# Patient Record
Sex: MALE | Race: WHITE | NOT HISPANIC OR LATINO | Employment: OTHER | URBAN - METROPOLITAN AREA
[De-identification: names, ages, dates, MRNs, and addresses within clinical notes are randomized per-mention and may not be internally consistent; named-entity substitution may affect disease eponyms.]

---

## 2017-01-23 ENCOUNTER — OFFICE VISIT (OUTPATIENT)
Dept: FAMILY MEDICINE | Facility: CLINIC | Age: 72
End: 2017-01-23
Payer: MEDICARE

## 2017-01-23 VITALS
DIASTOLIC BLOOD PRESSURE: 73 MMHG | HEIGHT: 67 IN | SYSTOLIC BLOOD PRESSURE: 137 MMHG | WEIGHT: 211 LBS | BODY MASS INDEX: 33.12 KG/M2 | HEART RATE: 78 BPM | TEMPERATURE: 97.7 F

## 2017-01-23 DIAGNOSIS — M25.512 LEFT SHOULDER PAIN, UNSPECIFIED CHRONICITY: Primary | ICD-10-CM

## 2017-01-23 PROCEDURE — 99213 OFFICE O/P EST LOW 20 MIN: CPT | Performed by: FAMILY MEDICINE

## 2017-01-23 RX ORDER — ALPRAZOLAM 0.5 MG
TABLET ORAL
Qty: 2 TABLET | Refills: 0 | Status: SHIPPED | OUTPATIENT
Start: 2017-01-23 | End: 2017-10-03

## 2017-01-23 NOTE — MR AVS SNAPSHOT
After Visit Summary   1/23/2017    Rian Ness    MRN: [id number]           Patient Information     Date Of Birth          1945        Visit Information        Provider Department      1/23/2017 9:20 AM Junie Golden MD [facility]        Today's Diagnoses     Left shoulder pain, unspecified chronicity    -  1       Care Instructions        Please  call [phone] to schedule your MRI.          Follow-ups after your visit        Future tests that were ordered for you today     Open Future Orders        Priority Expected Expires Ordered    MR Shoulder Left w/o Contrast Routine  1/23/2018 1/23/2017            Who to contact     If you have questions or need follow up information about today's clinic visit or your schedule please contact [facility] directly at [phone].  Normal or non-critical lab and imaging results will be communicated to you by MyChart, letter or phone within 4 business days after the clinic has received the results. If you do not hear from us within 7 days, please contact the clinic through MyChart or phone. If you have a critical or abnormal lab result, we will notify you by phone as soon as possible.  Submit refill requests through [employer] or call your pharmacy and they will forward the refill request to us. Please allow 3 business days for your refill to be completed.          Additional Information About Your Visit        MyChart Information     [employer] gives you secure access to your electronic health record. If you see a primary care provider, you can also send messages to your care team and make appointments. If you have questions, please call your primary care clinic.  If you do not have a primary care provider, please call [phone] and they will assist you.        Care EveryWhere ID     This is your Care EveryWhere ID. This could be used by other organizations to access your [place] medical records  [id number]       "  Your Vitals Were     Pulse Temperature Height BMI (Body Mass Index)          78 97.7  F (36.5  C) (Oral) 5' 7\" (1.702 m) 33.04 kg/m2         Blood Pressure from Last 3 Encounters:   01/23/17 137/73   11/16/16 110/70   11/14/16 135/87    Weight from Last 3 Encounters:   01/23/17 211 lb (95.709 kg)   11/14/16 211 lb (95.709 kg)   08/17/16 207 lb (93.895 kg)                 Today's Medication Changes          These changes are accurate as of: 1/23/17  9:43 AM.  If you have any questions, ask your nurse or doctor.               Start taking these medicines.        Dose/Directions    ALPRAZolam 0.5 MG tablet   Commonly known as:  XANAX   Used for:  Left shoulder pain, unspecified chronicity   Started by:  Junie Golden MD        Take 1 tab 30 min prior procedure.  May repeat dose if still with symptoms prior to test   Quantity:  2 tablet   Refills:  0            Where to get your medicines      Some of these will need a paper prescription and others can be bought over the counter.  Ask your nurse if you have questions.     Bring a paper prescription for each of these medications    - ALPRAZolam 0.5 MG tablet             Primary Care Provider Office Phone # Fax #    Junie Golden [phone]       [facility] [address] [facility] [address]        Thank you!     Thank you for choosing [facility]  for your care. Our goal is always to provide you with excellent care. Hearing back from our patients is one way we can continue to improve our services. Please take a few minutes to complete the written survey that you may receive in the mail after your visit with us. Thank you!             Your Updated Medication List - Protect others around you: Learn how to safely use, store and throw away your medicines at www.disposemymeds.org.          This list is accurate as of: 1/23/17  9:43 AM.  Always use your most recent med list.                   Brand Name Dispense " Instructions for use    ALEVE 220 MG capsule   Generic drug:  naproxen sodium      Take 220 mg by mouth 2 times daily (with meals).       ALPRAZolam 0.5 MG tablet    XANAX    2 tablet    Take 1 tab 30 min prior procedure.  May repeat dose if still with symptoms prior to test       DAILY MULTIVITAMIN PO      1 tab daily.       loratadine 10 MG tablet    CLARITIN    90 tablet    Take 1 tablet (10 mg) by mouth daily       MAGNESIUM PO      1 tablet daily       metoprolol 50 MG 24 hr tablet    TOPROL-XL    90 tablet    Take 1 tablet (50 mg) by mouth daily       omeprazole 20 MG CR capsule    priLOSEC    180 capsule    Take 1 capsule (20 mg) by mouth 2 times daily

## 2017-01-23 NOTE — NURSING NOTE
"Chief Complaint   Patient presents with     Shoulder Pain       Initial /73 mmHg  Pulse 78  Temp(Src) 97.7  F (36.5  C) (Oral)  Ht 5' 7\" (1.702 m)  Wt 211 lb (95.709 kg)  BMI 33.04 kg/m2 Estimated body mass index is 33.04 kg/(m^2) as calculated from the following:    Height as of this encounter: 5' 7\" (1.702 m).    Weight as of this encounter: 211 lb (95.709 kg).  BP completed using cuff size: chioma Crump CMA      "

## 2017-01-23 NOTE — PROGRESS NOTES
SUBJECTIVE:                                                    Rian Ness is a 71 year old male who presents to clinic today for the following health issues:      Follow up left shoulder pain , pt has been doing PT they recommended he come back may need more imaging   Has not improved as expected.  Suspect Rotator Cuff injury.      Patient Active Problem List   Diagnosis     Testicular hypofunction     Osteoarthritis, knee     Malignant neoplasm of prostate (H)     CARDIOVASCULAR SCREENING; LDL GOAL LESS THAN 130     Advanced directives, counseling/discussion     Status post total knee replacement     Rotator cuff tendinitis     AC (acromioclavicular) joint arthritis     Biceps tendonitis     Pseudophakia,ou     Hypertension goal BP (blood pressure) < 140/90     Gastroesophageal reflux disease without esophagitis     Acute pain of left shoulder     Posterior vitreous detachment, bilateral     Past Surgical History   Procedure Laterality Date     Arthroscopy knee rt/lt  2000     right     Arthroscopy knee rt/lt  1998     left     Phacoemulsification with standard intraocular lens implant  1/2010; 2/2010     left eye; right eye     C total knee arthroplasty  10/11     right knee     Suprapubic prostatectomy  2/11     Colonoscopy       Vascular surgery         Social History   Substance Use Topics     Smoking status: Former Smoker -- 1.50 packs/day for 34 years     Types: Cigarettes     Quit date: 02/01/1984     Smokeless tobacco: Never Used      Comment: Nonsmoking household     Alcohol Use: Yes      Comment: occ     Family History   Problem Relation Age of Onset     CANCER Mother      leukemia     Depression Other      Depression Other      Prostate Cancer Brother      Prostate Cancer Brother          Current Outpatient Prescriptions   Medication Sig Dispense Refill     ALPRAZolam (XANAX) 0.5 MG tablet Take 1 tab 30 min prior procedure.  May repeat dose if still with symptoms prior to test 2 tablet 0      "metoprolol (TOPROL-XL) 50 MG 24 hr tablet Take 1 tablet (50 mg) by mouth daily 90 tablet 1     omeprazole (PRILOSEC) 20 MG capsule Take 1 capsule (20 mg) by mouth 2 times daily 180 capsule 1     loratadine (CLARITIN) 10 MG tablet Take 1 tablet (10 mg) by mouth daily 90 tablet 1     naproxen sodium (ALEVE) 220 MG capsule Take 220 mg by mouth 2 times daily (with meals).       DAILY MULTIVITAMIN PO 1 tab daily.        MAGNESIUM OR 1 tablet daily       BP Readings from Last 3 Encounters:   01/23/17 137/73   11/16/16 110/70   11/14/16 135/87    Wt Readings from Last 3 Encounters:   01/23/17 211 lb (95.709 kg)   11/14/16 211 lb (95.709 kg)   08/17/16 207 lb (93.895 kg)                  Labs reviewed in EPIC  Problem list, Medication list, Allergies, and Medical/Social/Surgical histories reviewed in [facility] and updated as appropriate.    ROS:  Constitutional, HEENT, cardiovascular, pulmonary, gi and gu systems are negative, except as otherwise noted.    OBJECTIVE:                                                    /73 mmHg  Pulse 78  Temp(Src) 97.7  F (36.5  C) (Oral)  Ht 5' 7\" (1.702 m)  Wt 211 lb (95.709 kg)  BMI 33.04 kg/m2  Body mass index is 33.04 kg/(m^2).  GENERAL: healthy, alert and no distress  MS: no gross musculoskeletal defects noted, no edema  Left shoulder: limited ROM all planes, remaining exam limited due to pain and lack of ROM.  SKIN: no suspicious lesions or rashes  PSYCH: mentation appears normal, affect normal/bright    Diagnostic Test Results:  none      ASSESSMENT/PLAN:                                                    (M25.512) Left shoulder pain, unspecified chronicity  (primary encounter diagnosis)  Comment: not improving  Plan: MR Shoulder Left w/o Contrast, ALPRAZolam         (XANAX) 0.5 MG tablet        MRI of shoulder ordered, xanax for claustrophobia       See Patient Instructions    Junie Golden MD  [facility]    "

## 2017-02-08 NOTE — PROGRESS NOTES
SUBJECTIVE:                                                    Rian Ness is a 71 year old male who presents to clinic today for the following health issues:      Edema bilateral ankles , red itching , possible cellulitis   Has had symptoms for many years.  Had vein stripping years ago.   Now with bilateral lower extremity edema, some redness, tightness/mild itch.    Using compression stockings daily.   No calf pain, but does report aching at night that can awaken him          Patient Active Problem List   Diagnosis     Testicular hypofunction     Osteoarthritis, knee     Malignant neoplasm of prostate (H)     CARDIOVASCULAR SCREENING; LDL GOAL LESS THAN 130     Advanced directives, counseling/discussion     Status post total knee replacement     Rotator cuff tendinitis     AC (acromioclavicular) joint arthritis     Biceps tendonitis     Pseudophakia,ou     Hypertension goal BP (blood pressure) < 140/90     Gastroesophageal reflux disease without esophagitis     Acute pain of left shoulder     Posterior vitreous detachment, bilateral     Past Surgical History   Procedure Laterality Date     Arthroscopy knee rt/lt  2000     right     Arthroscopy knee rt/lt  1998     left     Phacoemulsification with standard intraocular lens implant  1/2010; 2/2010     left eye; right eye     C total knee arthroplasty  10/11     right knee     Suprapubic prostatectomy  2/11     Colonoscopy       Vascular surgery       Abdomen surgery         Social History   Substance Use Topics     Smoking status: Former Smoker     Packs/day: 1.50     Years: 34.00     Types: Cigarettes     Quit date: 2/1/1984     Smokeless tobacco: Never Used      Comment: Nonsmoking household     Alcohol use Yes      Comment: occ     Family History   Problem Relation Age of Onset     CANCER Mother      leukemia     Depression Other      Depression Other      Prostate Cancer Brother      Prostate Cancer Brother          Current Outpatient Prescriptions  "  Medication Sig Dispense Refill     metoprolol (TOPROL-XL) 50 MG 24 hr tablet Take 1 tablet (50 mg) by mouth daily 90 tablet 1     omeprazole (PRILOSEC) 20 MG capsule Take 1 capsule (20 mg) by mouth 2 times daily 180 capsule 1     loratadine (CLARITIN) 10 MG tablet Take 1 tablet (10 mg) by mouth daily 90 tablet 1     naproxen sodium (ALEVE) 220 MG capsule Take 220 mg by mouth 2 times daily (with meals).       DAILY MULTIVITAMIN PO 1 tab daily.        MAGNESIUM OR 1 tablet daily       ALPRAZolam (XANAX) 0.5 MG tablet Take 1 tab 30 min prior procedure.  May repeat dose if still with symptoms prior to test (Patient not taking: Reported on 2/13/2017) 2 tablet 0     BP Readings from Last 3 Encounters:   02/13/17 135/72   01/23/17 137/73   11/16/16 110/70    Wt Readings from Last 3 Encounters:   02/13/17 208 lb (94.3 kg)   01/23/17 211 lb (95.7 kg)   11/14/16 211 lb (95.7 kg)                  Labs reviewed in EPIC  Problem list, Medication list, Allergies, and Medical/Social/Surgical histories reviewed in [facility] and updated as appropriate.    ROS:  Constitutional, HEENT, cardiovascular, pulmonary, gi and gu systems are negative, except as otherwise noted.    OBJECTIVE:                                                    /72 (Cuff Size: Adult Large)  Pulse 80  Temp 97.3  F (36.3  C) (Oral)  Ht 5' 7\" (1.702 m)  Wt 208 lb (94.3 kg)  BMI 32.58 kg/m2  Body mass index is 32.58 kg/(m^2).  GENERAL: healthy, alert and no distress  EYES: Eyes grossly normal to inspection, PERRL and conjunctivae and sclerae normal  MS: non-pitting edema to mid shin bilaterally, severe varicose veins on bilateral feet and moderate in bilateral lower legs.    SKIN: venous stasis changes, erythema of lower legs, brown staining as well  PSYCH: mentation appears normal, affect normal/bright    Diagnostic Test Results:  none      ASSESSMENT/PLAN:                                                    (I83.93) Varicose veins of legs  (primary encounter " diagnosis)  (I83.11,  I83.12) Venous stasis dermatitis of both lower extremities  Comment: changes likely due to venous disease  Plan: VASCULAR SURGERY REFERRAL        Refer to vascular surgery  Continue use of compression stockings    (I10) Hypertension goal BP (blood pressure) < 140/90  Comment: due for labs  Plan: Basic metabolic panel  (Ca, Cl, CO2, Creat,         Gluc, K, Na, BUN)              See Patient Instructions    Junie Golden MD  [facility]

## 2017-02-13 ENCOUNTER — OFFICE VISIT (OUTPATIENT)
Dept: FAMILY MEDICINE | Facility: CLINIC | Age: 72
End: 2017-02-13
Payer: MEDICARE

## 2017-02-13 ENCOUNTER — TELEPHONE (OUTPATIENT)
Dept: OTHER | Facility: CLINIC | Age: 72
End: 2017-02-13

## 2017-02-13 VITALS
WEIGHT: 208 LBS | TEMPERATURE: 97.3 F | HEIGHT: 67 IN | DIASTOLIC BLOOD PRESSURE: 72 MMHG | BODY MASS INDEX: 32.65 KG/M2 | HEART RATE: 80 BPM | SYSTOLIC BLOOD PRESSURE: 135 MMHG

## 2017-02-13 DIAGNOSIS — I83.93 VARICOSE VEINS OF LEGS: Primary | ICD-10-CM

## 2017-02-13 DIAGNOSIS — I87.2 VENOUS STASIS DERMATITIS OF BOTH LOWER EXTREMITIES: ICD-10-CM

## 2017-02-13 DIAGNOSIS — I10 HYPERTENSION GOAL BP (BLOOD PRESSURE) < 140/90: ICD-10-CM

## 2017-02-13 LAB
ANION GAP SERPL CALCULATED.3IONS-SCNC: 3 MMOL/L (ref 3–14)
BUN SERPL-MCNC: 17 MG/DL (ref 7–30)
CALCIUM SERPL-MCNC: 8.9 MG/DL (ref 8.5–10.1)
CHLORIDE SERPL-SCNC: 105 MMOL/L (ref 94–109)
CO2 SERPL-SCNC: 33 MMOL/L (ref 20–32)
CREAT SERPL-MCNC: 0.91 MG/DL (ref 0.66–1.25)
GFR SERPL CREATININE-BSD FRML MDRD: 82 ML/MIN/1.7M2
GLUCOSE SERPL-MCNC: 83 MG/DL (ref 70–99)
POTASSIUM SERPL-SCNC: 4.6 MMOL/L (ref 3.4–5.3)
SODIUM SERPL-SCNC: 141 MMOL/L (ref 133–144)

## 2017-02-13 PROCEDURE — 99213 OFFICE O/P EST LOW 20 MIN: CPT | Performed by: FAMILY MEDICINE

## 2017-02-13 PROCEDURE — 80048 BASIC METABOLIC PNL TOTAL CA: CPT | Performed by: FAMILY MEDICINE

## 2017-02-13 PROCEDURE — 36415 COLL VENOUS BLD VENIPUNCTURE: CPT | Performed by: FAMILY MEDICINE

## 2017-02-13 NOTE — TELEPHONE ENCOUNTER
Spoke with patient and he does not want to drive down to [place]. Thought appointment was in [place] which is closer to home. Will ask primary care DrAyde If there is someone closer.     Kina Contreras MA

## 2017-02-13 NOTE — TELEPHONE ENCOUNTER
Pt referred to [facility] by :Junie Golden MD for venous stasis, bilateral lower extremity edema, possible cellulitis.    Pt needs to be scheduled for consult with vascular medicine.  Will route to scheduling to coordinate an appointment next available.    Sabrina Louis RN BSN

## 2017-02-13 NOTE — PATIENT INSTRUCTIONS
Call to make appointment with vascular surgery      Wear compression stockings daily      Will consider water pill if needed.

## 2017-02-13 NOTE — NURSING NOTE
"Chief Complaint   Patient presents with     Edema       Initial /72 (Cuff Size: Adult Large)  Pulse 80  Temp 97.3  F (36.3  C) (Oral)  Ht 5' 7\" (1.702 m)  Wt 208 lb (94.3 kg)  BMI 32.58 kg/m2 Estimated body mass index is 32.58 kg/(m^2) as calculated from the following:    Height as of this encounter: 5' 7\" (1.702 m).    Weight as of this encounter: 208 lb (94.3 kg).  Medication Reconciliation: complete  Mandi Crump , SHERI     "

## 2017-02-13 NOTE — MR AVS SNAPSHOT
After Visit Summary   2/13/2017    Rian Ness    MRN: [id number]           Patient Information     Date Of Birth          1945        Visit Information        Provider Department      2/13/2017 8:20 AM Junie Golden MD [facility]        Today's Diagnoses     Varicose veins of legs    -  1    Venous stasis dermatitis of both lower extremities        Hypertension goal BP (blood pressure) < 140/90          Care Instructions    Call to make appointment with vascular surgery      Wear compression stockings daily      Will consider water pill if needed.        Follow-ups after your visit        Additional Services     VASCULAR SURGERY REFERRAL       Your provider has referred you to: [facility]: [facility] - Vascular  Services [phone] - Varicose Veins & None - Please Order Appropriate Testing   [email or web address]    Please be aware that coverage of these services is subject to the terms and limitations of your health insurance plan.  Call member services at your health plan with any benefit or coverage questions.      Please bring the following with you to your appointment:    (1) Any X-Rays, CTs or MRIs which have been performed.  Contact the facility where they were done to arrange for  prior to your scheduled appointment.    (2) List of current medications   (3) This referral request   (4) Any documents/labs given to you for this referral                  Who to contact     If you have questions or need follow up information about today's clinic visit or your schedule please contact [facility] directly at [phone].  Normal or non-critical lab and imaging results will be communicated to you by MyChart, letter or phone within 4 business days after the clinic has received the results. If you do not hear from us within 7 days, please contact the clinic through MyChart or phone. If you  "have a critical or abnormal lab result, we will notify you by phone as soon as possible.  Submit refill requests through [employer] or call your pharmacy and they will forward the refill request to us. Please allow 3 business days for your refill to be completed.          Additional Information About Your Visit        [employer] Information     [employer] gives you secure access to your electronic health record. If you see a primary care provider, you can also send messages to your care team and make appointments. If you have questions, please call your primary care clinic.  If you do not have a primary care provider, please call [phone] and they will assist you.        Care EveryWhere ID     This is your Care EveryWhere ID. This could be used by other organizations to access your [place] medical records  [id number]        Your Vitals Were     Pulse Temperature Height BMI (Body Mass Index)          80 97.3  F (36.3  C) (Oral) 5' 7\" (1.702 m) 32.58 kg/m2         Blood Pressure from Last 3 Encounters:   02/13/17 135/72   01/23/17 137/73   11/16/16 110/70    Weight from Last 3 Encounters:   02/13/17 208 lb (94.3 kg)   01/23/17 211 lb (95.7 kg)   11/14/16 211 lb (95.7 kg)              We Performed the Following     Basic metabolic panel  (Ca, Cl, CO2, Creat, Gluc, K, Na, BUN)     VASCULAR SURGERY REFERRAL        Primary Care Provider Office Phone # Fax #    Junie La Golden [phone]       [facility] [address] [facility] [address]        Thank you!     Thank you for choosing [facility]  for your care. Our goal is always to provide you with excellent care. Hearing back from our patients is one way we can continue to improve our services. Please take a few minutes to complete the written survey that you may receive in the mail after your visit with us. Thank you!             Your Updated Medication List - Protect others around you: Learn how to safely use, store and " throw away your medicines at www.disposemymeds.org.          This list is accurate as of: 2/13/17  9:05 AM.  Always use your most recent med list.                   Brand Name Dispense Instructions for use    ALEVE 220 MG capsule   Generic drug:  naproxen sodium      Take 220 mg by mouth 2 times daily (with meals).       ALPRAZolam 0.5 MG tablet    XANAX    2 tablet    Take 1 tab 30 min prior procedure.  May repeat dose if still with symptoms prior to test       DAILY MULTIVITAMIN PO      1 tab daily.       loratadine 10 MG tablet    CLARITIN    90 tablet    Take 1 tablet (10 mg) by mouth daily       MAGNESIUM PO      1 tablet daily       metoprolol 50 MG 24 hr tablet    TOPROL-XL    90 tablet    Take 1 tablet (50 mg) by mouth daily       omeprazole 20 MG CR capsule    priLOSEC    180 capsule    Take 1 capsule (20 mg) by mouth 2 times daily

## 2017-02-14 DIAGNOSIS — I10 ESSENTIAL HYPERTENSION WITH GOAL BLOOD PRESSURE LESS THAN 140/90: ICD-10-CM

## 2017-02-14 DIAGNOSIS — R10.13 EPIGASTRIC PAIN: ICD-10-CM

## 2017-02-15 RX ORDER — METOPROLOL SUCCINATE 50 MG/1
50 TABLET, EXTENDED RELEASE ORAL DAILY
Qty: 90 TABLET | Refills: 3 | Status: SHIPPED | OUTPATIENT
Start: 2017-02-15 | End: 2018-02-26

## 2017-03-10 ENCOUNTER — RADIANT APPOINTMENT (OUTPATIENT)
Dept: MRI IMAGING | Facility: CLINIC | Age: 72
End: 2017-03-10
Attending: FAMILY MEDICINE
Payer: MEDICARE

## 2017-03-10 PROCEDURE — 73221 MRI JOINT UPR EXTREM W/O DYE: CPT | Mod: TC

## 2017-03-20 ENCOUNTER — MYC MEDICAL ADVICE (OUTPATIENT)
Dept: FAMILY MEDICINE | Facility: CLINIC | Age: 72
End: 2017-03-20

## 2017-03-20 DIAGNOSIS — M12.812 LEFT ROTATOR CUFF TEAR ARTHROPATHY: Primary | ICD-10-CM

## 2017-03-20 DIAGNOSIS — M75.102 LEFT ROTATOR CUFF TEAR ARTHROPATHY: Primary | ICD-10-CM

## 2017-03-21 NOTE — TELEPHONE ENCOUNTER
Called and spoke to [facility] imaging schedulers, they called over to the MRI room in [place] and they apologized it looks like the reading radiologist never signed off on it which is why nothing is resulted in epic. They said if there is nothing in there by the end of the day we should give them a call back for them to talk to the providers to figure out what's going on.    Nayana Kearney,

## 2017-03-21 NOTE — TELEPHONE ENCOUNTER
The preliminary results for the MR shoulder done on 3/10/17 are now in chart.    Nayana Kearney,

## 2017-03-21 NOTE — TELEPHONE ENCOUNTER
TC, patient has MRI done 3/10/17 but there is no result in Epic.  Can you please have them finalize the result or have them fax to PCP?    Thanks,   Martha Prince RN

## 2017-03-27 ENCOUNTER — OFFICE VISIT (OUTPATIENT)
Dept: ORTHOPEDICS | Facility: CLINIC | Age: 72
End: 2017-03-27
Payer: MEDICARE

## 2017-03-27 VITALS — HEIGHT: 67 IN | WEIGHT: 203 LBS | RESPIRATION RATE: 18 BRPM | BODY MASS INDEX: 31.86 KG/M2

## 2017-03-27 DIAGNOSIS — M19.019 AC (ACROMIOCLAVICULAR) JOINT ARTHRITIS: ICD-10-CM

## 2017-03-27 DIAGNOSIS — Z98.890 S/P COMPLETE REPAIR OF ROTATOR CUFF: Primary | ICD-10-CM

## 2017-03-27 PROCEDURE — 99204 OFFICE O/P NEW MOD 45 MIN: CPT | Performed by: ORTHOPAEDIC SURGERY

## 2017-03-27 NOTE — NURSING NOTE
"Chief Complaint   Patient presents with     Consult     Left shoulder injury on 11/23/16 fell off trailer in his driveway. Pain level 0-5-6/10 sharp and episodic. Rest makes the pain better and work and exercise makes the pain worse.       Initial Resp 18  Ht 1.702 m (5' 7\")  Wt 92.1 kg (203 lb)  BMI 31.79 kg/m2 Estimated body mass index is 31.79 kg/(m^2) as calculated from the following:    Height as of this encounter: 1.702 m (5' 7\").    Weight as of this encounter: 92.1 kg (203 lb).  Medication Reconciliation: complete   Kamryn Shaw MA      "

## 2017-03-27 NOTE — MR AVS SNAPSHOT
After Visit Summary   3/27/2017    Rian Ness    MRN: [id number]           Patient Information     Date Of Birth          1945        Visit Information        Provider Department      3/27/2017 9:00 AM Flash Post MD [facility]        Care Instructions    Large rotator cuff tear has a 6 month window to fix it.  Most of our window is gone.  Probably about a 60% chance of healing with surgery.  Another option is to leave it alone, baby it, and try to maintain what you have.  Third option is reverse total shoulder arthroplasty in the future.    Rotator cuff repair is done in same day surgery.    Preop physical with primary physician is needed within 30 days of the surgery.  Nothing to eat or drink for 8 hours before surgery.  For same day surgery you need a ride.  Someone should stay with you for 12 hours afterward.  Stop blood thinners 5 days before surgery (aspirin, warfarin, anti-inflammatories).    Stop Plavix at least 10-14 days before surgery.  General anesthesia is used.  They often perform a pain block at the neck to help with pain.  Sutures are in the wound.  Keep wound dry until clinic appointment at 1 1/2 weeks.     Physical Therapy will start after first clinic visit.  0-3 weeks.  Arm in sling or immobilizer.  No reaching with operative arm.   Okay to have arm out to dangle or bend elbow.    3-6 weeks.  Discontinue immobilizer.  Start reaching.  No lifting over 1 lb.  6-12 weeks  Work on strengthening.  1 year to full recovery.    If you decide to have surgery, call us first at [phone].  Call [phone] to schedule your surgery.        Follow-ups after your visit        Who to contact     If you have questions or need follow up information about today's clinic visit or your schedule please contact [facility] directly at [phone].  Normal or non-critical lab and imaging results will be communicated to you by MyChart, letter or phone  "within 4 business days after the clinic has received the results. If you do not hear from us within 7 days, please contact the clinic through "[employer] or phone. If you have a critical or abnormal lab result, we will notify you by phone as soon as possible.  Submit refill requests through "[employer] or call your pharmacy and they will forward the refill request to us. Please allow 3 business days for your refill to be completed.          Additional Information About Your Visit        [employer] Information     "[employer] gives you secure access to your electronic health record. If you see a primary care provider, you can also send messages to your care team and make appointments. If you have questions, please call your primary care clinic.  If you do not have a primary care provider, please call [phone] and they will assist you.        Care EveryWhere ID     This is your Care EveryWhere ID. This could be used by other organizations to access your [facility] medical records  [id number]        Your Vitals Were     Respirations Height BMI (Body Mass Index)             18 1.702 m (5' 7\") 31.79 kg/m2          Blood Pressure from Last 3 Encounters:   02/13/17 135/72   01/23/17 137/73   11/16/16 110/70    Weight from Last 3 Encounters:   03/27/17 92.1 kg (203 lb)   02/13/17 94.3 kg (208 lb)   01/23/17 95.7 kg (211 lb)              Today, you had the following     No orders found for display       Primary Care Provider Office Phone # Fax #    Junie Golden [phone]       [facility] [address] [facility] [address]        Thank you!     Thank you for choosing [facility]  for your care. Our goal is always to provide you with excellent care. Hearing back from our patients is one way we can continue to improve our services. Please take a few minutes to complete the written survey that you may receive in the mail after your visit with us. Thank you!             Your Updated " Medication List - Protect others around you: Learn how to safely use, store and throw away your medicines at www.disposemymeds.org.          This list is accurate as of: 3/27/17  9:38 AM.  Always use your most recent med list.                   Brand Name Dispense Instructions for use    ALEVE 220 MG capsule   Generic drug:  naproxen sodium      Take 220 mg by mouth 2 times daily (with meals).       ALPRAZolam 0.5 MG tablet    XANAX    2 tablet    Take 1 tab 30 min prior procedure.  May repeat dose if still with symptoms prior to test       DAILY MULTIVITAMIN PO      1 tab daily.       loratadine 10 MG tablet    CLARITIN    90 tablet    Take 1 tablet (10 mg) by mouth daily       MAGNESIUM PO      1 tablet daily       metoprolol 50 MG 24 hr tablet    TOPROL-XL    90 tablet    Take 1 tablet (50 mg) by mouth daily       omeprazole 20 MG CR capsule    priLOSEC    180 capsule    Take 1 capsule (20 mg) by mouth 2 times daily

## 2017-03-27 NOTE — PATIENT INSTRUCTIONS
Large rotator cuff tear has a 6 month window to fix it.  Most of our window is gone.  Probably about a 60% chance of healing with surgery.  Another option is to leave it alone, baby it, and try to maintain what you have.  Third option is reverse total shoulder arthroplasty in the future.    Rotator cuff repair is done in same day surgery.    Preop physical with primary physician is needed within 30 days of the surgery.  Nothing to eat or drink for 8 hours before surgery.  For same day surgery you need a ride.  Someone should stay with you for 12 hours afterward.  Stop blood thinners 5 days before surgery (aspirin, warfarin, anti-inflammatories).    Stop Plavix at least 10-14 days before surgery.  General anesthesia is used.  They often perform a pain block at the neck to help with pain.  Sutures are in the wound.  Keep wound dry until clinic appointment at 1 1/2 weeks.     Physical Therapy will start after first clinic visit.  0-3 weeks.  Arm in sling or immobilizer.  No reaching with operative arm.   Okay to have arm out to dangle or bend elbow.    3-6 weeks.  Discontinue immobilizer.  Start reaching.  No lifting over 1 lb.  6-12 weeks  Work on strengthening.  1 year to full recovery.    If you decide to have surgery, call us first at [phone].  Call [phone] to schedule your surgery.

## 2017-03-27 NOTE — LETTER
3/27/2017       RE: Rian Ness  [address] [facility] [address]           Dear Colleague,    Thank you for referring your patient, Rian Ness, to the [facility]. Please see a copy of my visit note below.    Rian Ness is a 71 year old male who is seen in consultation at the request of Dr. Junie Golden  for left shoulder injury in November 23,2016    Past Medical History:   Diagnosis Date     Arthritis      Cancer (H)      Depressive disorder      HTN        Past Surgical History:   Procedure Laterality Date     ABDOMEN SURGERY       ARTHROSCOPY KNEE RT/LT  2000    right     ARTHROSCOPY KNEE RT/LT  1998    left     C TOTAL KNEE ARTHROPLASTY  10/11    right knee     COLONOSCOPY       PHACOEMULSIFICATION WITH STANDARD INTRAOCULAR LENS IMPLANT  1/2010; 2/2010    left eye; right eye     SUPRAPUBIC PROSTATECTOMY  2/11     VASCULAR SURGERY         Family History   Problem Relation Age of Onset     CANCER Mother      leukemia     Depression Other      Depression Other      Prostate Cancer Brother      Prostate Cancer Brother        Social History     Social History     Marital status:      Spouse name: N/A     Number of children: N/A     Years of education: N/A     Occupational History     Not on file.     Social History Main Topics     Smoking status: Former Smoker     Packs/day: 1.50     Years: 34.00     Types: Cigarettes     Quit date: 2/1/1984     Smokeless tobacco: Never Used      Comment: Nonsmoking household     Alcohol use Yes      Comment: occ     Drug use: No     Sexual activity: No     Other Topics Concern     Parent/Sibling W/ Cabg, Mi Or Angioplasty Before 65f 55m? No     Social History Narrative       Current Outpatient Prescriptions   Medication Sig Dispense Refill     metoprolol (TOPROL-XL) 50 MG 24 hr tablet Take 1 tablet (50 mg) by mouth daily 90 tablet 3     omeprazole (PRILOSEC) 20 MG CR capsule Take 1 capsule (20 mg) by mouth 2 times daily 180  "capsule 3     ALPRAZolam (XANAX) 0.5 MG tablet Take 1 tab 30 min prior procedure.  May repeat dose if still with symptoms prior to test (Patient not taking: Reported on 2/13/2017) 2 tablet 0     loratadine (CLARITIN) 10 MG tablet Take 1 tablet (10 mg) by mouth daily 90 tablet 1     naproxen sodium (ALEVE) 220 MG capsule Take 220 mg by mouth 2 times daily (with meals).       DAILY MULTIVITAMIN PO 1 tab daily.        MAGNESIUM OR 1 tablet daily         No Known Allergies    REVIEW OF SYSTEMS:  CONSTITUTIONAL:  NEGATIVE for fever, chills, change in weight, not feeling tired  SKIN:  NEGATIVE for worrisome rashes, no skin lumps, no skin ulcers and no non-healing wounds  EYES:  NEGATIVE for vision changes or irritation.  ENT/MOUTH:  NEGATIVE.  No hearing loss, no hoarseness, no difficulty swallowing.  RESP:  NEGATIVE. No cough or shortness of breath.  BREAST:  NEGATIVE for masses, tenderness or discharge  CV:  NEGATIVE for chest pain, palpitations or peripheral edema  GI:  NEGATIVE for nausea, abdominal pain, heartburn, or change in bowel habits  :  Negative. No dysuria, no hematuria  MUSCULOSKELETAL:  See HPI above  NEURO:  NEGATIVE . No headaches, no dizziness,  no numbness  ENDOCRINE:  NEGATIVE for temperature intolerance, skin/hair changes  HEME/ALLERGY/IMMUNE:  NEGATIVE for bleeding problems  PSYCHIATRIC:  NEGATIVE. no anxiety, no depression.     Exam:  Vitals: Resp 18  Ht 1.702 m (5' 7\")  Wt 92.1 kg (203 lb)  BMI 31.79 kg/m2  BMI= Body mass index is 31.79 kg/(m^2).  Constitutional:  healthy, alert and no distress  Neuro: Alert and Oriented x 3, Gait normal. Sensation grossly WNL.  Psych: Affect normal   Respiratory: Breathing not labored.  Cardiovascular: normal peripheral pulses  Lymph: no adenopathy  Skin: No rashes,worrisome lesions or skin problems      Again, thank you for allowing me to participate in the care of your patient.        Sincerely,              Flash Post MD    "

## 2017-03-29 PROBLEM — Z98.890 S/P COMPLETE REPAIR OF ROTATOR CUFF: Status: ACTIVE | Noted: 2017-03-29

## 2017-03-30 NOTE — PROGRESS NOTES
DATE OF VISIT:  03/27/2017.      HISTORY OF PRESENT ILLNESS:  Mr. Rian Ness is a 71-year-old male referred from Dr. Junie Golden for evaluation of left shoulder pain.  He has had some prior problems with the shoulder but was quite tolerable.  Then on 11/23/2016, he fell off a trailer in his driveway and has had increased pain and weakness in the left shoulder since then.  He has difficulty raising the arm with anything in the hand.  He has no pain at rest but has sharp episodic pain rated 5/10 to 6/10 with use.  He has tried physical therapy and has not made progress.  Work and exercise seem to make it worse, rest seems to help.  He has had x-ray and MRI.  The x-ray of the shoulder showed mild degenerative changes of the AC joint.  There was no significant narrowing of the coracoacromial arch and no arthritis in the shoulder glenohumeral joint.  MRI scan was performed showing a full thickness tear of the rotator cuff measuring approximately 2 x 2 cm with retraction back to the bony glenoid margin.  There was minimal fatty atrophy of the supraspinatus muscle.  Infraspinatus also showed some degenerative tendinopathy.  There was no definite tear of this.  There was marked thinning of the subscapularis tendon consistent with a near full thickness tear.  The biceps tendon was markedly thin and irregular with some fragmentation consistent with at least a partial tear.      PHYSICAL EXAMINATION:  Shows full motion of his neck without pain.  He has full mobility of the shoulder, but has definite weakness of resisted external rotation and abduction on the left compared to the right.  He has pain with resisted external rotation and abduction.  Presently is able to raise the arm on his own, but not with any significant weight to the left arm.  He has no increased warmth or erythema of the shoulder.  No skin lesions.  Sensation and circulation are intact.      IMPRESSION:  Moderately large left rotator cuff tear and  acromioclavicular arthrosis along with biceps tendinosis and partial tearing.  This is a little over 4 months old at this time.  I have explained to him that with a large rotator cuff tear we have an approximately 6-month window for repair of this and that the success rate goes down each month that is delayed.  Thus, we could attempt to fix the rotator cuff at this time.  It appears there is not significant atrophy of the muscle, but he would have a long recovery.  The overall success rate on fixing this is probably close to 70%.  If he decides not to fix this, we would treat conservatively to control pain, possibly anti-inflammatories, steroid injection or just taking it very easy on the shoulder and if things got worse in the future, we could consider reverse total shoulder arthroplasty.  He is not sure about how he wishes to approach this and is going to discuss it further at home.  I have given him instructions for surgery in case he wishes to proceed.         DOROTHY ESPINO MD             D: 2017 20:15   T: 2017 21:36   MT: JAJA      Name:     SANDIP BENJAMIN   MRN:      [id number]        Account:      [id number]   :      1945           Visit Date:   2017      Document: [id number]

## 2017-03-30 NOTE — PROGRESS NOTES
Rian Ness is a 71 year old male who is seen in consultation at the request of Dr. Junie Golden  for left shoulder injury in November 23,2016    Past Medical History:   Diagnosis Date     Arthritis      Cancer (H)      Depressive disorder      HTN        Past Surgical History:   Procedure Laterality Date     ABDOMEN SURGERY       ARTHROSCOPY KNEE RT/LT  2000    right     ARTHROSCOPY KNEE RT/LT  1998    left     C TOTAL KNEE ARTHROPLASTY  10/11    right knee     COLONOSCOPY       PHACOEMULSIFICATION WITH STANDARD INTRAOCULAR LENS IMPLANT  1/2010; 2/2010    left eye; right eye     SUPRAPUBIC PROSTATECTOMY  2/11     VASCULAR SURGERY         Family History   Problem Relation Age of Onset     CANCER Mother      leukemia     Depression Other      Depression Other      Prostate Cancer Brother      Prostate Cancer Brother        Social History     Social History     Marital status:      Spouse name: N/A     Number of children: N/A     Years of education: N/A     Occupational History     Not on file.     Social History Main Topics     Smoking status: Former Smoker     Packs/day: 1.50     Years: 34.00     Types: Cigarettes     Quit date: 2/1/1984     Smokeless tobacco: Never Used      Comment: Nonsmoking household     Alcohol use Yes      Comment: occ     Drug use: No     Sexual activity: No     Other Topics Concern     Parent/Sibling W/ Cabg, Mi Or Angioplasty Before 65f 55m? No     Social History Narrative       Current Outpatient Prescriptions   Medication Sig Dispense Refill     metoprolol (TOPROL-XL) 50 MG 24 hr tablet Take 1 tablet (50 mg) by mouth daily 90 tablet 3     omeprazole (PRILOSEC) 20 MG CR capsule Take 1 capsule (20 mg) by mouth 2 times daily 180 capsule 3     ALPRAZolam (XANAX) 0.5 MG tablet Take 1 tab 30 min prior procedure.  May repeat dose if still with symptoms prior to test (Patient not taking: Reported on 2/13/2017) 2 tablet 0     loratadine (CLARITIN) 10 MG tablet Take 1 tablet (10 mg)  "by mouth daily 90 tablet 1     naproxen sodium (ALEVE) 220 MG capsule Take 220 mg by mouth 2 times daily (with meals).       DAILY MULTIVITAMIN PO 1 tab daily.        MAGNESIUM OR 1 tablet daily         No Known Allergies    REVIEW OF SYSTEMS:  CONSTITUTIONAL:  NEGATIVE for fever, chills, change in weight, not feeling tired  SKIN:  NEGATIVE for worrisome rashes, no skin lumps, no skin ulcers and no non-healing wounds  EYES:  NEGATIVE for vision changes or irritation.  ENT/MOUTH:  NEGATIVE.  No hearing loss, no hoarseness, no difficulty swallowing.  RESP:  NEGATIVE. No cough or shortness of breath.  BREAST:  NEGATIVE for masses, tenderness or discharge  CV:  NEGATIVE for chest pain, palpitations or peripheral edema  GI:  NEGATIVE for nausea, abdominal pain, heartburn, or change in bowel habits  :  Negative. No dysuria, no hematuria  MUSCULOSKELETAL:  See HPI above  NEURO:  NEGATIVE . No headaches, no dizziness,  no numbness  ENDOCRINE:  NEGATIVE for temperature intolerance, skin/hair changes  HEME/ALLERGY/IMMUNE:  NEGATIVE for bleeding problems  PSYCHIATRIC:  NEGATIVE. no anxiety, no depression.     Exam:  Vitals: Resp 18  Ht 1.702 m (5' 7\")  Wt 92.1 kg (203 lb)  BMI 31.79 kg/m2  BMI= Body mass index is 31.79 kg/(m^2).  Constitutional:  healthy, alert and no distress  Neuro: Alert and Oriented x 3, Gait normal. Sensation grossly WNL.  Psych: Affect normal   Respiratory: Breathing not labored.  Cardiovascular: normal peripheral pulses  Lymph: no adenopathy  Skin: No rashes,worrisome lesions or skin problems    "

## 2017-10-02 NOTE — PROGRESS NOTES
SUBJECTIVE:   Rian Ness is a 71 year old male who presents for Preventive Visit.    Are you in the first 12 months of your Medicare Part B coverage?  No    [place] [facility] [facility] for HPI/ROS submitted by the patient on 9/30/2017     Annual Exam:  Getting at least 3 servings of Calcium per day:: Yes  Bi-annual eye exam:: Yes  Dental care twice a year:: Yes  Sleep apnea or symptoms of sleep apnea:: None  Diet:: Regular (no restrictions)  Frequency of exercise:: 1 day/week  Taking medications regularly:: Yes  Medication side effects:: None  Additional concerns today:: YES  PHQ-2 Score: 0  Duration of exercise:: 15-30 minutes    COGNITIVE SCREEN  1) Repeat 3 items (Banana, Sunrise, Chair)    2) Clock draw: NORMAL  3) 3 item recall: Recalls 2 objects   Results: NORMAL clock, 1-2 items recalled: COGNITIVE IMPAIRMENT LESS LIKELY    Mini-CogTM Copyright REYNALDO Ruiz. Licensed by the author for use in [facility]; reprinted with permission (phuong@.[facility]). All rights reserved.                  Reviewed and updated as needed this visit by clinical staff  Tobacco  Allergies  Meds  Problems  Med Hx  Surg Hx  Fam Hx  Soc Hx          Reviewed and updated as needed this visit by Provider  Allergies  Meds  Problems        Social History   Substance Use Topics     Smoking status: Former Smoker     Packs/day: 1.50     Years: 34.00     Types: Cigarettes     Quit date: 2/1/1984     Smokeless tobacco: Never Used      Comment: Nonsmoking household     Alcohol use Yes      Comment: occ       Social alcohol use    Today's PHQ-2 Score:   PHQ-2 ( 1999 Pfizer) 9/30/2017 8/14/2016   Q1: Little interest or pleasure in doing things 0 -   Q2: Feeling down, depressed or hopeless 0 -   PHQ-2 Score 0 -   Q1: Little interest or pleasure in doing things Not at all Not at all   Q2: Feeling down, depressed or hopeless Not at all Not at all   PHQ-2 Score 0 0         Do you feel safe in your environment - No    Do you  have a Health Care Directive?: No: Advance care planning was reviewed with patient; patient declined at this time.    Family history of prostate cancer: Yes brothers x 2  Last PSA:   PSA   Date Value Ref Range Status   11/09/2016 0.03 0 - 4 ug/L Final     Comment:     Assay Method:  Chemiluminescence using Siemens Vista analyzer     Doing self testicular exam: NO     Family history of colon cancer:No  Last colonscopy: 2009     Family history of CAD:No  Last Cholesterol:   Lab Results   Component Value Date    CHOL 174 11/26/2012     Lab Results   Component Value Date    HDL 35 11/26/2012     Lab Results   Component Value Date     11/26/2012     Lab Results   Component Value Date    TRIG 182 11/26/2012     Lab Results   Component Value Date    CHOLHDLRATIO 4.9 11/26/2012          Immunizations:    Date last DT tdap 2012,  Date last Pneumovax completed,   Date last Flu due       Seat Belt:YES   Sunscreen use: YES  Calcium Intake: adeq  Health Care Directive:NO   Sexually Active:YES      Current contraception: none     Current Concerns: none          Patient Ed:  Reviewed health maintenance including diet, regular exercise, and periodic exams.     The risks, benefits, and treatment options of prescribed medications or other treatments have been discussed with the patient.  The patient should call or schedule a follow up appt if no improvement or other problems.   Current providers sharing in care for this patient include:   Patient Care Team:  Junie Golden MD as PCP - General (Family Practice)      Hearing impairment: Yes, will plan to get eval for hearing aid    Ability to successfully perform activities of daily living: Yes, no assistance needed     Fall risk:  Fallen 2 or more times in the past year?: No  Any fall with injury in the past year?: No      Home safety:  none identified      The following health maintenance items are reviewed in Epic and correct as of today:Health Maintenance   Topic Date  Due     MEDICARE ANNUAL WELLNESS VISIT  08/17/2017     FALL RISK ASSESSMENT  08/17/2017     PSA Q1 YR  11/09/2017     LIPID SCREEN Q5 YR MALE (SYSTEM ASSIGNED)  11/26/2017     EYE EXAM Q1 YEAR  12/09/2017     BMP Q1 YR  02/13/2018     COLON CANCER SCREEN (SYSTEM ASSIGNED)  04/15/2019     ADVANCE DIRECTIVE PLANNING Q5 YRS  11/14/2021     TETANUS IMMUNIZATION (SYSTEM ASSIGNED)  12/03/2022     INFLUENZA VACCINE (SYSTEM ASSIGNED)  Completed     PNEUMOCOCCAL  Completed     AORTIC ANEURYSM SCREENING (SYSTEM ASSIGNED)  Completed     Labs reviewed in EPIC  BP Readings from Last 3 Encounters:   10/03/17 117/70   02/13/17 135/72   01/23/17 137/73    Wt Readings from Last 3 Encounters:   10/03/17 208 lb (94.3 kg)   03/27/17 203 lb (92.1 kg)   02/13/17 208 lb (94.3 kg)                  Patient Active Problem List   Diagnosis     Testicular hypofunction     Osteoarthritis, knee     Malignant neoplasm of prostate (H)     CARDIOVASCULAR SCREENING; LDL GOAL LESS THAN 130     Advanced directives, counseling/discussion     Status post total knee replacement     AC (acromioclavicular) joint arthritis     Biceps tendonitis     Pseudophakia,ou     Hypertension goal BP (blood pressure) < 140/90     Gastroesophageal reflux disease without esophagitis     Posterior vitreous detachment, bilateral     S/P complete repair of rotator cuff     Past Surgical History:   Procedure Laterality Date     ABDOMEN SURGERY       ARTHROSCOPY KNEE RT/LT  2000    right     ARTHROSCOPY KNEE RT/LT  1998    left     C TOTAL KNEE ARTHROPLASTY  10/11    right knee     COLONOSCOPY       PHACOEMULSIFICATION WITH STANDARD INTRAOCULAR LENS IMPLANT  1/2010; 2/2010    left eye; right eye     SUPRAPUBIC PROSTATECTOMY  2/11     VASCULAR SURGERY         Social History   Substance Use Topics     Smoking status: Former Smoker     Packs/day: 1.50     Years: 34.00     Types: Cigarettes     Quit date: 2/1/1984     Smokeless tobacco: Never Used      Comment: Nonsmoking  "household     Alcohol use Yes      Comment: occ     Family History   Problem Relation Age of Onset     CANCER Mother      leukemia     Depression Other      Depression Other      Prostate Cancer Brother      Prostate Cancer Brother          Current Outpatient Prescriptions   Medication Sig Dispense Refill     loratadine (CLARITIN) 10 MG tablet Take 1 tablet (10 mg) by mouth daily 90 tablet 3     metoprolol (TOPROL-XL) 50 MG 24 hr tablet Take 1 tablet (50 mg) by mouth daily 90 tablet 3     omeprazole (PRILOSEC) 20 MG CR capsule Take 1 capsule (20 mg) by mouth 2 times daily 180 capsule 3     naproxen sodium (ALEVE) 220 MG capsule Take 220 mg by mouth 2 times daily (with meals).       DAILY MULTIVITAMIN PO 1 tab daily.        MAGNESIUM OR 1 tablet daily       [DISCONTINUED] loratadine (CLARITIN) 10 MG tablet Take 1 tablet (10 mg) by mouth daily 90 tablet 1           Pneumonia Vaccine:completed    ROS:  Constitutional, HEENT, cardiovascular, pulmonary, gi and gu systems are negative, except as otherwise noted.      OBJECTIVE:   /70  Pulse 74  Temp 97.2  F (36.2  C) (Oral)  Ht 5' 7\" (1.702 m)  Wt 208 lb (94.3 kg)  SpO2 98%  BMI 32.58 kg/m2 Estimated body mass index is 32.58 kg/(m^2) as calculated from the following:    Height as of this encounter: 5' 7\" (1.702 m).    Weight as of this encounter: 208 lb (94.3 kg).  EXAM:   GENERAL: healthy, alert and no distress  EYES: Eyes grossly normal to inspection, PERRL and conjunctivae and sclerae normal  HENT: ear canals and TM's normal, nose and mouth without ulcers or lesions  NECK: no adenopathy, no asymmetry, masses, or scars and thyroid normal to palpation  RESP: lungs clear to auscultation - no rales, rhonchi or wheezes  CV: regular rate and rhythm, normal S1 S2, no S3 or S4, no murmur, click or rub, no peripheral edema and peripheral pulses strong  ABDOMEN: soft, nontender, no hepatosplenomegaly, no masses and bowel sounds normal  MS: no gross musculoskeletal " "defects noted, no edema  SKIN: no suspicious lesions or rashes  NEURO: Normal strength and tone, mentation intact and speech normal  PSYCH: mentation appears normal, affect normal/bright    ASSESSMENT / PLAN:   (Z00.00) Medicare annual wellness visit, subsequent  (primary encounter diagnosis)  Comment: preventive needs reviewed  Plan: **Lipid panel reflex to direct LDL FUTURE         anytime        see orders in Epic.     (C61) Malignant neoplasm of prostate (H)  (Z12.5) Encounter for screening for malignant neoplasm of prostate  Comment: no symptoms of recurrence  Plan: PSA, screen            (J30.2) Seasonal allergic rhinitis, unspecified chronicity, unspecified trigger  Comment: stable  Plan: loratadine (CLARITIN) 10 MG tablet        Refill x 1 yr     (Z23) Need for prophylactic vaccination and inoculation against influenza  Comment: due  Plan: FLU VACCINE, INCREASED ANTIGEN, PRESV FREE, AGE        65+ [41005], Vaccine Administration, Initial         [39691]              End of Life Planning:  Patient currently has an advanced directive: No.  I have verified the patient's ablity to prepare an advanced directive/make health care decisions.  Literature was provided to assist patient in preparing an advanced directive.    COUNSELING:  Reviewed preventive health counseling, as reflected in patient instructions  Special attention given to:       Regular exercise       Healthy diet/nutrition        Estimated body mass index is 32.58 kg/(m^2) as calculated from the following:    Height as of this encounter: 5' 7\" (1.702 m).    Weight as of this encounter: 208 lb (94.3 kg).  Weight management plan: Discussed healthy diet and exercise guidelines and patient will follow up in 12 months in clinic to re-evaluate.   reports that he quit smoking about 33 years ago. His smoking use included Cigarettes. He has a 51.00 pack-year smoking history. He has never used smokeless tobacco.        Appropriate preventive services were " discussed with this patient, including applicable screening as appropriate for cardiovascular disease, diabetes, osteopenia/osteoporosis, and glaucoma.  As appropriate for age/gender, discussed screening for colorectal cancer, prostate cancer, breast cancer, and cervical cancer. Checklist reviewing preventive services available has been given to the patient.    Reviewed patients plan of care and provided an AVS. The Basic Care Plan (routine screening as documented in Health Maintenance) for Rian meets the Care Plan requirement. This Care Plan has been established and reviewed with the Patient.    Counseling Resources:  ATP IV Guidelines  Pooled Cohorts Equation Calculator  Breast Cancer Risk Calculator  FRAX Risk Assessment  ICSI Preventive Guidelines  Dietary Guidelines for Americans, 2010  USDA's MyPlate  ASA Prophylaxis  Lung CA Screening    Junie Golden MD

## 2017-10-03 ENCOUNTER — OFFICE VISIT (OUTPATIENT)
Dept: FAMILY MEDICINE | Facility: CLINIC | Age: 72
End: 2017-10-03
Payer: MEDICARE

## 2017-10-03 VITALS
SYSTOLIC BLOOD PRESSURE: 117 MMHG | TEMPERATURE: 97.2 F | DIASTOLIC BLOOD PRESSURE: 70 MMHG | HEART RATE: 74 BPM | OXYGEN SATURATION: 98 % | HEIGHT: 67 IN | BODY MASS INDEX: 32.65 KG/M2 | WEIGHT: 208 LBS

## 2017-10-03 DIAGNOSIS — Z12.5 ENCOUNTER FOR SCREENING FOR MALIGNANT NEOPLASM OF PROSTATE: ICD-10-CM

## 2017-10-03 DIAGNOSIS — C61 MALIGNANT NEOPLASM OF PROSTATE (H): ICD-10-CM

## 2017-10-03 DIAGNOSIS — J30.2 SEASONAL ALLERGIC RHINITIS, UNSPECIFIED CHRONICITY, UNSPECIFIED TRIGGER: ICD-10-CM

## 2017-10-03 DIAGNOSIS — Z23 NEED FOR PROPHYLACTIC VACCINATION AND INOCULATION AGAINST INFLUENZA: ICD-10-CM

## 2017-10-03 DIAGNOSIS — Z00.00 MEDICARE ANNUAL WELLNESS VISIT, SUBSEQUENT: Primary | ICD-10-CM

## 2017-10-03 PROCEDURE — 90662 IIV NO PRSV INCREASED AG IM: CPT | Performed by: FAMILY MEDICINE

## 2017-10-03 PROCEDURE — G0439 PPPS, SUBSEQ VISIT: HCPCS | Performed by: FAMILY MEDICINE

## 2017-10-03 PROCEDURE — G0008 ADMIN INFLUENZA VIRUS VAC: HCPCS | Performed by: FAMILY MEDICINE

## 2017-10-03 RX ORDER — LORATADINE 10 MG/1
10 TABLET ORAL DAILY
Qty: 90 TABLET | Refills: 3 | Status: SHIPPED | OUTPATIENT
Start: 2017-10-03 | End: 2018-04-16

## 2017-10-03 NOTE — NURSING NOTE
"Chief Complaint   Patient presents with     Wellness Visit       Initial /70  Pulse 74  Temp 97.2  F (36.2  C) (Oral)  Ht 5' 7\" (1.702 m)  Wt 208 lb (94.3 kg)  SpO2 98%  BMI 32.58 kg/m2 Estimated body mass index is 32.58 kg/(m^2) as calculated from the following:    Height as of this encounter: 5' 7\" (1.702 m).    Weight as of this encounter: 208 lb (94.3 kg)..  BP completed using cuff size: leah Pickering CMA    "

## 2017-10-03 NOTE — PROGRESS NOTES
Injectable Influenza Immunization Documentation    1.  Is the person to be vaccinated sick today?   No    2. Does the person to be vaccinated have an allergy to a component   of the vaccine?   No    3. Has the person to be vaccinated ever had a serious reaction   to influenza vaccine in the past?   No    4. Has the person to be vaccinated ever had Guillain-Barré syndrome?   No    Form completed by Betzaida Pickering CMA

## 2017-10-03 NOTE — MR AVS SNAPSHOT
After Visit Summary   10/3/2017    Rian Ness    MRN: [id number]           Patient Information     Date Of Birth          1945        Visit Information        Provider Department      10/3/2017 8:55 AM Junie Golden MD [facility]        Today's Diagnoses     Medicare annual wellness visit, subsequent    -  1    Malignant neoplasm of prostate (H)        Seasonal allergic rhinitis, unspecified chronicity, unspecified trigger        Need for prophylactic vaccination and inoculation against influenza        Encounter for screening for malignant neoplasm of prostate           Care Instructions      Preventive Health Recommendations:       Male Ages 65 and over    Yearly exam:             See your health care provider every year in order to  o   Review health changes.   o   Discuss preventive care.    o   Review your medicines if your doctor has prescribed any.    Talk with your health care provider about whether you should have a test to screen for prostate cancer (PSA).    Every 3 years, have a diabetes test (fasting glucose). If you are at risk for diabetes, you should have this test more often.    Every 5 years, have a cholesterol test. Have this test more often if you are at risk for high cholesterol or heart disease.     Every 10 years, have a colonoscopy. Or, have a yearly FIT test (stool test). These exams will check for colon cancer.    Talk to with your health care provider about screening for Abdominal Aortic Aneurysm if you have a family history of AAA or have a history of smoking.  Shots:     Get a flu shot each year.     Get a tetanus shot every 10 years.     Talk to your doctor about your pneumonia vaccines. There are now two you should receive - Pneumovax (PPSV 23) and Prevnar (PCV 13).    Talk to your doctor about a shingles vaccine.     Talk to your doctor about the hepatitis B vaccine.  Nutrition:     Eat at least 5 servings of fruits and vegetables each  day.     Eat whole-grain bread, whole-wheat pasta and brown rice instead of white grains and rice.     Talk to your doctor about Calcium and Vitamin D.   Lifestyle    Exercise for at least 150 minutes a week (30 minutes a day, 5 days a week). This will help you control your weight and prevent disease.     Limit alcohol to one drink per day.     No smoking.     Wear sunscreen to prevent skin cancer.     See your dentist every six months for an exam and cleaning.     See your eye doctor every 1 to 2 years to screen for conditions such as glaucoma, macular degeneration and cataracts.          Follow-ups after your visit        Future tests that were ordered for you today     Open Future Orders        Priority Expected Expires Ordered    **Lipid panel reflex to direct LDL FUTURE anytime Routine 10/3/2017 10/3/2018 10/3/2017    PSA, screen Routine  10/3/2018 10/3/2017            Who to contact     If you have questions or need follow up information about today's clinic visit or your schedule please contact [facility] directly at [phone].  Normal or non-critical lab and imaging results will be communicated to you by CasaRomahart, letter or phone within 4 business days after the clinic has received the results. If you do not hear from us within 7 days, please contact the clinic through [employer] or phone. If you have a critical or abnormal lab result, we will notify you by phone as soon as possible.  Submit refill requests through #waywire or call your pharmacy and they will forward the refill request to us. Please allow 3 business days for your refill to be completed.          Additional Information About Your Visit        #waywire Information     #waywire gives you secure access to your electronic health record. If you see a primary care provider, you can also send messages to your care team and make appointments. If you have questions, please call your primary care clinic.  If you do not have a primary care  "provider, please call [phone] and they will assist you.        Care EveryWhere ID     This is your Care EveryWhere ID. This could be used by other organizations to access your [place] medical records  [id number]        Your Vitals Were     Pulse Temperature Height Pulse Oximetry BMI (Body Mass Index)       74 97.2  F (36.2  C) (Oral) 5' 7\" (1.702 m) 98% 32.58 kg/m2        Blood Pressure from Last 3 Encounters:   10/03/17 117/70   02/13/17 135/72   01/23/17 137/73    Weight from Last 3 Encounters:   10/03/17 208 lb (94.3 kg)   03/27/17 203 lb (92.1 kg)   02/13/17 208 lb (94.3 kg)              We Performed the Following     FLU VACCINE, INCREASED ANTIGEN, PRESV FREE, AGE 65+ [93367]     Vaccine Administration, Initial [29441]          Today's Medication Changes          These changes are accurate as of: 10/3/17  9:41 AM.  If you have any questions, ask your nurse or doctor.               Stop taking these medicines if you haven't already. Please contact your care team if you have questions.     ALPRAZolam 0.5 MG tablet   Commonly known as:  XANAX   Stopped by:  Junie Golden MD                Where to get your medicines      These medications were sent to [facility] [address] [place] [address] [address] [place] [address] AT SEC [address]  [address] [address]     Phone:  [phone]     loratadine 10 MG tablet                Primary Care Provider Office Phone # Fax #    Junie Golden [phone] [address] [facility] [address]        Equal Access to Services     GT [place] AH: Adam Crowley, aida rockwell, dania kaalmada rahel, colleen garcia. So [facility] [phone].    ATENCIÓN: Si habla español, tiene a ortega disposición servicios gratuitos de asistencia lingüística. Llame al [phone].    We comply with applicable federal civil rights laws and [place] laws. We do not " discriminate on the basis of race, color, national origin, age, disability, sex, sexual orientation, or gender identity.            Thank you!     Thank you for choosing [facility]  for your care. Our goal is always to provide you with excellent care. Hearing back from our patients is one way we can continue to improve our services. Please take a few minutes to complete the written survey that you may receive in the mail after your visit with us. Thank you!             Your Updated Medication List - Protect others around you: Learn how to safely use, store and throw away your medicines at www.disposemymeds.org.          This list is accurate as of: 10/3/17  9:41 AM.  Always use your most recent med list.                   Brand Name Dispense Instructions for use Diagnosis    ALEVE 220 MG capsule   Generic drug:  naproxen sodium      Take 220 mg by mouth 2 times daily (with meals).        DAILY MULTIVITAMIN PO      1 tab daily.        loratadine 10 MG tablet    CLARITIN    90 tablet    Take 1 tablet (10 mg) by mouth daily    Seasonal allergic rhinitis, unspecified chronicity, unspecified trigger       MAGNESIUM PO      1 tablet daily        metoprolol 50 MG 24 hr tablet    TOPROL-XL    90 tablet    Take 1 tablet (50 mg) by mouth daily    Essential hypertension with goal blood pressure less than 140/90       omeprazole 20 MG CR capsule    priLOSEC    180 capsule    Take 1 capsule (20 mg) by mouth 2 times daily    Epigastric pain

## 2017-11-15 DIAGNOSIS — Z00.00 MEDICARE ANNUAL WELLNESS VISIT, SUBSEQUENT: ICD-10-CM

## 2017-11-15 DIAGNOSIS — Z12.5 ENCOUNTER FOR SCREENING FOR MALIGNANT NEOPLASM OF PROSTATE: ICD-10-CM

## 2017-11-15 DIAGNOSIS — C61 MALIGNANT NEOPLASM OF PROSTATE (H): ICD-10-CM

## 2017-11-15 LAB
CHOLEST SERPL-MCNC: 165 MG/DL
HDLC SERPL-MCNC: 46 MG/DL
LDLC SERPL CALC-MCNC: 93 MG/DL
NONHDLC SERPL-MCNC: 119 MG/DL
PSA SERPL-ACNC: 0.04 UG/L (ref 0–4)
TRIGL SERPL-MCNC: 130 MG/DL

## 2017-11-15 PROCEDURE — 80061 LIPID PANEL: CPT | Performed by: FAMILY MEDICINE

## 2017-11-15 PROCEDURE — 36415 COLL VENOUS BLD VENIPUNCTURE: CPT | Performed by: FAMILY MEDICINE

## 2017-11-15 PROCEDURE — G0103 PSA SCREENING: HCPCS | Performed by: FAMILY MEDICINE

## 2017-12-11 ENCOUNTER — OFFICE VISIT (OUTPATIENT)
Dept: OPHTHALMOLOGY | Facility: CLINIC | Age: 72
End: 2017-12-11
Payer: MEDICARE

## 2017-12-11 DIAGNOSIS — Z96.1 PSEUDOPHAKIA: Primary | ICD-10-CM

## 2017-12-11 DIAGNOSIS — H52.4 PRESBYOPIA: ICD-10-CM

## 2017-12-11 DIAGNOSIS — H43.813 POSTERIOR VITREOUS DETACHMENT, BILATERAL: ICD-10-CM

## 2017-12-11 PROCEDURE — 92015 DETERMINE REFRACTIVE STATE: CPT | Mod: GY | Performed by: OPHTHALMOLOGY

## 2017-12-11 PROCEDURE — 92014 COMPRE OPH EXAM EST PT 1/>: CPT | Performed by: OPHTHALMOLOGY

## 2017-12-11 ASSESSMENT — VISUAL ACUITY
CORRECTION_TYPE: GLASSES
METHOD: SNELLEN - LINEAR
OS_CC: 20/20-1
OS_CC: J1
OD_CC: J1
OD_CC: 20/20

## 2017-12-11 ASSESSMENT — REFRACTION_MANIFEST
OS_AXIS: 180
OD_SPHERE: -0.75
OS_ADD: +2.50
OD_ADD: +2.50
OS_CYLINDER: +1.25
OS_SPHERE: -1.00
OD_AXIS: 175
OD_CYLINDER: +1.25

## 2017-12-11 ASSESSMENT — CONF VISUAL FIELD
OD_NORMAL: 1
OS_NORMAL: 1

## 2017-12-11 ASSESSMENT — REFRACTION_WEARINGRX
OS_AXIS: 160
OS_SPHERE: -1.00
OD_ADD: +2.50
OD_CYLINDER: +0.75
OS_CYLINDER: +1.00
SPECS_TYPE: BIFOCAL
OS_ADD: +2.50
OD_AXIS: 177
OD_SPHERE: -0.50

## 2017-12-11 ASSESSMENT — TONOMETRY
OS_IOP_MMHG: 18
IOP_METHOD: APPLANATION
OD_IOP_MMHG: 18

## 2017-12-11 ASSESSMENT — SLIT LAMP EXAM - LIDS
COMMENTS: 2+ DERMATOCHALASIS - UPPER LID
COMMENTS: 2+ DERMATOCHALASIS - UPPER LID

## 2017-12-11 ASSESSMENT — CUP TO DISC RATIO
OD_RATIO: 0.2
OS_RATIO: 0.4

## 2017-12-11 ASSESSMENT — EXTERNAL EXAM - RIGHT EYE: OD_EXAM: 2+ BROW PTOSIS, PROLAPSED FAT PADS: LOWER

## 2017-12-11 ASSESSMENT — EXTERNAL EXAM - LEFT EYE: OS_EXAM: 2+ BROW PTOSIS, PROLAPSED FAT PADS: LOWER

## 2017-12-11 NOTE — PATIENT INSTRUCTIONS
Glasses Rx given - optional  Possible clouding of posterior capsule discussed.  Use artificial tears up to 4 times daily both eyes. (Refresh Tears or Systane Ultra/Balance)  Call in August 2018 for an appointment in December 2018 for Complete Exam    Dr. Abebe [phone]

## 2017-12-11 NOTE — PROGRESS NOTES
Current Eye Medications:  no     Subjective:  Comprehensive eye exam. Patient reports is seeing well, vision seems unchanged and states eyes seem otherwise fine.Pt states his glasses are scratched plans to get new ones.      Objective:  See Ophthalmology Exam.       Assessment:  Stable eye exam.      ICD-10-CM    1. Pseudophakia,ou Z96.1 EYE EXAM (SIMPLE-NONBILLABLE)   2. Posterior vitreous detachment, bilateral H43.813    3. Presbyopia H52.4 REFRACTIVE STATUS        Plan:  Glasses Rx given - optional  Possible clouding of posterior capsule discussed.  Use artificial tears up to 4 times daily both eyes. (Refresh Tears or Systane Ultra/Balance)  Call in August 2018 for an appointment in December 2018 for Complete Exam    Dr. Abebe [phone]

## 2017-12-11 NOTE — MR AVS SNAPSHOT
After Visit Summary   12/11/2017    Rian Ness    MRN: [id number]           Patient Information     Date Of Birth          1945        Visit Information        Provider Department      12/11/2017 9:00 AM Jared Abebe MD [facility]        Today's Diagnoses     Presbyopia    -  1      Care Instructions    Glasses Rx given - optional  Possible clouding of posterior capsule discussed.  Use artificial tears up to 4 times daily both eyes. (Refresh Tears or Systane Ultra/Balance)  Call in August 2018 for an appointment in December 2018 for Complete Exam    Dr. Abebe [phone]          Follow-ups after your visit        Who to contact     If you have questions or need follow up information about today's clinic visit or your schedule please contact [facility] directly at [phone].  Normal or non-critical lab and imaging results will be communicated to you by Froonthart, letter or phone within 4 business days after the clinic has received the results. If you do not hear from us within 7 days, please contact the clinic through Froonthart or phone. If you have a critical or abnormal lab result, we will notify you by phone as soon as possible.  Submit refill requests through FitWithMe or call your pharmacy and they will forward the refill request to us. Please allow 3 business days for your refill to be completed.          Additional Information About Your Visit        MyChart Information     FitWithMe gives you secure access to your electronic health record. If you see a primary care provider, you can also send messages to your care team and make appointments. If you have questions, please call your primary care clinic.  If you do not have a primary care provider, please call [phone] and they will assist you.        Care EveryWhere ID     This is your Care EveryWhere ID. This could be used by other organizations to access your [facility]  records  [id number]         Blood Pressure from Last 3 Encounters:   10/03/17 117/70   02/13/17 135/72   01/23/17 137/73    Weight from Last 3 Encounters:   10/03/17 94.3 kg (208 lb)   03/27/17 92.1 kg (203 lb)   02/13/17 94.3 kg (208 lb)              Today, you had the following     No orders found for display       Primary Care Provider Office Phone # Fax #    Junie La Golden [phone] [address] [place] [address]        Equal Access to Services     [facility]: Hadii aad ku hadasho Soomaali, waaxda luqadaha, qaybta kaalmada adeegyada, colleen parker . So [facility] [phone].    ATENCIÓN: Si habla español, tiene a ortega disposición servicios gratuitos de asistencia lingüística. [facility] [phone].    We comply with applicable federal civil rights laws and [place] laws. We do not discriminate on the basis of race, color, national origin, age, disability, sex, sexual orientation, or gender identity.            Thank you!     [facility] you [facility]  [facility] your [facility]. Our goal is always to provide you with excellent care. Hearing back from our patients is one way we can continue to improve our services. Please take a few minutes to complete the written survey that you may receive in the mail after your visit with us. Thank you!             Your Updated Medication List - Protect others around you: Learn how to safely use, store and throw away your medicines at www.disposemymeds.org.          This list is accurate as of: 12/11/17  9:53 AM.  Always use your most recent med list.                   Brand Name Dispense Instructions for use Diagnosis    ALEVE 220 MG capsule   Generic drug:  naproxen sodium      Take 220 mg by mouth 2 times daily (with meals).        DAILY MULTIVITAMIN PO      1 tab daily.        loratadine 10 MG tablet    CLARITIN    90 tablet    Take 1 tablet (10 mg) by mouth daily    Seasonal allergic rhinitis,  unspecified chronicity, unspecified trigger       MAGNESIUM PO      1 tablet daily        metoprolol 50 MG 24 hr tablet    TOPROL-XL    90 tablet    Take 1 tablet (50 mg) by mouth daily    Essential hypertension with goal blood pressure less than 140/90       omeprazole 20 MG CR capsule    priLOSEC    180 capsule    Take 1 capsule (20 mg) by mouth 2 times daily    Epigastric pain

## 2018-02-23 NOTE — PROGRESS NOTES
SUBJECTIVE:   Rian Ness is a 72 year old male who presents to clinic today for the following health issues:      Hypertension Follow-up      Outpatient blood pressures are being checked at home.  Results are normal.    Low Salt Diet: no    Hypertension ROS: taking medications as instructed, no medication side effects noted, no TIA's, no chest pain on exertion, no dyspnea on exertion, no swelling of ankles.  No headache or visual changes.       Amount of exercise or physical activity: 4-5 days/week, active lifestyle    Problems taking medications regularly: No    Medication side effects: none    Diet: regular (no restrictions)          Problem list and histories reviewed & adjusted, as indicated.  Additional history: as documented    Patient Active Problem List   Diagnosis     Testicular hypofunction     Osteoarthritis, knee     Malignant neoplasm of prostate (H)     CARDIOVASCULAR SCREENING; LDL GOAL LESS THAN 130     Advanced directives, counseling/discussion     Status post total knee replacement     AC (acromioclavicular) joint arthritis     Biceps tendonitis     Pseudophakia,ou     Hypertension goal BP (blood pressure) < 140/90     Gastroesophageal reflux disease without esophagitis     Posterior vitreous detachment, bilateral     S/P complete repair of rotator cuff     Past Surgical History:   Procedure Laterality Date     ABDOMEN SURGERY       ARTHROSCOPY KNEE RT/LT  2000    right     ARTHROSCOPY KNEE RT/LT  1998    left     C TOTAL KNEE ARTHROPLASTY  10/11    right knee     COLONOSCOPY       PHACOEMULSIFICATION WITH STANDARD INTRAOCULAR LENS IMPLANT  1/2010; 2/2010    left eye; right eye     SUPRAPUBIC PROSTATECTOMY  2/11     VASCULAR SURGERY         Social History   Substance Use Topics     Smoking status: Former Smoker     Packs/day: 1.50     Years: 34.00     Types: Cigarettes     Quit date: 2/1/1984     Smokeless tobacco: Never Used      Comment: Nonsmoking household     Alcohol use Yes       Comment: occ     Family History   Problem Relation Age of Onset     CANCER Mother      leukemia     Depression Other      Depression Other      Prostate Cancer Brother      Prostate Cancer Brother          Current Outpatient Prescriptions   Medication Sig Dispense Refill     metoprolol succinate (TOPROL-XL) 50 MG 24 hr tablet Take 1 tablet (50 mg) by mouth daily 90 tablet 1     omeprazole (PRILOSEC) 20 MG CR capsule Take 1 capsule (20 mg) by mouth 2 times daily 180 capsule 3     loratadine (CLARITIN) 10 MG tablet Take 1 tablet (10 mg) by mouth daily 90 tablet 3     naproxen sodium (ALEVE) 220 MG capsule Take 220 mg by mouth 2 times daily (with meals).       DAILY MULTIVITAMIN PO 1 tab daily.        MAGNESIUM OR 1 tablet daily       [DISCONTINUED] metoprolol (TOPROL-XL) 50 MG 24 hr tablet Take 1 tablet (50 mg) by mouth daily 90 tablet 3     BP Readings from Last 3 Encounters:   02/26/18 112/68   10/03/17 117/70   02/13/17 135/72    Wt Readings from Last 3 Encounters:   02/26/18 207 lb (93.9 kg)   10/03/17 208 lb (94.3 kg)   03/27/17 203 lb (92.1 kg)                  Labs reviewed in EPIC    Reviewed and updated as needed this visit by clinical staff  Tobacco  Allergies  Meds  Problems       Reviewed and updated as needed this visit by Provider  Allergies  Meds  Problems         ROS:  Constitutional, HEENT, cardiovascular, pulmonary, gi and gu systems are negative, except as otherwise noted.    OBJECTIVE:     /68  Pulse 80  Temp 98.4  F (36.9  C) (Oral)  Wt 207 lb (93.9 kg)  SpO2 96%  BMI 32.42 kg/m2  Body mass index is 32.42 kg/(m^2).  GENERAL: healthy, alert and no distress  EYES: Eyes grossly normal to inspection, PERRL and conjunctivae and sclerae normal  NECK: no adenopathy, no asymmetry, masses, or scars and thyroid normal to palpation  RESP: lungs clear to auscultation - no rales, rhonchi or wheezes  CV: regular rate and rhythm, normal S1 S2, no S3 or S4, no murmur, click or rub, no peripheral  edema and peripheral pulses strong  MS: no gross musculoskeletal defects noted, no edema  SKIN: no suspicious lesions or rashes  PSYCH: mentation appears normal, affect normal/bright    Diagnostic Test Results:  none     ASSESSMENT/PLAN:     (I10) Essential hypertension with goal blood pressure less than 140/90  Comment: to goal  Plan: Basic metabolic panel  (Ca, Cl, CO2, Creat,         Gluc, K, Na, BUN), metoprolol succinate         (TOPROL-XL) 50 MG 24 hr tablet         Refill x 6 months f/u 6 months for OV and labs - wellness exam    (R10.13) Epigastric pain  Comment: controlled GERD  Plan: omeprazole (PRILOSEC) 20 MG CR capsule        Refill x 1 yr       See Patient Instructions    Junie Golden MD  [facility]

## 2018-02-26 ENCOUNTER — MYC MEDICAL ADVICE (OUTPATIENT)
Dept: FAMILY MEDICINE | Facility: CLINIC | Age: 73
End: 2018-02-26

## 2018-02-26 ENCOUNTER — OFFICE VISIT (OUTPATIENT)
Dept: FAMILY MEDICINE | Facility: CLINIC | Age: 73
End: 2018-02-26
Payer: MEDICARE

## 2018-02-26 VITALS
DIASTOLIC BLOOD PRESSURE: 68 MMHG | OXYGEN SATURATION: 96 % | WEIGHT: 207 LBS | HEART RATE: 80 BPM | SYSTOLIC BLOOD PRESSURE: 112 MMHG | BODY MASS INDEX: 32.42 KG/M2 | TEMPERATURE: 98.4 F

## 2018-02-26 DIAGNOSIS — R10.13 EPIGASTRIC PAIN: ICD-10-CM

## 2018-02-26 DIAGNOSIS — I10 ESSENTIAL HYPERTENSION WITH GOAL BLOOD PRESSURE LESS THAN 140/90: ICD-10-CM

## 2018-02-26 LAB
ANION GAP SERPL CALCULATED.3IONS-SCNC: 8 MMOL/L (ref 3–14)
BUN SERPL-MCNC: 20 MG/DL (ref 7–30)
CALCIUM SERPL-MCNC: 9 MG/DL (ref 8.5–10.1)
CHLORIDE SERPL-SCNC: 106 MMOL/L (ref 94–109)
CO2 SERPL-SCNC: 28 MMOL/L (ref 20–32)
CREAT SERPL-MCNC: 0.92 MG/DL (ref 0.66–1.25)
GFR SERPL CREATININE-BSD FRML MDRD: 80 ML/MIN/1.7M2
GLUCOSE SERPL-MCNC: 122 MG/DL (ref 70–99)
POTASSIUM SERPL-SCNC: 4.2 MMOL/L (ref 3.4–5.3)
SODIUM SERPL-SCNC: 142 MMOL/L (ref 133–144)

## 2018-02-26 PROCEDURE — 36415 COLL VENOUS BLD VENIPUNCTURE: CPT | Performed by: FAMILY MEDICINE

## 2018-02-26 PROCEDURE — 99213 OFFICE O/P EST LOW 20 MIN: CPT | Performed by: FAMILY MEDICINE

## 2018-02-26 PROCEDURE — 80048 BASIC METABOLIC PNL TOTAL CA: CPT | Performed by: FAMILY MEDICINE

## 2018-02-26 RX ORDER — METOPROLOL SUCCINATE 50 MG/1
50 TABLET, EXTENDED RELEASE ORAL DAILY
Qty: 90 TABLET | Refills: 1 | Status: SHIPPED | OUTPATIENT
Start: 2018-02-26 | End: 2018-02-27

## 2018-02-26 NOTE — MR AVS SNAPSHOT
After Visit Summary   2/26/2018    Rian Ness    MRN: [id number]           Patient Information     Date Of Birth          1945        Visit Information        Provider Department      2/26/2018 9:00 AM Junie Golden MD [facility]        Today's Diagnoses     Essential hypertension with goal blood pressure less than 140/90        Epigastric pain           Follow-ups after your visit        Follow-up notes from your care team     Return in about 6 months (around 8/26/2018) for Physical Exam, Lab Work  wellness exam.      Who to contact     If you have questions or need follow up information about today's clinic visit or your schedule please contact [facility] directly at [phone].  Normal or non-critical lab and imaging results will be communicated to you by MyChart, letter or phone within 4 business days after the clinic has received the results. If you do not hear from us within 7 days, please contact the clinic through Sleep Solutionshart or phone. If you have a critical or abnormal lab result, we will notify you by phone as soon as possible.  Submit refill requests through BrandBoards or call your pharmacy and they will forward the refill request to us. Please allow 3 business days for your refill to be completed.          Additional Information About Your Visit        MyChart Information     BrandBoards gives you secure access to your electronic health record. If you see a primary care provider, you can also send messages to your care team and make appointments. If you have questions, please call your primary care clinic.  If you do not have a primary care provider, please call [phone] and they will assist you.        Care EveryWhere ID     This is your Care EveryWhere ID. This could be used by other organizations to access your [place] medical records  [id number]        Your Vitals Were     Pulse Temperature Pulse Oximetry BMI (Body Mass Index)          80  98.4  F (36.9  C) (Oral) 96% 32.42 kg/m2         Blood Pressure from Last 3 Encounters:   02/26/18 112/68   10/03/17 117/70   02/13/17 135/72    Weight from Last 3 Encounters:   02/26/18 207 lb (93.9 kg)   10/03/17 208 lb (94.3 kg)   03/27/17 203 lb (92.1 kg)              We Performed the Following     Basic metabolic panel  (Ca, Cl, CO2, Creat, Gluc, K, Na, BUN)          Where to get your medicines      These medications were sent to [facility] [id number] [place] [address] [address] [facility] AT SEC of [facility] [place]  [address] [facility] [address]     Phone:  [phone]     metoprolol succinate 50 MG 24 hr tablet    omeprazole 20 MG CR capsule          Primary Care Provider Office Phone # Fax #    Junie Golden [phone] [address] [facility] [address]        Equal Access to Services     [facility]: Hadii aad ku hadasho Soomaali, waaxda luqadaha, qaybta kaalmada adeegyaeloina, colleen parker . So [facility] [phone].    ATENCIÓN: Si habla español, tiene a ortega disposición servicios gratuitos de asistencia lingüística. [facility] [phone].    We comply with applicable federal civil rights laws and [place] laws. We do not discriminate on the basis of race, color, national origin, age, disability, sex, sexual orientation, or gender identity.            Thank you!     Thank you for choosing [facility]  for your care. Our goal is always to provide you with excellent care. Hearing back from our patients is one way we can continue to improve our services. Please take a few minutes to complete the written survey that you may receive in the mail after your visit with us. Thank you!             Your Updated Medication List - Protect others around you: Learn how to safely use, store and throw away your medicines at www.disposemymeds.org.          This list is accurate as of 2/26/18  9:39 AM.  Always use your most  recent med list.                   Brand Name Dispense Instructions for use Diagnosis    ALEVE 220 MG capsule   Generic drug:  naproxen sodium      Take 220 mg by mouth 2 times daily (with meals).        DAILY MULTIVITAMIN PO      1 tab daily.        loratadine 10 MG tablet    CLARITIN    90 tablet    Take 1 tablet (10 mg) by mouth daily    Seasonal allergic rhinitis, unspecified chronicity, unspecified trigger       MAGNESIUM PO      1 tablet daily        metoprolol succinate 50 MG 24 hr tablet    TOPROL-XL    90 tablet    Take 1 tablet (50 mg) by mouth daily    Essential hypertension with goal blood pressure less than 140/90       omeprazole 20 MG CR capsule    priLOSEC    180 capsule    Take 1 capsule (20 mg) by mouth 2 times daily    Epigastric pain

## 2018-02-27 RX ORDER — METOPROLOL SUCCINATE 50 MG/1
50 TABLET, EXTENDED RELEASE ORAL DAILY
Qty: 90 TABLET | Refills: 1 | Status: SHIPPED | OUTPATIENT
Start: 2018-02-27 | End: 2018-10-09

## 2018-02-27 NOTE — TELEPHONE ENCOUNTER
Per patient, prescriptions that prescribe yesterday at office visit, were suppose to go to [facility] pharmacy.    [facility] pharmacy is called to cancel Metoprolol and Omeprazole prescriptions that were prescribe yesterday.   New prescriptions sent to [facility].    Patient informed that this has been done.  Verbalized good understanding.   Llvuia Corey RN

## 2018-04-16 ENCOUNTER — OFFICE VISIT (OUTPATIENT)
Dept: INTERNAL MEDICINE | Facility: CLINIC | Age: 73
End: 2018-04-16
Payer: MEDICARE

## 2018-04-16 VITALS
DIASTOLIC BLOOD PRESSURE: 69 MMHG | BODY MASS INDEX: 31.32 KG/M2 | SYSTOLIC BLOOD PRESSURE: 119 MMHG | OXYGEN SATURATION: 98 % | WEIGHT: 200 LBS | RESPIRATION RATE: 20 BRPM | HEART RATE: 82 BPM | TEMPERATURE: 97.6 F

## 2018-04-16 DIAGNOSIS — R07.0 THROAT PAIN: Primary | ICD-10-CM

## 2018-04-16 DIAGNOSIS — R09.82 POST-NASAL DRIP: ICD-10-CM

## 2018-04-16 LAB
DEPRECATED S PYO AG THROAT QL EIA: NORMAL
SPECIMEN SOURCE: NORMAL

## 2018-04-16 PROCEDURE — 99214 OFFICE O/P EST MOD 30 MIN: CPT | Performed by: INTERNAL MEDICINE

## 2018-04-16 PROCEDURE — 87081 CULTURE SCREEN ONLY: CPT | Performed by: INTERNAL MEDICINE

## 2018-04-16 PROCEDURE — 87880 STREP A ASSAY W/OPTIC: CPT | Performed by: INTERNAL MEDICINE

## 2018-04-16 RX ORDER — FEXOFENADINE HCL 180 MG/1
180 TABLET ORAL DAILY
Qty: 30 TABLET | Refills: 1 | Status: SHIPPED | OUTPATIENT
Start: 2018-04-16 | End: 2018-10-09

## 2018-04-16 RX ORDER — FLUTICASONE PROPIONATE 50 MCG
1-2 SPRAY, SUSPENSION (ML) NASAL DAILY
Qty: 1 BOTTLE | Refills: 11 | Status: SHIPPED | OUTPATIENT
Start: 2018-04-16 | End: 2018-10-09

## 2018-04-16 NOTE — PATIENT INSTRUCTIONS
Please stop the Loratidine and start Allegra.  Also start using Flonase (nasal spray).  Let us know if your symptoms are not any better in 2 weeks.

## 2018-04-16 NOTE — MR AVS SNAPSHOT
After Visit Summary   4/16/2018    Rian Ness    MRN: [id number]           Patient Information     Date Of Birth          1945        Visit Information        Provider Department      4/16/2018 10:10 AM Lilo Salazar MD [facility]        Today's Diagnoses     Throat pain    -  1    Post-nasal drip          Care Instructions    Please stop the Loratidine and start Allegra.  Also start using Flonase (nasal spray).  Let us know if your symptoms are not any better in 2 weeks.           Follow-ups after your visit        Who to contact     If you have questions or need follow up information about today's clinic visit or your schedule please contact [facility] directly at [phone].  Normal or non-critical lab and imaging results will be communicated to you by MyChart, letter or phone within 4 business days after the clinic has received the results. If you do not hear from us within 7 days, please contact the clinic through Beezaghart or phone. If you have a critical or abnormal lab result, we will notify you by phone as soon as possible.  Submit refill requests through TopPatch or call your pharmacy and they will forward the refill request to us. Please allow 3 business days for your refill to be completed.          Additional Information About Your Visit        MyChart Information     TopPatch gives you secure access to your electronic health record. If you see a primary care provider, you can also send messages to your care team and make appointments. If you have questions, please call your primary care clinic.  If you do not have a primary care provider, please call [phone] and they will assist you.        Care EveryWhere ID     This is your Care EveryWhere ID. This could be used by other organizations to access your [place] medical records  [id number]        Your Vitals Were     Pulse Temperature Respirations Pulse Oximetry BMI (Body Mass  Index)       82 97.6  F (36.4  C) (Pulmonary Artery) 20 98% 31.32 kg/m2        Blood Pressure from Last 3 Encounters:   04/16/18 119/69   02/26/18 112/68   10/03/17 117/70    Weight from Last 3 Encounters:   04/16/18 200 lb (90.7 kg)   02/26/18 207 lb (93.9 kg)   10/03/17 208 lb (94.3 kg)              We Performed the Following     Beta strep group A culture     Strep, Rapid Screen          Today's Medication Changes          These changes are accurate as of 4/16/18 11:27 AM.  If you have any questions, ask your nurse or doctor.               Start taking these medicines.        Dose/Directions    fexofenadine 180 MG tablet   Commonly known as:  ALLEGRA   Used for:  Post-nasal drip   Started by:  iLlo Salazar MD        Dose:  180 mg   Take 1 tablet (180 mg) by mouth daily   Quantity:  30 tablet   Refills:  1       fluticasone 50 MCG/ACT spray   Commonly known as:  FLONASE   Used for:  Post-nasal drip   Started by:  Lilo Salazar MD        Dose:  1-2 spray   Spray 1-2 sprays into both nostrils daily   Quantity:  1 Bottle   Refills:  11         Stop taking these medicines if you haven't already. Please contact your care team if you have questions.     loratadine 10 MG tablet   Commonly known as:  CLARITIN   Stopped by:  Lilo Salazar MD                Where to get your medicines      These medications were sent to [facility] [address] [place] [address] [address] [facility] AT SEC of [address]  [address] [facility] [address]     Phone:  [phone]     fexofenadine 180 MG tablet    fluticasone 50 MCG/ACT spray                Primary Care Provider Office Phone # Fax #    Junie Golden [phone] [address] [facility] [address]        Equal Access to Services     [facility]: Adam Crowley, aida rockwell, dania mcginnis, colleen garcia. Hollie [facility]  [phone].    ATENCIÓN: Si emil hector, tiene a ortega disposición servicios gratuitos de asistencia lingüística. Beni rowe [phone].    We comply with applicable federal civil rights laws and [place] laws. We do not discriminate on the basis of race, color, national origin, age, disability, sex, sexual orientation, or gender identity.            Thank you!     Thank you for choosing [facility]  for your care. Our goal is always to provide you with excellent care. Hearing back from our patients is one way we can continue to improve our services. Please take a few minutes to complete the written survey that you may receive in the mail after your visit with us. Thank you!             Your Updated Medication List - Protect others around you: Learn how to safely use, store and throw away your medicines at www.disposemymeds.org.          This list is accurate as of 4/16/18 11:27 AM.  Always use your most recent med list.                   Brand Name Dispense Instructions for use Diagnosis    ALEVE 220 MG capsule   Generic drug:  naproxen sodium      Take 220 mg by mouth 2 times daily (with meals).        DAILY MULTIVITAMIN PO      1 tab daily.        fexofenadine 180 MG tablet    ALLEGRA    30 tablet    Take 1 tablet (180 mg) by mouth daily    Post-nasal drip       fluticasone 50 MCG/ACT spray    FLONASE    1 Bottle    Spray 1-2 sprays into both nostrils daily    Post-nasal drip       MAGNESIUM PO      1 tablet daily        metoprolol succinate 50 MG 24 hr tablet    TOPROL-XL    90 tablet    Take 1 tablet (50 mg) by mouth daily    Essential hypertension with goal blood pressure less than 140/90       omeprazole 20 MG CR capsule    priLOSEC    180 capsule    Take 1 capsule (20 mg) by mouth 2 times daily    Epigastric pain

## 2018-04-16 NOTE — PROGRESS NOTES
SUBJECTIVE:   Rian Ness is a 72 year old male who presents to clinic today for the following health issues:      RESPIRATORY SYMPTOMS      Duration: 1 month    Description  sore throat, facial pain/pressure, cough and headache    Severity: moderate    Accompanying signs and symptoms: None    History (predisposing factors):  none    Precipitating or alleviating factors: None    Therapies tried and outcome:  mucinex        Temperatures at home: patient has not been actively tracking this.     Nasal secretions: clear with blood, the last few days; cough: sometimes productive of clear sputum.  He does endorse post-nasal drip.     No recent flareup of the heartburn.  He may be a bit more gassy than usual lately.  He does take naproxen daily.   he is not aware of any particular allergens. No real hay fever symptoms.     He is more tired than usual for the past month;   He does snore at night.         Reviewed and updated as needed this visit by clinical staff  Tobacco  Allergies  Meds  Problems       Reviewed and updated as needed this visit by Provider  Meds  Problems           Patient Active Problem List   Diagnosis     Testicular hypofunction     Osteoarthritis, knee     Malignant neoplasm of prostate (H)     CARDIOVASCULAR SCREENING; LDL GOAL LESS THAN 130     Advanced directives, counseling/discussion     Status post total knee replacement     AC (acromioclavicular) joint arthritis     Biceps tendonitis     Pseudophakia,ou     Hypertension goal BP (blood pressure) < 140/90     Gastroesophageal reflux disease without esophagitis     Posterior vitreous detachment, bilateral     S/P complete repair of rotator cuff       Past Medical History:   Diagnosis Date     Arthritis      Cancer (H)      Depressive disorder      HTN        Past Surgical History:   Procedure Laterality Date     ABDOMEN SURGERY       ARTHROSCOPY KNEE RT/LT  2000    right     ARTHROSCOPY KNEE RT/LT  1998    left     C TOTAL KNEE  ARTHROPLASTY  10/11    right knee     COLONOSCOPY       PHACOEMULSIFICATION WITH STANDARD INTRAOCULAR LENS IMPLANT  1/2010; 2/2010    left eye; right eye     SUPRAPUBIC PROSTATECTOMY  2/11     VASCULAR SURGERY         Family History   Problem Relation Age of Onset     CANCER Mother      leukemia     Depression Other      Depression Other      Prostate Cancer Brother      Prostate Cancer Brother        Social History   Substance Use Topics     Smoking status: Former Smoker     Packs/day: 1.50     Years: 34.00     Types: Cigarettes     Quit date: 2/1/1984     Smokeless tobacco: Never Used      Comment: Nonsmoking household     Alcohol use Yes      Comment: occ       Current Outpatient Prescriptions   Medication     DAILY MULTIVITAMIN PO     fexofenadine (ALLEGRA) 180 MG tablet     fluticasone (FLONASE) 50 MCG/ACT spray     MAGNESIUM OR     metoprolol succinate (TOPROL-XL) 50 MG 24 hr tablet     naproxen sodium (ALEVE) 220 MG capsule     omeprazole (PRILOSEC) 20 MG CR capsule     No current facility-administered medications for this visit.          ROS:  Constitutional, HEENT, cardiovascular, pulmonary, GI, , musculoskeletal, neuro, skin, endocrine and psych systems are negative, except as otherwise noted.     OBJECTIVE:                                                    /69  Pulse 82  Temp 97.6  F (36.4  C) (Pulmonary Artery)  Resp 20  Wt 200 lb (90.7 kg)  SpO2 98%  BMI 31.32 kg/m2     GENERAL APPEARANCE: healthy, alert and in no distress  EYES: Eyes grossly normal to inspection, and conjunctivae and sclerae normal  HENT: head normocephalic and atraumatic and mouth without ulcers or lesions, oropharynx clear and oral mucous membranes moist  NECK: no noticeable adenopathy, no asymmetry, masses, or scars   RESP:  faint wheeze heard once on lung exam.  Mild epigastric tender to palpation; +1 leg edema, b/l in compression stockings.   ow, lungs clear to auscultation - no rales, rhonchi or wheezes  CV:  regular rate and rhythm, normal S1 S2, no S3 or S4, no murmur, click or rub, no peripheral edema and peripheral pulses strong  ABDOMEN: soft, nontender, no hepatosplenomegaly, no masses and bowel sounds normal  MS: no musculoskeletal defects are noted and gait is age appropriate without ataxia  SKIN: no suspicious lesions or rashes  NEURO: mentation intact and speech normal  PSYCH: mentation appears normal and affect normal/bright.    Results for orders placed or performed in visit on 04/16/18   Strep, Rapid Screen   Result Value Ref Range    Specimen Description Throat     Rapid Strep A Screen       NEGATIVE: No Group A streptococcal antigen detected by immunoassay, await culture report.   Beta strep group A culture   Result Value Ref Range    Specimen Description Throat     Culture Micro No beta hemolytic Streptococcus Group A isolated        No results found for this or any previous visit (from the past 744 hour(s)).      ASSESSMENT/PLAN:                                                        ICD-10-CM    1. Throat pain R07.0 Strep, Rapid Screen     Beta strep group A culture   2. Post-nasal drip R09.82 fluticasone (FLONASE) 50 MCG/ACT spray     fexofenadine (ALLEGRA) 180 MG tablet       Patient Instructions   Please stop the Loratidine and start Allegra.  Also start using Flonase (nasal spray).  Let us know if your symptoms are not any better in 2 weeks.       Lilo Salazar MD    [facility]  [address] [facility] [address] [phone]

## 2018-04-16 NOTE — NURSING NOTE
"Chief Complaint   Patient presents with     Cough     Pharyngitis     Sinus Problem     Headache       Initial /69  Pulse 82  Temp 97.6  F (36.4  C) (Pulmonary Artery)  Resp 20  Wt 200 lb (90.7 kg)  SpO2 98%  BMI 31.32 kg/m2 Estimated body mass index is 31.32 kg/(m^2) as calculated from the following:    Height as of 10/3/17: 5' 7\" (1.702 m).    Weight as of this encounter: 200 lb (90.7 kg).    Taylor Lamar CMA    "

## 2018-04-17 LAB
BACTERIA SPEC CULT: NORMAL
SPECIMEN SOURCE: NORMAL

## 2018-09-26 ENCOUNTER — ALLIED HEALTH/NURSE VISIT (OUTPATIENT)
Dept: NURSING | Facility: CLINIC | Age: 73
End: 2018-09-26
Payer: MEDICARE

## 2018-09-26 DIAGNOSIS — Z23 NEED FOR PROPHYLACTIC VACCINATION AND INOCULATION AGAINST INFLUENZA: Primary | ICD-10-CM

## 2018-09-26 PROCEDURE — 99207 ZZC NO CHARGE NURSE ONLY: CPT

## 2018-09-26 PROCEDURE — 90662 IIV NO PRSV INCREASED AG IM: CPT

## 2018-09-26 PROCEDURE — G0008 ADMIN INFLUENZA VIRUS VAC: HCPCS

## 2018-09-26 NOTE — PROGRESS NOTES

## 2018-09-26 NOTE — MR AVS SNAPSHOT
After Visit Summary   9/26/2018    Rian Ness    MRN: [id number]           Patient Information     Date Of Birth          1945        Visit Information        Provider Department      9/26/2018 9:00 AM AN FLU CLINIC [facility]        Today's Diagnoses     Need for prophylactic vaccination and inoculation against influenza    -  1       Follow-ups after your visit        Your next 10 appointments already scheduled     Dec 10, 2018  9:00 AM CST   New Visit with Jared Abebe MD   [facility] ([place] [id number] [facility] [address] [phone]              Who to contact     If you have questions or need follow up information about today's clinic visit or your schedule please contact [facility] directly at [phone].  Normal or non-critical lab and imaging results will be communicated to you by MyChart, letter or phone within 4 business days after the clinic has received the results. If you do not hear from us within 7 days, please contact the clinic through MyChart or phone. If you have a critical or abnormal lab result, we will notify you by phone as soon as possible.  Submit refill requests through [employer] or call your pharmacy and they will forward the refill request to us. Please allow 3 business days for your refill to be completed.          Additional Information About Your Visit        MyChart Information     [employer] gives you secure access to your electronic health record. If you see a primary care provider, you can also send messages to your care team and make appointments. If you have questions, please call your primary care clinic.  If you do not have a primary care provider, please call [phone] and they will assist you.        Care EveryWhere ID     This is your Care EveryWhere ID. This could be used by other organizations to access your [facility] medical records  [id number]         Blood  Pressure from Last 3 Encounters:   04/16/18 119/69   02/26/18 112/68   10/03/17 117/70    Weight from Last 3 Encounters:   04/16/18 200 lb (90.7 kg)   02/26/18 207 lb (93.9 kg)   10/03/17 208 lb (94.3 kg)              We Performed the Following     ADMIN INFLUENZA (For MEDICARE Patients ONLY) []     FLU VACCINE, INCREASED ANTIGEN, PRESV FREE, AGE 65+ [[id number]]        Primary Care Provider Office Phone # Fax #    Junie Golden [phone] [address] [facility] [address]        Equal Access to Services     GT [place] : Hadii garret Crowley, waaxda moiz, qaybmckenna kaalmada rahel, colleen garcia. So [facility] [phone].    ATENCIÓN: Si habla español, tiene a ortega disposición servicios gratuitos de asistencia lingüística. Llame al [phone].    We comply with applicable federal civil rights laws and [place] laws. We do not discriminate on the basis of race, color, national origin, age, disability, sex, sexual orientation, or gender identity.            Thank you!     Thank you for choosing [facility]  for your care. Our goal is always to provide you with excellent care. Hearing back from our patients is one way we can continue to improve our services. Please take a few minutes to complete the written survey that you may receive in the mail after your visit with us. Thank you!             Your Updated Medication List - Protect others around you: Learn how to safely use, store and throw away your medicines at www.disposemymeds.org.          This list is accurate as of 9/26/18  9:36 AM.  Always use your most recent med list.                   Brand Name Dispense Instructions for use Diagnosis    ALEVE 220 MG capsule   Generic drug:  naproxen sodium      Take 220 mg by mouth 2 times daily (with meals).        DAILY MULTIVITAMIN PO      1 tab daily.        fexofenadine 180 MG tablet    ALLEGRA    30 tablet    Take 1 tablet (180  mg) by mouth daily    Post-nasal drip       fluticasone 50 MCG/ACT spray    FLONASE    1 Bottle    Spray 1-2 sprays into both nostrils daily    Post-nasal drip       MAGNESIUM PO      1 tablet daily        metoprolol succinate 50 MG 24 hr tablet    TOPROL-XL    90 tablet    Take 1 tablet (50 mg) by mouth daily    Essential hypertension with goal blood pressure less than 140/90       omeprazole 20 MG CR capsule    priLOSEC    180 capsule    Take 1 capsule (20 mg) by mouth 2 times daily    Epigastric pain

## 2018-10-04 NOTE — PROGRESS NOTES
SUBJECTIVE:   Rian Ness is a 72 year old male who presents for Preventive Visit.      Are you in the first 12 months of your Medicare Part B coverage?  No    Healthy Habits:    Do you get at least three servings of calcium containing foods daily (dairy, green leafy vegetables, etc.)? NO, depends on the day    Amount of exercise or daily activities, outside of work: working in the garage    Problems taking medications regularly No    Medication side effects: No    Have you had an eye exam in the past two years? yes    Do you see a dentist twice per year? yes    Do you have sleep apnea, excessive snoring or daytime drowsiness?Spouse reports snoring       Ability to successfully perform activities of daily living: Yes, no assistance needed    Home safety:  none identified     Hearing impairment: Yes, difficulty with hearing conversations farther away, difficulty with hearing conversations with sound in the back ground     Fall risk:  Fallen 2 or more times in the past year?: No  Any fall with injury in the past year?: No        COGNITIVE SCREEN  1) Repeat 3 items (Leader, Season, Table)    2) Clock draw: NORMAL  3) 3 item recall: Recalls 2 objects   Results: NORMAL clock, 1-2 items recalled: COGNITIVE IMPAIRMENT LESS LIKELY    Mini-CogTM Copyright S Sara. Licensed by the author for use in [facility]; reprinted with permission (phuong@[facility]). All rights reserved.            Doing well, daughter he had at age 17 and given for adoption has found him, good thing for him and his family    Reviewed and updated as needed this visit by clinical staff  Tobacco  Allergies  Meds  Problems  Med Hx  Surg Hx  Fam Hx  Soc Hx          Reviewed and updated as needed this visit by Provider  Allergies  Meds  Problems        Social History   Substance Use Topics     Smoking status: Former Smoker     Packs/day: 1.50     Years: 34.00     Types: Cigarettes     Quit date: 2/1/1984     Smokeless tobacco:  Never Used      Comment: Nonsmoking household     Alcohol use Yes      Comment: occ       If you drink alcohol do you typically have >3 drinks per day or >7 drinks per week? No                        Today's PHQ-2 Score:   PHQ-2 ( 1999 Pfizer) 10/9/2018 9/30/2017   Q1: Little interest or pleasure in doing things 0 0   Q2: Feeling down, depressed or hopeless 1 0   PHQ-2 Score 1 0   Q1: Little interest or pleasure in doing things - Not at all   Q2: Feeling down, depressed or hopeless - Not at all   PHQ-2 Score - 0       Do you feel safe in your environment - Yes    Do you have a Health Care Directive?: No: Advance care planning was reviewed with patient; patient declined at this time.        Family history of prostate cancer: Yes see [facility]  Last PSA:   PSA   Date Value Ref Range Status   11/15/2017 0.04 0 - 4 ug/L Final     Comment:     Assay Method:  Chemiluminescence using Siemens Vista analyzer     Doing self testicular exam: NO     Family history of colon cancer:No  Last colonscopy: 2009     Family history of CAD:No  Last Cholesterol:   Lab Results   Component Value Date    CHOL 165 11/15/2017     Lab Results   Component Value Date    HDL 46 11/15/2017     Lab Results   Component Value Date    LDL 93 11/15/2017     Lab Results   Component Value Date    TRIG 130 11/15/2017     Lab Results   Component Value Date    CHOLHDLRATIO 4.9 11/26/2012          Immunizations:    Date last DT tdap 2012,  Date last Pneumovax completed,   Date last Flu 9/26/18  Shingrix:recommended      Seat Belt:YES   Sunscreen use: YES  Calcium Intake: adeq  Health Care Directive:NO   Sexually Active:YES      Current contraception: none     Current Concerns: none          Patient Ed:  Reviewed health maintenance including diet, regular exercise, and periodic exams.     The risks, benefits, and treatment options of prescribed medications or other treatments have been discussed with the patient.  The patient should call or schedule a follow up  appt if no improvement or other problems.   Current providers sharing in care for this patient include:   Patient Care Team:  Junie Golden MD as PCP - General (Family Practice)    The following health maintenance items are reviewed in Epic and correct as of today:  Health Maintenance   Topic Date Due     MEDICARE ANNUAL WELLNESS VISIT  10/03/2018     FALL RISK ASSESSMENT  10/03/2018     PSA Q1 YR  11/15/2018     EYE EXAM Q1 YEAR  12/12/2018     BMP Q1 YR  02/26/2019     COLON CANCER SCREEN (SYSTEM ASSIGNED)  04/15/2019     PHQ-2 Q1 YR  10/09/2019     ADVANCE DIRECTIVE PLANNING Q5 YRS  11/14/2021     LIPID SCREEN Q5 YR MALE (SYSTEM ASSIGNED)  11/15/2022     TETANUS IMMUNIZATION (SYSTEM ASSIGNED)  12/03/2022     PNEUMOCOCCAL  Completed     INFLUENZA VACCINE  Completed     AORTIC ANEURYSM SCREENING (SYSTEM ASSIGNED)  Completed     Labs reviewed in EPIC  BP Readings from Last 3 Encounters:   10/09/18 124/74   04/16/18 119/69   02/26/18 112/68    Wt Readings from Last 3 Encounters:   10/09/18 209 lb 9.6 oz (95.1 kg)   04/16/18 200 lb (90.7 kg)   02/26/18 207 lb (93.9 kg)                  Patient Active Problem List   Diagnosis     Testicular hypofunction     Osteoarthritis, knee     Malignant neoplasm of prostate (H)     CARDIOVASCULAR SCREENING; LDL GOAL LESS THAN 130     Advanced directives, counseling/discussion     Status post total knee replacement     AC (acromioclavicular) joint arthritis     Biceps tendonitis     Pseudophakia,ou     Hypertension goal BP (blood pressure) < 140/90     Gastroesophageal reflux disease without esophagitis     Posterior vitreous detachment, bilateral     S/P complete repair of rotator cuff     Past Surgical History:   Procedure Laterality Date     ABDOMEN SURGERY       ARTHROSCOPY KNEE RT/LT  2000    right     ARTHROSCOPY KNEE RT/LT  1998    left     C TOTAL KNEE ARTHROPLASTY  10/11    right knee     COLONOSCOPY       PHACOEMULSIFICATION WITH STANDARD INTRAOCULAR LENS IMPLANT   "1/2010; 2/2010    left eye; right eye     SUPRAPUBIC PROSTATECTOMY  2/11     VASCULAR SURGERY         Social History   Substance Use Topics     Smoking status: Former Smoker     Packs/day: 1.50     Years: 34.00     Types: Cigarettes     Quit date: 2/1/1984     Smokeless tobacco: Never Used      Comment: Nonsmoking household     Alcohol use Yes      Comment: occ     Family History   Problem Relation Age of Onset     Cancer Mother      leukemia     Depression Other      Depression Other      Prostate Cancer Brother      Prostate Cancer Brother          Current Outpatient Prescriptions   Medication Sig Dispense Refill     DAILY MULTIVITAMIN PO 1 tab daily.        loratadine (CLARITIN) 10 MG tablet Take 10 mg by mouth daily       MAGNESIUM OR 1 tablet daily       metoprolol succinate (TOPROL-XL) 50 MG 24 hr tablet Take 1 tablet (50 mg) by mouth daily 90 tablet 1     naproxen sodium (ALEVE) 220 MG capsule Take 220 mg by mouth 2 times daily (with meals).       omeprazole (PRILOSEC) 20 MG CR capsule Take 1 capsule (20 mg) by mouth 2 times daily 180 capsule 3     [DISCONTINUED] metoprolol succinate (TOPROL-XL) 50 MG 24 hr tablet Take 1 tablet (50 mg) by mouth daily 90 tablet 1           ROS:  Constitutional, HEENT, cardiovascular, pulmonary, GI, , musculoskeletal, neuro, skin, endocrine and psych systems are negative, except as otherwise noted.    OBJECTIVE:   /74  Pulse 70  Temp 96.8  F (36  C) (Oral)  Resp 18  Ht 5' 6\" (1.676 m)  Wt 209 lb 9.6 oz (95.1 kg)  SpO2 96%  BMI 33.83 kg/m2 Estimated body mass index is 33.83 kg/(m^2) as calculated from the following:    Height as of this encounter: 5' 6\" (1.676 m).    Weight as of this encounter: 209 lb 9.6 oz (95.1 kg).  EXAM:   GENERAL: healthy, alert and no distress  EYES: Eyes grossly normal to inspection, PERRL and conjunctivae and sclerae normal  HENT: ear canals and TM's normal, nose and mouth without ulcers or lesions  NECK: no adenopathy, no asymmetry, " "masses, or scars and thyroid normal to palpation  RESP: lungs clear to auscultation - no rales, rhonchi or wheezes  CV: regular rate and rhythm, normal S1 S2, no S3 or S4, no murmur, click or rub, no peripheral edema and peripheral pulses strong  ABDOMEN: soft, nontender, no hepatosplenomegaly, no masses and bowel sounds normal  MS: no gross musculoskeletal defects noted, no edema  SKIN: no suspicious lesions or rashes  NEURO: Normal strength and tone, mentation intact and speech normal  PSYCH: mentation appears normal, affect normal/bright    Diagnostic Test Results:  none     ASSESSMENT / PLAN:   (Z00.00) Medicare annual wellness visit, subsequent  (primary encounter diagnosis)  Comment: preventive needs reviewed  Plan: see orders in Epic.     (I10) Essential hypertension with goal blood pressure less than 140/90  Comment: to goal  Plan: metoprolol succinate (TOPROL-XL) 50 MG 24 hr         tablet         Refill x 6 months f/u 6 months for OV and labs     (C61) Malignant neoplasm of prostate (H)  Comment: overdue for urology f/u, no symptoms  Plan: UROLOGY ADULT REFERRAL, CANCELED: PSA, total         and free        Refer to urology      End of Life Planning:  Patient currently has an advanced directive: No.  I have verified the patient's ablity to prepare an advanced directive/make health care decisions.  Literature was provided to assist patient in preparing an advanced directive.    COUNSELING:  Reviewed preventive health counseling, as reflected in patient instructions  Special attention given to:       Regular exercise       Healthy diet/nutrition    BP Readings from Last 1 Encounters:   10/09/18 124/74     Estimated body mass index is 33.83 kg/(m^2) as calculated from the following:    Height as of this encounter: 5' 6\" (1.676 m).    Weight as of this encounter: 209 lb 9.6 oz (95.1 kg).      Weight management plan: Discussed healthy diet and exercise guidelines and patient will follow up in 6 months in clinic " to re-evaluate.     reports that he quit smoking about 34 years ago. His smoking use included Cigarettes. He has a 51.00 pack-year smoking history. He has never used smokeless tobacco.      Appropriate preventive services were discussed with this patient, including applicable screening as appropriate for cardiovascular disease, diabetes, osteopenia/osteoporosis, and glaucoma.  As appropriate for age/gender, discussed screening for colorectal cancer, prostate cancer, breast cancer, and cervical cancer. Checklist reviewing preventive services available has been given to the patient.    Reviewed patients plan of care and provided an AVS. The Basic Care Plan (routine screening as documented in Health Maintenance) for Rian meets the Care Plan requirement. This Care Plan has been established and reviewed with the Patient.    Counseling Resources:  ATP IV Guidelines  Pooled Cohorts Equation Calculator  Breast Cancer Risk Calculator  FRAX Risk Assessment  ICSI Preventive Guidelines  Dietary Guidelines for Americans, 2010  USDA's MyPlate  ASA Prophylaxis  Lung CA Screening    Junie Golden MD  [facility]

## 2018-10-04 NOTE — PATIENT INSTRUCTIONS
Make an appointment with a urologist.      Preventive Health Recommendations:       Male Ages 65 and over    Yearly exam:             See your health care provider every year in order to  o   Review health changes.   o   Discuss preventive care.    o   Review your medicines if your doctor has prescribed any.    Talk with your health care provider about whether you should have a test to screen for prostate cancer (PSA).    Every 3 years, have a diabetes test (fasting glucose). If you are at risk for diabetes, you should have this test more often.    Every 5 years, have a cholesterol test. Have this test more often if you are at risk for high cholesterol or heart disease.     Every 10 years, have a colonoscopy. Or, have a yearly FIT test (stool test). These exams will check for colon cancer.    Talk to with your health care provider about screening for Abdominal Aortic Aneurysm if you have a family history of AAA or have a history of smoking.  Shots:     Get a flu shot each year.     Get a tetanus shot every 10 years.     Talk to your doctor about your pneumonia vaccines. There are now two you should receive - Pneumovax (PPSV 23) and Prevnar (PCV 13).    Talk to your pharmacist about a shingles vaccine.     Talk to your doctor about the hepatitis B vaccine.  Nutrition:     Eat at least 5 servings of fruits and vegetables each day.     Eat whole-grain bread, whole-wheat pasta and brown rice instead of white grains and rice.     Get adequate Calcium and Vitamin D.   Lifestyle    Exercise for at least 150 minutes a week (30 minutes a day, 5 days a week). This will help you control your weight and prevent disease.     Limit alcohol to one drink per day.     No smoking.     Wear sunscreen to prevent skin cancer.     See your dentist every six months for an exam and cleaning.     See your eye doctor every 1 to 2 years to screen for conditions such as glaucoma, macular degeneration and cataracts.

## 2018-10-09 ENCOUNTER — OFFICE VISIT (OUTPATIENT)
Dept: FAMILY MEDICINE | Facility: CLINIC | Age: 73
End: 2018-10-09
Payer: MEDICARE

## 2018-10-09 VITALS
RESPIRATION RATE: 18 BRPM | OXYGEN SATURATION: 96 % | WEIGHT: 209.6 LBS | SYSTOLIC BLOOD PRESSURE: 124 MMHG | HEIGHT: 66 IN | TEMPERATURE: 96.8 F | BODY MASS INDEX: 33.68 KG/M2 | DIASTOLIC BLOOD PRESSURE: 74 MMHG | HEART RATE: 70 BPM

## 2018-10-09 DIAGNOSIS — C61 MALIGNANT NEOPLASM OF PROSTATE (H): ICD-10-CM

## 2018-10-09 DIAGNOSIS — I10 ESSENTIAL HYPERTENSION WITH GOAL BLOOD PRESSURE LESS THAN 140/90: ICD-10-CM

## 2018-10-09 DIAGNOSIS — Z00.00 MEDICARE ANNUAL WELLNESS VISIT, SUBSEQUENT: Primary | ICD-10-CM

## 2018-10-09 PROCEDURE — G0439 PPPS, SUBSEQ VISIT: HCPCS | Performed by: FAMILY MEDICINE

## 2018-10-09 RX ORDER — METOPROLOL SUCCINATE 50 MG/1
50 TABLET, EXTENDED RELEASE ORAL DAILY
Qty: 90 TABLET | Refills: 1 | Status: SHIPPED | OUTPATIENT
Start: 2018-10-09 | End: 2019-03-19

## 2018-10-09 RX ORDER — LORATADINE 10 MG/1
10 TABLET ORAL DAILY
COMMUNITY
End: 2019-03-19

## 2018-10-09 NOTE — NURSING NOTE
"Chief Complaint   Patient presents with     Wellness Visit       Initial /83  Pulse 70  Temp 96.8  F (36  C) (Oral)  Resp 18  Ht 5' 6\" (1.676 m)  Wt 209 lb 9.6 oz (95.1 kg)  SpO2 96%  BMI 33.83 kg/m2 Estimated body mass index is 33.83 kg/(m^2) as calculated from the following:    Height as of this encounter: 5' 6\" (1.676 m).    Weight as of this encounter: 209 lb 9.6 oz (95.1 kg).  Medication Reconciliation: complete  Mat Obrien CMA      "

## 2018-10-09 NOTE — MR AVS SNAPSHOT
After Visit Summary   10/9/2018    Rian Ness    MRN: [id number]           Patient Information     Date Of Birth          1945        Visit Information        Provider Department      10/9/2018 8:55 AM Junie Golden MD [facility]        Today's Diagnoses     Medicare annual wellness visit, subsequent    -  1    Essential hypertension with goal blood pressure less than 140/90        Malignant neoplasm of prostate (H)          Care Instructions    Make an appointment with a urologist.      Preventive Health Recommendations:       Male Ages 65 and over    Yearly exam:             See your health care provider every year in order to  o   Review health changes.   o   Discuss preventive care.    o   Review your medicines if your doctor has prescribed any.    Talk with your health care provider about whether you should have a test to screen for prostate cancer (PSA).    Every 3 years, have a diabetes test (fasting glucose). If you are at risk for diabetes, you should have this test more often.    Every 5 years, have a cholesterol test. Have this test more often if you are at risk for high cholesterol or heart disease.     Every 10 years, have a colonoscopy. Or, have a yearly FIT test (stool test). These exams will check for colon cancer.    Talk to with your health care provider about screening for Abdominal Aortic Aneurysm if you have a family history of AAA or have a history of smoking.  Shots:     Get a flu shot each year.     Get a tetanus shot every 10 years.     Talk to your doctor about your pneumonia vaccines. There are now two you should receive - Pneumovax (PPSV 23) and Prevnar (PCV 13).    Talk to your pharmacist about a shingles vaccine.     Talk to your doctor about the hepatitis B vaccine.  Nutrition:     Eat at least 5 servings of fruits and vegetables each day.     Eat whole-grain bread, whole-wheat pasta and brown rice instead of white grains and rice.      Get adequate Calcium and Vitamin D.   Lifestyle    Exercise for at least 150 minutes a week (30 minutes a day, 5 days a week). This will help you control your weight and prevent disease.     Limit alcohol to one drink per day.     No smoking.     Wear sunscreen to prevent skin cancer.     See your dentist every six months for an exam and cleaning.     See your eye doctor every 1 to 2 years to screen for conditions such as glaucoma, macular degeneration and cataracts.          Follow-ups after your visit        Additional Services     UROLOGY ADULT REFERRAL       Your provider has referred you to: [facility]: [facility] [place] [phone]   [email or web address]    Please be aware that coverage of these services is subject to the terms and limitations of your health insurance plan.  Call member services at your health plan with any benefit or coverage questions.      Please bring the following with you to your appointment:    (1) Any X-Rays, CTs or MRIs which have been performed.  Contact the facility where they were done to arrange for  prior to your scheduled appointment.    (2) List of current medications  (3) This referral request   (4) Any documents/labs given to you for this referral                  Follow-up notes from your care team     Return in about 6 months (around 4/9/2019) for Routine Visit, BP Recheck.      Your next 10 appointments already scheduled     Dec 10, 2018  9:00 AM CST   New Visit with Jared Abebe MD   [facility] [place] ([facility] [place])    [address]  [place] [address] [phone]              Who to contact     If you have questions or need follow up information about today's clinic visit or your schedule please contact [facility] directly at [phone].  Normal or non-critical lab and imaging results will be communicated to you by MyChart, letter or phone within 4 business  "days after the clinic has received the results. If you do not hear from us within 7 days, please contact the clinic through [employer] or phone. If you have a critical or abnormal lab result, we will notify you by phone as soon as possible.  Submit refill requests through [employer] or call your pharmacy and they will forward the refill request to us. Please allow 3 business days for your refill to be completed.          Additional Information About Your Visit        Meiyou Information     [employer] gives you secure access to your electronic health record. If you see a primary care provider, you can also send messages to your care team and make appointments. If you have questions, please call your primary care clinic.  If you do not have a primary care provider, please call [phone] and they will assist you.        Care EveryWhere ID     This is your Care EveryWhere ID. This could be used by other organizations to access your [place] medical records  [id number]        Your Vitals Were     Pulse Temperature Respirations Height Pulse Oximetry BMI (Body Mass Index)    70 96.8  F (36  C) (Oral) 18 5' 6\" (1.676 m) 96% 33.83 kg/m2       Blood Pressure from Last 3 Encounters:   10/09/18 124/74   04/16/18 119/69   02/26/18 112/68    Weight from Last 3 Encounters:   10/09/18 209 lb 9.6 oz (95.1 kg)   04/16/18 200 lb (90.7 kg)   02/26/18 207 lb (93.9 kg)              We Performed the Following     PSA, total and free     UROLOGY ADULT REFERRAL          Where to get your medicines      These medications were sent to [facility] - [place], [place] - [address]  [address], [facility] [address]     Phone:  [phone]     metoprolol succinate 50 MG 24 hr tablet          Primary Care Provider Office Phone # Fax #    Junie Golden [phone] [address] [facility] [address]        Equal Access to Services     GT [place] AH: Adam Crowley, aida " dania rockwellrobeloina alexandercolleen mojica. So [facility] [phone].    ATENCIÓN: Si emil hector, tiene a ortega disposición servicios gratuitos de asistencia lingüística. Beni al [phone].    We comply with applicable federal civil rights laws and [place] laws. We do not discriminate on the basis of race, color, national origin, age, disability, sex, sexual orientation, or gender identity.            Thank you!     Thank you for choosing [facility]  [facility] your [facility]. Our goal is always to provide you with excellent care. Hearing back from our patients is one way we can continue to improve our services. Please take a few minutes to complete the written survey that you may receive in the mail after your visit with us. Thank you!             Your Updated Medication List - Protect others around you: Learn how to safely use, store and throw away your medicines at www.disposemymeds.org.          This list is accurate as of 10/9/18  9:26 AM.  Always use your most recent med list.                   Brand Name Dispense Instructions for use Diagnosis    ALEVE 220 MG capsule   Generic drug:  naproxen sodium      Take 220 mg by mouth 2 times daily (with meals).        DAILY MULTIVITAMIN PO      1 tab daily.        loratadine 10 MG tablet    CLARITIN     Take 10 mg by mouth daily        MAGNESIUM PO      1 tablet daily        metoprolol succinate 50 MG 24 hr tablet    TOPROL-XL    90 tablet    Take 1 tablet (50 mg) by mouth daily    Essential hypertension with goal blood pressure less than 140/90       omeprazole 20 MG CR capsule    priLOSEC    180 capsule    Take 1 capsule (20 mg) by mouth 2 times daily    Epigastric pain

## 2018-12-04 ENCOUNTER — OFFICE VISIT (OUTPATIENT)
Dept: UROLOGY | Facility: CLINIC | Age: 73
End: 2018-12-04
Payer: MEDICARE

## 2018-12-04 VITALS — RESPIRATION RATE: 16 BRPM | SYSTOLIC BLOOD PRESSURE: 136 MMHG | DIASTOLIC BLOOD PRESSURE: 78 MMHG | HEART RATE: 70 BPM

## 2018-12-04 DIAGNOSIS — C61 MALIGNANT NEOPLASM OF PROSTATE (H): Primary | ICD-10-CM

## 2018-12-04 DIAGNOSIS — N48.89 PENILE IRRITATION: ICD-10-CM

## 2018-12-04 LAB
ALBUMIN UR-MCNC: NEGATIVE MG/DL
APPEARANCE UR: CLEAR
BILIRUB UR QL STRIP: NEGATIVE
COLOR UR AUTO: YELLOW
GLUCOSE UR STRIP-MCNC: NEGATIVE MG/DL
HGB UR QL STRIP: NEGATIVE
KETONES UR STRIP-MCNC: NEGATIVE MG/DL
LEUKOCYTE ESTERASE UR QL STRIP: NEGATIVE
NITRATE UR QL: NEGATIVE
PH UR STRIP: 8 PH (ref 5–7)
PSA SERPL-MCNC: 0.06 UG/L (ref 0–4)
SOURCE: ABNORMAL
SP GR UR STRIP: 1.01 (ref 1–1.03)
UROBILINOGEN UR STRIP-ACNC: 0.2 EU/DL (ref 0.2–1)

## 2018-12-04 PROCEDURE — 99213 OFFICE O/P EST LOW 20 MIN: CPT | Performed by: UROLOGY

## 2018-12-04 PROCEDURE — 36415 COLL VENOUS BLD VENIPUNCTURE: CPT | Performed by: UROLOGY

## 2018-12-04 PROCEDURE — 84153 ASSAY OF PSA TOTAL: CPT | Performed by: UROLOGY

## 2018-12-04 PROCEDURE — 81003 URINALYSIS AUTO W/O SCOPE: CPT | Performed by: UROLOGY

## 2018-12-04 NOTE — MR AVS SNAPSHOT
After Visit Summary   12/4/2018    Rian Ness    MRN: [id number]           Patient Information     Date Of Birth          1945        Visit Information        Provider Department      12/4/2018 2:00 PM Ac Lee MD [facility]        Today's Diagnoses     Malignant neoplasm of prostate (H)    -  1    Penile irritation           Follow-ups after your visit        Your next 10 appointments already scheduled     Dec 10, 2018  9:00 AM CST   New Visit with Jared Abebe MD   [facility] ([facility]    [address] [address] [phone]              Future tests that were ordered for you today     Open Future Orders        Priority Expected Expires Ordered    PSA tumor marker Routine 12/5/2019 12/5/2019 12/4/2018            Who to contact     If you have questions or need follow up information about today's clinic visit or your schedule please contact [facility] directly at [phone].  Normal or non-critical lab and imaging results will be communicated to you by [employer], letter or phone within 4 business days after the clinic has received the results. If you do not hear from us within 7 days, please contact the clinic through NCRt or phone. If you have a critical or abnormal lab result, we will notify you by phone as soon as possible.  Submit refill requests through [employer] or call your pharmacy and they will forward the refill request to us. Please allow 3 business days for your refill to be completed.          Additional Information About Your Visit        [employer] Information     [employer] gives you secure access to your electronic health record. If you see a primary care provider, you can also send messages to your care team and make appointments. If you have questions, please call your primary care clinic.  If you do not have a primary care provider, please call [phone] and they will assist  you.        Care EveryWhere ID     This is your Care EveryWhere ID. This could be used by other organizations to access your [place] medical records  [id number]        Your Vitals Were     Pulse Respirations                70 16           Blood Pressure from Last 3 Encounters:   12/04/18 136/78   10/09/18 124/74   04/16/18 119/69    Weight from Last 3 Encounters:   10/09/18 95.1 kg (209 lb 9.6 oz)   04/16/18 90.7 kg (200 lb)   02/26/18 93.9 kg (207 lb)              We Performed the Following     PSA tumor marker     UA reflex to Microscopic and Culture [[id number]]        Primary Care Provider Office Phone # Fax #    Junie Golden [phone] [address] [facility] [address]        Equal Access to Services     [facility] : Hadii garret hong hadasho Soomaali, waaxda luqadaha, qaybta kaalmada adeegyaeloina, colleen parker . So [facility] [phone].    ATENCIÓN: Si habla español, tiene a ortega disposición servicios gratuitos de asistencia lingüística. Beni al [phone].    We comply with applicable federal civil rights laws and [place] laws. We do not discriminate on the basis of race, color, national origin, age, disability, sex, sexual orientation, or gender identity.            Thank you!     Thank you for choosing [facility]  for your care. Our goal is always to provide you with excellent care. Hearing back from our patients is one way we can continue to improve our services. Please take a few minutes to complete the written survey that you may receive in the mail after your visit with us. Thank you!             Your Updated Medication List - Protect others around you: Learn how to safely use, store and throw away your medicines at www.disposemymeds.org.          This list is accurate as of 12/4/18  2:25 PM.  Always use your most recent med list.                   Brand Name Dispense Instructions for use Diagnosis    ALEVE 220 MG capsule   Generic  drug:  naproxen sodium      Take 220 mg by mouth 2 times daily (with meals).        DAILY MULTIVITAMIN PO      1 tab daily.        loratadine 10 MG tablet    CLARITIN     Take 10 mg by mouth daily        MAGNESIUM PO      1 tablet daily        metoprolol succinate ER 50 MG 24 hr tablet    TOPROL-XL    90 tablet    Take 1 tablet (50 mg) by mouth daily    Essential hypertension with goal blood pressure less than 140/90       omeprazole 20 MG DR capsule    priLOSEC    180 capsule    Take 1 capsule (20 mg) by mouth 2 times daily    Epigastric pain

## 2018-12-04 NOTE — PROGRESS NOTES
Chief Complaint   Patient presents with     Consult     psa       Rian Ness is a 73 year old male who presents today for follow up of   Chief Complaint   Patient presents with     Consult     psa    patient with h/o prostate cancer s/p RRP in 2011.  He has no urinary issues.  He has some penile irritation recently but it is getting better.     Current Outpatient Prescriptions   Medication Sig Dispense Refill     DAILY MULTIVITAMIN PO 1 tab daily.        loratadine (CLARITIN) 10 MG tablet Take 10 mg by mouth daily       MAGNESIUM OR 1 tablet daily       metoprolol succinate (TOPROL-XL) 50 MG 24 hr tablet Take 1 tablet (50 mg) by mouth daily 90 tablet 1     naproxen sodium (ALEVE) 220 MG capsule Take 220 mg by mouth 2 times daily (with meals).       omeprazole (PRILOSEC) 20 MG CR capsule Take 1 capsule (20 mg) by mouth 2 times daily 180 capsule 3     No Known Allergies   Past Medical History:   Diagnosis Date     Arthritis      Cancer (H)      Depressive disorder      HTN      Past Surgical History:   Procedure Laterality Date     ABDOMEN SURGERY       ARTHROSCOPY KNEE RT/LT  2000    right     ARTHROSCOPY KNEE RT/LT  1998    left     C TOTAL KNEE ARTHROPLASTY  10/11    right knee     COLONOSCOPY       PHACOEMULSIFICATION WITH STANDARD INTRAOCULAR LENS IMPLANT  1/2010; 2/2010    left eye; right eye     SUPRAPUBIC PROSTATECTOMY  2/11     VASCULAR SURGERY       Family History   Problem Relation Age of Onset     Cancer Mother      leukemia     Depression Other      Depression Other      Prostate Cancer Brother      Prostate Cancer Brother      Social History     Social History     Marital status:      Spouse name: N/A     Number of children: N/A     Years of education: N/A     Social History Main Topics     Smoking status: Former Smoker     Packs/day: 1.50     Years: 34.00     Types: Cigarettes     Quit date: 2/1/1984     Smokeless tobacco: Never Used      Comment: Nonsmoking household     Alcohol use Yes       Comment: occ     Drug use: No     Sexual activity: No     Other Topics Concern     Parent/Sibling W/ Cabg, Mi Or Angioplasty Before 65f 55m? No     Social History Narrative       REVIEW OF SYSTEMS  =================  C: NEGATIVE for fever, chills, change in weight  I: NEGATIVE for worrisome rashes, moles or lesions  E/M: NEGATIVE for ear, mouth and throat problems  R: NEGATIVE for significant cough or SHORTNESS OF BREATH,   CV: NEGATIVE for chest pain, palpitations or peripheral edema  GI: NEGATIVE for nausea, abdominal pain, heartburn, or change in bowel habits  NEURO: NEGATIVE any motor/sensory changes  PSYCH: NEGATIVE for recent mood disorder    Physical Exam:  /78 (BP Location: Right arm, Patient Position: Chair, Cuff Size: Adult Large)  Pulse 70  Resp 16   Patient is pleasant, in no acute distress, good general condition.  Lung: no evidence of respiratory distress    Abdomen: Soft, nondistended, non tender. No masses. No rebound or guarding.   Exam: penis with mild irritation at meatus.  Normal foreskin.  Testis no masses.  No scrotal skin lesion.  Skin: Warm and dry.  No redness.  Psych: normal mood and affect  Neuro: alert and oriented  Musculaskeletal: moving all extremities    Assessment/Plan:   (C61) Malignant neoplasm of prostate (H)  (primary encounter diagnosis)  Comment:    Plan: PSA tumor marker today             (N48.89) Penile irritation  Comment:    Plan: UA reflex to Microscopic and Culture [[id number]]

## 2018-12-10 ENCOUNTER — OFFICE VISIT (OUTPATIENT)
Dept: OPHTHALMOLOGY | Facility: CLINIC | Age: 73
End: 2018-12-10
Payer: MEDICARE

## 2018-12-10 DIAGNOSIS — H52.4 PRESBYOPIA: ICD-10-CM

## 2018-12-10 DIAGNOSIS — Z96.1 PSEUDOPHAKIA: Primary | ICD-10-CM

## 2018-12-10 DIAGNOSIS — H43.813 POSTERIOR VITREOUS DETACHMENT, BILATERAL: ICD-10-CM

## 2018-12-10 PROCEDURE — 92015 DETERMINE REFRACTIVE STATE: CPT | Mod: GY | Performed by: OPHTHALMOLOGY

## 2018-12-10 PROCEDURE — 92014 COMPRE OPH EXAM EST PT 1/>: CPT | Performed by: OPHTHALMOLOGY

## 2018-12-10 ASSESSMENT — TONOMETRY
IOP_METHOD: APPLANATION
OS_IOP_MMHG: 18
OD_IOP_MMHG: 16

## 2018-12-10 ASSESSMENT — REFRACTION_WEARINGRX
OD_ADD: +3.00
OD_AXIS: 177
OS_CYLINDER: +1.25
OD_SPHERE: -1.00
SPECS_TYPE: BIFOCAL
OS_SPHERE: -1.25
OS_ADD: +3.00
OS_AXIS: 170
OD_CYLINDER: +1.50

## 2018-12-10 ASSESSMENT — CUP TO DISC RATIO
OS_RATIO: 0.4
OD_RATIO: 0.2

## 2018-12-10 ASSESSMENT — VISUAL ACUITY
OS_CC: 20/25+2
OD_CC: 20/30+3
METHOD: SNELLEN - LINEAR
OS_CC: J1
CORRECTION_TYPE: GLASSES
OD_CC: J1

## 2018-12-10 ASSESSMENT — REFRACTION_MANIFEST
OS_CYLINDER: +1.50
OD_AXIS: 005
OD_CYLINDER: +1.00
OS_SPHERE: -1.00
OD_SPHERE: -0.50
OD_ADD: +3.00
OS_AXIS: 170
OS_ADD: +3.00

## 2018-12-10 ASSESSMENT — CONF VISUAL FIELD
OS_NORMAL: 1
OD_NORMAL: 1

## 2018-12-10 ASSESSMENT — EXTERNAL EXAM - RIGHT EYE: OD_EXAM: 2+ BROW PTOSIS, PROLAPSED FAT PADS: UPPER, LOWER

## 2018-12-10 ASSESSMENT — EXTERNAL EXAM - LEFT EYE: OS_EXAM: 2+ BROW PTOSIS, PROLAPSED FAT PADS: UPPER, LOWER

## 2018-12-10 ASSESSMENT — SLIT LAMP EXAM - LIDS
COMMENTS: 2+ DERMATOCHALASIS - UPPER LID
COMMENTS: 2+ DERMATOCHALASIS - UPPER LID

## 2018-12-10 NOTE — LETTER
12/10/2018         RE: Rian Ness  [address] [address]        Dear Colleague,    Thank you for referring your patient, Rian Ness, to the [facility]. Please see a copy of my visit note below.     Current Eye Medications:  Tears prn     Subjective:  Comprehensive eye exam.   Patient reports is seeing well, vision seems unchanged and states eyes seem otherwise fine.      Objective:  See Ophthalmology Exam.       Assessment:  Stable eye exam.      ICD-10-CM    1. Pseudophakia,ou Z96.1 EYE EXAM (SIMPLE-NONBILLABLE)   2. Posterior vitreous detachment, bilateral H43.813    3. Presbyopia H52.4 REFRACTIVE STATUS        Plan:  Possible clouding of posterior capsule discussed.   Use artificial tears up to 4 times daily both eyes. (Refresh Tears, Systane Ultra/Balance, or Theratears)   Glasses Rx given - optional   Call in August 2019 for an appointment in December 2019 for Complete Exam.    Dr. Abebe [phone]         Again, thank you for allowing me to participate in the care of your patient.        Sincerely,        Jared Abebe MD

## 2018-12-10 NOTE — PATIENT INSTRUCTIONS
Possible clouding of posterior capsule discussed.   Use artificial tears up to 4 times daily both eyes. (Refresh Tears, Systane Ultra/Balance, or Theratears)   Glasses Rx given - optional   Call in August 2019 for an appointment in December 2019 for Complete Exam.    Dr. Abebe [phone]

## 2018-12-10 NOTE — PROGRESS NOTES
Current Eye Medications:  Tears prn     Subjective:  Comprehensive eye exam.   Patient reports is seeing well, vision seems unchanged and states eyes seem otherwise fine.      Objective:  See Ophthalmology Exam.       Assessment:  Stable eye exam.      ICD-10-CM    1. Pseudophakia,ou Z96.1 EYE EXAM (SIMPLE-NONBILLABLE)   2. Posterior vitreous detachment, bilateral H43.813    3. Presbyopia H52.4 REFRACTIVE STATUS        Plan:  Possible clouding of posterior capsule discussed.   Use artificial tears up to 4 times daily both eyes. (Refresh Tears, Systane Ultra/Balance, or Theratears)   Glasses Rx given - optional   Call in August 2019 for an appointment in December 2019 for Complete Exam.    Dr. Abebe [phone]

## 2019-01-22 ENCOUNTER — TELEPHONE (OUTPATIENT)
Dept: FAMILY MEDICINE | Facility: CLINIC | Age: 74
End: 2019-01-22

## 2019-01-22 NOTE — TELEPHONE ENCOUNTER
Patient has scheduled an appointment with [place] 02/05/19 via My Chart with visit notes including - cough, headache, tightness in chest, tired. Please advise.  SARAI Crouch

## 2019-01-22 NOTE — TELEPHONE ENCOUNTER
Spoke to patient report has symptoms of URI, x 1 mos.  Patient has hoarse voice.  Patient is speaking in full clear sentences.  Afebrile.  Advise evaluation.    Past Medical History:   Diagnosis Date     Arthritis      Cancer (H)      Depressive disorder      HTN      An appointment is scheduled for tomorrow with Dr NATHALIE Berry for evaluation.  Will keep appointment for 2/5/19 due to patient would like very much to see Dr Junie Golden, in event this is needed.  Patient/parent verbalized understanding of instructions provided and agreed with the plan of care    FYI to Dr Junie Golden.  Lluvia Corey RN

## 2019-01-23 ENCOUNTER — OFFICE VISIT (OUTPATIENT)
Dept: FAMILY MEDICINE | Facility: CLINIC | Age: 74
End: 2019-01-23
Payer: MEDICARE

## 2019-01-23 ENCOUNTER — ANCILLARY PROCEDURE (OUTPATIENT)
Dept: GENERAL RADIOLOGY | Facility: CLINIC | Age: 74
End: 2019-01-23
Payer: MEDICARE

## 2019-01-23 VITALS
TEMPERATURE: 97.6 F | BODY MASS INDEX: 32.62 KG/M2 | HEIGHT: 66 IN | SYSTOLIC BLOOD PRESSURE: 115 MMHG | WEIGHT: 203 LBS | RESPIRATION RATE: 14 BRPM | HEART RATE: 70 BPM | OXYGEN SATURATION: 98 % | DIASTOLIC BLOOD PRESSURE: 80 MMHG

## 2019-01-23 DIAGNOSIS — R05.9 COUGH: Primary | ICD-10-CM

## 2019-01-23 DIAGNOSIS — R05.9 COUGH: ICD-10-CM

## 2019-01-23 PROCEDURE — 99214 OFFICE O/P EST MOD 30 MIN: CPT | Performed by: FAMILY MEDICINE

## 2019-01-23 PROCEDURE — 71046 X-RAY EXAM CHEST 2 VIEWS: CPT

## 2019-01-23 RX ORDER — AZITHROMYCIN 250 MG/1
TABLET, FILM COATED ORAL
Qty: 6 TABLET | Refills: 0 | Status: SHIPPED | OUTPATIENT
Start: 2019-01-23 | End: 2019-02-05

## 2019-01-23 ASSESSMENT — MIFFLIN-ST. JEOR: SCORE: 1598.55

## 2019-01-23 NOTE — PROGRESS NOTES
"SUBJECTIVE:  73 year old.The patient has a complaint of cough.  This started 1 month ago. quality sometimes productive.  Associated symptoms are post nasal drip..  Brought on by when talking. ROS no fever or chills        Reviewed health maintenance  Patient Active Problem List   Diagnosis     Testicular hypofunction     Osteoarthritis, knee     Malignant neoplasm of prostate (H)     CARDIOVASCULAR SCREENING; LDL GOAL LESS THAN 130     Advanced directives, counseling/discussion     Status post total knee replacement     AC (acromioclavicular) joint arthritis     Biceps tendonitis     Pseudophakia,ou     Hypertension goal BP (blood pressure) < 140/90     Gastroesophageal reflux disease without esophagitis     Posterior vitreous detachment, bilateral     S/P complete repair of rotator cuff     Past Medical History:   Diagnosis Date     Arthritis      Cancer (H)      Depressive disorder      HTN        OBJECTIVE:  no apparent distress  /80   Pulse 70   Temp 97.6  F (36.4  C) (Oral)   Resp 14   Ht 1.676 m (5' 6\")   Wt 92.1 kg (203 lb)   SpO2 98%   BMI 32.77 kg/m    Skin cool  tms clear  Pharynx- clear  LUNGS:  CTA B/L, no wheezing or crackles.   Cardiovascular: negative, PMI normal. No lifts, heaves, or thrills. RRR. No murmurs, clicks gallops or rub   Gastrointestinal: Abdomen soft, non-tender. BS normal. No masses, organomegaly   chest x-ray clear    ICD-10-CM    1. Cough R05 XR Chest 2 Views     azithromycin (ZITHROMAX) 250 MG tablet    PLAN: Re check 2 weeks if not improved      "

## 2019-01-30 NOTE — PROGRESS NOTES
SUBJECTIVE:  Rian Ness is a 73 year old male who presents with the following concerns;              Symptoms: cc Present Absent Comment   Fever/Chills   x    Fatigue  x  On going, does not feel like doing anything due to fatigue    Muscle Aches  x  Across chest and ribs with cough, stiff muscles in the neck    Eye Irritation   x    Sneezing  x  Occasional    Nasal Abram/Drg  x     Sinus Pressure/Pain  x     Loss of smell  x     Dental pain   x    Sore Throat   x    Swollen Glands  x  Occasional    Ear Pain/Fullness  x  Hard to tell due to other ear difficulties    Cough  x  Dry, non-productive   Wheeze  x  Occasional    Chest Pain  x  With coughing   Shortness of breath  x  slight   Rash   x    Other  x  Headache- reported as sinus pressure      Symptom duration:  started 12/25/18.     Sympom severity:  improving cough most bothersome   Treatments tried:  Zpack, told to follow up in 2 weeks if not improved.  Feeling some improvement, but not better    Contacts:  wife also ill        Medications updated and reviewed.  Past, family and surgical history is updated and reviewed in the record.  ROS:  Other than noted above, general, HEENT, respiratory, cardiac and gastrointestinal systems are negative.  OBJECTIVE:  /78   Pulse 69   Temp 96.9  F (36.1  C) (Oral)   Resp 16   Wt 93.4 kg (206 lb)   SpO2 97%   BMI 33.25 kg/m      GENERAL: Pleasant and interactive.  Alert and oriented x 3.  Minimal distress.  HEENT: Normocephalic, atraumatic. PEERRLA, EOMI.  Scleras, lids and conjunctivae normal. Pinnas, canals and TM's clear.  Nose and oropharynx moist and clear.  NECK: supple and free of adenopathy or masses, the thyroid is normal without enlargement or nodules.  CHEST:  Clear, no wheezing or rales. Normal symmetric air entry throughout both lung fields. No chest wall deformities or tenderness.  HEART:  S1 and S2 normal, no murmurs, clicks, gallops or rubs. Regular rate and rhythm.  No edema or  JVD.  SKIN: well perfused without cyanosis or rashes.    \  Assessment:  (J40) Bronchitis  (primary encounter diagnosis)  Comment: persistent cough  Plan: benzonatate (TESSALON) 200 MG capsule,         predniSONE (DELTASONE) 20 MG tablet        Tessalon and prednisone for cough suppression, no new abx indicated.

## 2019-02-05 ENCOUNTER — OFFICE VISIT (OUTPATIENT)
Dept: FAMILY MEDICINE | Facility: CLINIC | Age: 74
End: 2019-02-05
Payer: MEDICARE

## 2019-02-05 VITALS
OXYGEN SATURATION: 97 % | SYSTOLIC BLOOD PRESSURE: 131 MMHG | HEART RATE: 69 BPM | TEMPERATURE: 96.9 F | RESPIRATION RATE: 16 BRPM | BODY MASS INDEX: 33.25 KG/M2 | WEIGHT: 206 LBS | DIASTOLIC BLOOD PRESSURE: 78 MMHG

## 2019-02-05 DIAGNOSIS — J40 BRONCHITIS: Primary | ICD-10-CM

## 2019-02-05 PROCEDURE — 99214 OFFICE O/P EST MOD 30 MIN: CPT | Performed by: FAMILY MEDICINE

## 2019-02-05 RX ORDER — PREDNISONE 20 MG/1
40 TABLET ORAL DAILY
Qty: 10 TABLET | Refills: 0 | Status: SHIPPED | OUTPATIENT
Start: 2019-02-05 | End: 2019-03-19

## 2019-02-05 RX ORDER — BENZONATATE 200 MG/1
200 CAPSULE ORAL 3 TIMES DAILY PRN
Qty: 30 CAPSULE | Refills: 0 | Status: SHIPPED | OUTPATIENT
Start: 2019-02-05 | End: 2019-03-19

## 2019-02-05 NOTE — NURSING NOTE
"Chief Complaint   Patient presents with     URI       Initial /78   Pulse 69   Temp 96.9  F (36.1  C) (Oral)   Resp 16   Wt 93.4 kg (206 lb)   SpO2 97%   BMI 33.25 kg/m   Estimated body mass index is 33.25 kg/m  as calculated from the following:    Height as of 1/23/19: 1.676 m (5' 6\").    Weight as of this encounter: 93.4 kg (206 lb).  Medication Reconciliation: complete  Mat Obrien CMA    "

## 2019-03-06 ENCOUNTER — DOCUMENTATION ONLY (OUTPATIENT)
Dept: OPHTHALMOLOGY | Facility: CLINIC | Age: 74
End: 2019-03-06

## 2019-03-12 NOTE — PROGRESS NOTES
"  SUBJECTIVE:   Rian Ness is a 73 year old male who presents to clinic today for the following health issues:      Hypertension Follow-up      Outpatient blood pressures {ISCHECKIN}    Low Salt Diet: { :094391::\"no added salt\"}    Hypertension ROS: {HTN CVS ROS:5727::\"taking medications as instructed\",\"no medication side effects noted\",\"no TIA's\",\"no chest pain on exertion\",\"no dyspnea on exertion\",\"no swelling of ankles\"}.  No headache or visual changes.        Amount of exercise or physical activity: {Exercise frequency days per week:227565}    Problems taking medications regularly: {Med Problems:266689::\"No\"}    Medication side effects: {CHRONIC MED SIDE EFFECTS:519570::\"none\"}    Diet: { :430976}        {additional problems for provider to add:728605}    Problem list and histories reviewed & adjusted, as indicated.  Additional history: {NONE - AS DOCUMENTED:959657::\"as documented\"}    {HIST REVIEW/ LINKS 2:547520}    Reviewed and updated as needed this visit by clinical staff       Reviewed and updated as needed this visit by Provider         {PROVIDER CHARTING PREFERENCE:782169}  "

## 2019-03-19 ENCOUNTER — OFFICE VISIT (OUTPATIENT)
Dept: FAMILY MEDICINE | Facility: CLINIC | Age: 74
End: 2019-03-19
Payer: MEDICARE

## 2019-03-19 VITALS
WEIGHT: 212 LBS | OXYGEN SATURATION: 97 % | BODY MASS INDEX: 34.22 KG/M2 | RESPIRATION RATE: 22 BRPM | TEMPERATURE: 97.6 F | HEART RATE: 72 BPM | SYSTOLIC BLOOD PRESSURE: 125 MMHG | DIASTOLIC BLOOD PRESSURE: 76 MMHG

## 2019-03-19 DIAGNOSIS — C61 MALIGNANT NEOPLASM OF PROSTATE (H): ICD-10-CM

## 2019-03-19 DIAGNOSIS — J30.2 SEASONAL ALLERGIC RHINITIS, UNSPECIFIED TRIGGER: ICD-10-CM

## 2019-03-19 DIAGNOSIS — I10 ESSENTIAL HYPERTENSION WITH GOAL BLOOD PRESSURE LESS THAN 140/90: Primary | ICD-10-CM

## 2019-03-19 LAB
ANION GAP SERPL CALCULATED.3IONS-SCNC: 4 MMOL/L (ref 3–14)
BUN SERPL-MCNC: 25 MG/DL (ref 7–30)
CALCIUM SERPL-MCNC: 9.1 MG/DL (ref 8.5–10.1)
CHLORIDE SERPL-SCNC: 105 MMOL/L (ref 94–109)
CO2 SERPL-SCNC: 32 MMOL/L (ref 20–32)
CREAT SERPL-MCNC: 1.03 MG/DL (ref 0.66–1.25)
GFR SERPL CREATININE-BSD FRML MDRD: 71 ML/MIN/{1.73_M2}
GLUCOSE SERPL-MCNC: 124 MG/DL (ref 70–99)
POTASSIUM SERPL-SCNC: 4.1 MMOL/L (ref 3.4–5.3)
SODIUM SERPL-SCNC: 141 MMOL/L (ref 133–144)

## 2019-03-19 PROCEDURE — 36415 COLL VENOUS BLD VENIPUNCTURE: CPT | Performed by: FAMILY MEDICINE

## 2019-03-19 PROCEDURE — 99214 OFFICE O/P EST MOD 30 MIN: CPT | Performed by: FAMILY MEDICINE

## 2019-03-19 PROCEDURE — 80048 BASIC METABOLIC PNL TOTAL CA: CPT | Performed by: FAMILY MEDICINE

## 2019-03-19 RX ORDER — LORATADINE 10 MG/1
10 TABLET ORAL DAILY
Qty: 90 TABLET | Refills: 3 | Status: SHIPPED | OUTPATIENT
Start: 2019-03-19 | End: 2020-03-17

## 2019-03-19 RX ORDER — METOPROLOL SUCCINATE 50 MG/1
50 TABLET, EXTENDED RELEASE ORAL DAILY
Qty: 90 TABLET | Refills: 1 | Status: SHIPPED | OUTPATIENT
Start: 2019-03-19 | End: 2019-05-20

## 2019-03-19 ASSESSMENT — PAIN SCALES - GENERAL: PAINLEVEL: NO PAIN (0)

## 2019-03-19 NOTE — NURSING NOTE
"Chief Complaint   Patient presents with     Hypertension       Initial /76   Pulse 72   Temp 97.6  F (36.4  C) (Oral)   Resp 22   Wt 96.2 kg (212 lb)   SpO2 97%   BMI 34.22 kg/m   Estimated body mass index is 34.22 kg/m  as calculated from the following:    Height as of 1/23/19: 1.676 m (5' 6\").    Weight as of this encounter: 96.2 kg (212 lb).  Medication Reconciliation: complete  Filomena Reinoso CMA  "

## 2019-03-19 NOTE — PROGRESS NOTES
SUBJECTIVE:   Rian Ness is a 73 year old male who presents to clinic today for the following health issues:  Hypertension Follow-up      Outpatient blood pressures are being checked at home.  Results are to goal.    Low Salt Diet: no added salt    History of Present Illness   Frequency of exercise:  2-3 days/week  Duration of exercise:  15-30 minutes  Taking medications regularly:  Yes  Medication side effects:  None  Additional concerns today:  No    Hypertension ROS: taking medications as instructed, no medication side effects noted, no TIA's, no chest pain on exertion, no dyspnea on exertion, no swelling of ankles.  No headache or visual changes.        Problem list and histories reviewed & adjusted, as indicated.  Additional history: as documented        Patient Active Problem List   Diagnosis     Testicular hypofunction     Osteoarthritis, knee     Malignant neoplasm of prostate (H)     CARDIOVASCULAR SCREENING; LDL GOAL LESS THAN 130     Advanced directives, counseling/discussion     Status post total knee replacement     AC (acromioclavicular) joint arthritis     Biceps tendonitis     Pseudophakia,ou     Hypertension goal BP (blood pressure) < 140/90     Gastroesophageal reflux disease without esophagitis     Posterior vitreous detachment, bilateral     S/P complete repair of rotator cuff     Past Surgical History:   Procedure Laterality Date     ABDOMEN SURGERY       ARTHROSCOPY KNEE RT/LT      right     ARTHROSCOPY KNEE RT/LT      left     C TOTAL KNEE ARTHROPLASTY  10/11    right knee     COLONOSCOPY       PHACOEMULSIFICATION WITH STANDARD INTRAOCULAR LENS IMPLANT  2010; 2010    left eye; right eye     SUPRAPUBIC PROSTATECTOMY       VASCULAR SURGERY         Social History     Tobacco Use     Smoking status: Former Smoker     Packs/day: 1.50     Years: 34.00     Pack years: 51.00     Types: Cigarettes     Last attempt to quit: 1984     Years since quittin.1     Smokeless  tobacco: Never Used     Tobacco comment: Nonsmoking household   Substance Use Topics     Alcohol use: Yes     Comment: very lightly     Family History   Problem Relation Age of Onset     Cancer Mother         leukemia     Depression Other      Depression Other      Prostate Cancer Brother      Prostate Cancer Brother      Prostate Cancer Brother          Current Outpatient Medications   Medication Sig Dispense Refill     DAILY MULTIVITAMIN PO 1 tab daily.        loratadine (CLARITIN) 10 MG tablet Take 1 tablet (10 mg) by mouth daily 90 tablet 3     MAGNESIUM OR 1 tablet daily       metoprolol succinate ER (TOPROL-XL) 50 MG 24 hr tablet Take 1 tablet (50 mg) by mouth daily 90 tablet 1     naproxen sodium (ALEVE) 220 MG capsule Take 220 mg by mouth 2 times daily (with meals).       omeprazole (PRILOSEC) 20 MG CR capsule Take 1 capsule (20 mg) by mouth 2 times daily 180 capsule 3     BP Readings from Last 3 Encounters:   03/19/19 125/76   02/05/19 131/78   01/23/19 115/80    Wt Readings from Last 3 Encounters:   03/19/19 96.2 kg (212 lb)   02/05/19 93.4 kg (206 lb)   01/23/19 92.1 kg (203 lb)                  Labs reviewed in EPIC    ROS:  Constitutional, HEENT, cardiovascular, pulmonary, gi and gu systems are negative, except as otherwise noted.  Gained 10# in past 3 months, always feels hungry, admits not very busy, may be bored.    OBJECTIVE:     /76   Pulse 72   Temp 97.6  F (36.4  C) (Oral)   Resp 22   Wt 96.2 kg (212 lb)   SpO2 97%   BMI 34.22 kg/m    Body mass index is 34.22 kg/m .  GENERAL: healthy, alert and no distress  EYES: Eyes grossly normal to inspection, PERRL and conjunctivae and sclerae normal  RESP: lungs clear to auscultation - no rales, rhonchi or wheezes  CV: regular rate and rhythm, normal S1 S2, no S3 or S4, no murmur, click or rub, no peripheral edema and peripheral pulses strong  MS: no gross musculoskeletal defects noted, no edema  SKIN: no suspicious lesions or rashes  PSYCH:  mentation appears normal, affect normal/bright    Diagnostic Test Results:  none     ASSESSMENT/PLAN:     (I10) Essential hypertension with goal blood pressure less than 140/90  (primary encounter diagnosis)  Comment: to goal  Plan: BASIC METABOLIC PANEL, metoprolol succinate ER         (TOPROL-XL) 50 MG 24 hr tablet         Refill x 6 months f/u 6 months for OV and labs wellness exam    (J30.2) Seasonal allergic rhinitis, unspecified trigger  Comment: needs claritin refilled  Plan: loratadine (CLARITIN) 10 MG tablet        Refill x 1 yr     (C61) Malignant neoplasm of prostate (H)  Comment: stable  Plan: released from urology, needs yearly PSA, HM updated    See Patient Instructions    Junie Golden MD  [facility]

## 2019-04-22 DIAGNOSIS — R10.13 EPIGASTRIC PAIN: ICD-10-CM

## 2019-04-22 DIAGNOSIS — I10 ESSENTIAL HYPERTENSION WITH GOAL BLOOD PRESSURE LESS THAN 140/90: ICD-10-CM

## 2019-04-23 RX ORDER — METOPROLOL SUCCINATE 50 MG/1
TABLET, EXTENDED RELEASE ORAL
Qty: 90 TABLET | Refills: 1 | OUTPATIENT
Start: 2019-04-23

## 2019-04-23 NOTE — TELEPHONE ENCOUNTER
Prescription approved per [facility] Refill Protocol for Omeprazole.  Denial sent for Metoprolol, has refills.  Lluvia Corey RN

## 2019-05-20 ENCOUNTER — TELEPHONE (OUTPATIENT)
Dept: FAMILY MEDICINE | Facility: CLINIC | Age: 74
End: 2019-05-20

## 2019-05-20 DIAGNOSIS — I10 ESSENTIAL HYPERTENSION WITH GOAL BLOOD PRESSURE LESS THAN 140/90: ICD-10-CM

## 2019-05-20 RX ORDER — METOPROLOL SUCCINATE 50 MG/1
50 TABLET, EXTENDED RELEASE ORAL DAILY
Qty: 90 TABLET | Refills: 0 | Status: SHIPPED | OUTPATIENT
Start: 2019-05-20 | End: 2019-07-05

## 2019-05-20 NOTE — TELEPHONE ENCOUNTER
Metoprolol was sent to local pharmacy 3/19/19 x 6 mos.    [id number] - [facility] [address] [place] [address] AT [address]  [address] [facility] [address]  Phone: [phone] Fax: [phone]    Left message on answering machine for patient/parent to call back.   [phone].  Lluvia Corey RN

## 2019-05-20 NOTE — TELEPHONE ENCOUNTER
From: Rian Ness   Sent: 5/17/2019  10:10 AM   To: Mercedes Davalos Feedback   Subject: PRESCRIPTION                                       ----- Message from Austyn Neofectsheila sent at 5/17/2019 10:10 AM CDT -----      Topic: General Compliment      I need  my METOPROLOL refilled. [facility] said it was refused . For the time being I have been using my wife's because I can't get a refill. What do I do now. Why was my prescription refused ? If you use e mail to contact me  e mail is    [email or web address] .   If you call me the number is    [phone].    My phone does not accept text messages, so do not text. I am out of Metoprolol.                          Rian

## 2019-05-20 NOTE — TELEPHONE ENCOUNTER
Patient is wanting prescriptions sent to [facility] pharmacy.   Omeprazole, Metoprolol to [facility] pharmacy.  Patient is informed prescriptions have been sent to [facility] pharmacy.  Verbalized good understanding.   Lluvia Corey RN

## 2019-10-04 ENCOUNTER — TELEPHONE (OUTPATIENT)
Dept: FAMILY MEDICINE | Facility: CLINIC | Age: 74
End: 2019-10-04

## 2019-10-07 NOTE — PATIENT INSTRUCTIONS
Patient Education   Personalized Prevention Plan  You are due for the preventive services outlined below.  Your care team is available to assist you in scheduling these services.  If you have already completed any of these items, please share that information with your care team to update in your medical record.  Health Maintenance Due   Topic Date Due     Zoster (Shingles) Vaccine (1 of 2) 10/26/1993     Colonscopy  04/15/2019     Flu Vaccine (1) 09/01/2019     Basic Metabolic Panel  09/19/2019     Annual Wellness Visit  10/09/2019     FALL RISK ASSESSMENT  10/09/2019         Start xarelto 20 mg every evening to prevent stroke from atrial fibrillation.    Make appointment with Cardiology

## 2019-10-10 NOTE — PROGRESS NOTES
SUBJECTIVE:   Rian Ness is a 73 year old male who presents for Preventive Visit.      Are you in the first 12 months of your Medicare coverage?  No    HPI  Do you feel safe in your environment? Yes    Do you have a Health Care Directive? No: Advance care planning reviewed with patient; information given to patient to review.      Fall risk  Fallen 2 or more times in the past year?: No  Any fall with injury in the past year?: No    Cognitive Screening   1) Repeat 3 items (Leader, Season, Table)    2) Clock draw: NORMAL  3) 3 item recall: Recalls 3 objects  Results: 3 items recalled: COGNITIVE IMPAIRMENT LESS LIKELY    Mini-CogTM Copyright S Sara. Licensed by the author for use in [facility]; reprinted with permission (soob@[facility]). All rights reserved.      Do you have sleep apnea, excessive snoring or daytime drowsiness?: no    Reviewed and updated as needed this visit by clinical staff  Tobacco  Allergies  Meds  Problems  Med Hx  Surg Hx  Fam Hx         Reviewed and updated as needed this visit by Provider  Tobacco  Allergies  Meds  Problems  Med Hx  Surg Hx  Fam Hx        Social History     Tobacco Use     Smoking status: Former Smoker     Packs/day: 1.50     Years: 34.00     Pack years: 51.00     Types: Cigarettes     Last attempt to quit: 1984     Years since quittin.7     Smokeless tobacco: Never Used     Tobacco comment: Nonsmoking household   Substance Use Topics     Alcohol use: Yes     Comment: very lightly     If you drink alcohol do you typically have >3 drinks per day or >7 drinks per week? Yes, every night he has a shot of Amaretto.       Alcohol Use 10/11/2019   Prescreen: >3 drinks/day or >7 drinks/week? Yes               Current providers sharing in care for this patient include:   Patient Care Team:  Junie Golden MD as PCP - General (Family Practice)  Junie Golden MD as Assigned PCP    The following health maintenance items are  reviewed in Epic and correct as of today:  Health Maintenance   Topic Date Due     ZOSTER IMMUNIZATION (1 of 2) 10/26/1993     COLONOSCOPY  04/15/2019     BMP  09/19/2019     MEDICARE ANNUAL WELLNESS VISIT  10/09/2019     PSA  12/04/2019     EYE EXAM  12/10/2019     FALL RISK ASSESSMENT  10/11/2020     ADVANCE CARE PLANNING  11/14/2021     LIPID  11/15/2022     DTAP/TDAP/TD IMMUNIZATION (3 - Td) 12/03/2022     PHQ-2  Completed     INFLUENZA VACCINE  Completed     PNEUMOCOCCAL IMMUNIZATION 65+ LOW/MEDIUM RISK  Completed     AORTIC ANEURYSM SCREENING (SYSTEM ASSIGNED)  Completed     IPV IMMUNIZATION  Aged Out     MENINGITIS IMMUNIZATION  Aged Out   Family history of prostate cancer: Yes pt w/hx of prostate cancer  Last PSA:   PSA   Date Value Ref Range Status   12/04/2018 0.06 0 - 4 ug/L Final     Comment:     Assay Method:  Chemiluminescence using Siemens Vista analyzer     Doing self testicular exam: NO     Family history of colon cancer:No  Last colonscopy: 2009 due     Family history of CAD:No  Last Cholesterol:   Lab Results   Component Value Date    CHOL 165 11/15/2017     Lab Results   Component Value Date    HDL 46 11/15/2017     Lab Results   Component Value Date    LDL 93 11/15/2017     Lab Results   Component Value Date    TRIG 130 11/15/2017     Lab Results   Component Value Date    CHOLHDLRATIO 4.9 11/26/2012          Immunizations:    Date last DT tdap 2012,  Date last Pneumovax completed ,   Date last Flu due  Shingrix recommended      Seat Belt:YES   Sunscreen use: YES  Calcium Intake: adeq   Health Care Directive:NO   Sexually Active:YES      Current contraception: none     Current Concerns:        Patient Ed:  Reviewed health maintenance including diet, regular exercise, and periodic exams.     The risks, benefits, and treatment options of prescribed medications or other treatments have been discussed with the patient.  The patient should call or schedule a follow up appt if no improvement or other  problems.    Labs reviewed in EPIC  BP Readings from Last 3 Encounters:   10/11/19 108/69   19 125/76   19 131/78    Wt Readings from Last 3 Encounters:   10/11/19 96.6 kg (213 lb)   19 96.2 kg (212 lb)   19 93.4 kg (206 lb)                  Patient Active Problem List   Diagnosis     Testicular hypofunction     Osteoarthritis, knee     Malignant neoplasm of prostate (H)     CARDIOVASCULAR SCREENING; LDL GOAL LESS THAN 130     Advanced directives, counseling/discussion     Status post total knee replacement     AC (acromioclavicular) joint arthritis     Biceps tendonitis     Pseudophakia,ou     Hypertension goal BP (blood pressure) < 140/90     Gastroesophageal reflux disease without esophagitis     Posterior vitreous detachment, bilateral     S/P complete repair of rotator cuff     Past Surgical History:   Procedure Laterality Date     ABDOMEN SURGERY       ARTHROSCOPY KNEE RT/LT      right     ARTHROSCOPY KNEE RT/LT      left     C TOTAL KNEE ARTHROPLASTY  10/11    right knee     COLONOSCOPY       PHACOEMULSIFICATION WITH STANDARD INTRAOCULAR LENS IMPLANT  2010; 2010    left eye; right eye     SUPRAPUBIC PROSTATECTOMY       VASCULAR SURGERY         Social History     Tobacco Use     Smoking status: Former Smoker     Packs/day: 1.50     Years: 34.00     Pack years: 51.00     Types: Cigarettes     Last attempt to quit: 1984     Years since quittin.7     Smokeless tobacco: Never Used     Tobacco comment: Nonsmoking household   Substance Use Topics     Alcohol use: Yes     Comment: very lightly     Family History   Problem Relation Age of Onset     Cancer Mother         leukemia     Depression Other      Depression Other      Prostate Cancer Brother      Prostate Cancer Brother      Prostate Cancer Brother          Current Outpatient Medications   Medication Sig Dispense Refill     DAILY MULTIVITAMIN PO 1 tab daily.        loratadine (CLARITIN) 10 MG tablet Take 1  "tablet (10 mg) by mouth daily 90 tablet 3     MAGNESIUM OR 1 tablet daily       metoprolol succinate ER (TOPROL-XL) 50 MG 24 hr tablet Take 1 tablet (50 mg) by mouth daily 90 tablet 1     naproxen sodium (ALEVE) 220 MG capsule Take 220 mg by mouth 2 times daily (with meals).       omeprazole (PRILOSEC) 20 MG DR capsule TAKE 1 CAPSULE 2 TIMES DAILY 180 capsule 3         Review of Systems  Constitutional, HEENT, cardiovascular, pulmonary, GI, , musculoskeletal, neuro, skin, endocrine and psych systems are negative, except as otherwise noted.    OBJECTIVE:   /69   Pulse 119   Temp 98.1  F (36.7  C) (Oral)   Resp 20   Ht 1.676 m (5' 6\")   Wt 96.6 kg (213 lb)   SpO2 98%   BMI 34.38 kg/m   Estimated body mass index is 34.38 kg/m  as calculated from the following:    Height as of this encounter: 1.676 m (5' 6\").    Weight as of this encounter: 96.6 kg (213 lb).  Physical Exam  GENERAL: healthy, alert and no distress  EYES: Eyes grossly normal to inspection, PERRL and conjunctivae and sclerae normal  HENT: ear canals and TM's normal, nose and mouth without ulcers or lesions  NECK: no adenopathy, no asymmetry, masses, or scars and thyroid normal to palpation  RESP: lungs clear to auscultation - no rales, rhonchi or wheezes  CV: irregularly irregular rhythm, normal S1 S2, no S3 or S4, no murmur, click or rub, peripheral pulses strong and no peripheral edema  ABDOMEN: soft, nontender, no hepatosplenomegaly, no masses and bowel sounds normal  MS: no gross musculoskeletal defects noted, no edema  SKIN: no suspicious lesions or rashes  NEURO: Normal strength and tone, mentation intact and speech normal  PSYCH: mentation appears normal, affect normal/bright    Diagnostic Test Results:  Labs reviewed in Epic    ASSESSMENT / PLAN:   (Z00.00) Encounter for Medicare annual wellness exam  (primary encounter diagnosis)  Comment: preventive needs reviewed   Plan: see orders in [facility].     (I48.91) New onset atrial " "fibrillation (H)  (I49.9) Irregular heartbeat  Comment: noted today, mo murmur on exam or h/o valvular disease  Plan:EKG 12-lead complete w/read - Clinics, CARDIOLOGY EVAL ADULT REFERRAL,         TSH with free T4 reflex, Echocardiogram Complete, rivaroxaban        ANTICOAGULANT (XARELTO ANTICOAGULANT) 20 MG         TABS tablet, DISCONTINUED: rivaroxaban         ANTICOAGULANT (XARELTO ANTICOAGULANT) 20 MG         TABS tablet        Discussed need for anticoagulation and options. Pt will trial xarelto    CHADS2 = 2  Make appt with cardiology, echo.  Check labs.      (C61) Malignant neoplasm prostate (H)  Comment: due for f/u lab  Plan: PSA, screen        No longer follows with urology    (I10) Essential hypertension with goal blood pressure less than 140/90  Comment: to goal  Plan: BASIC METABOLIC PANEL, metoprolol succinate ER         (TOPROL-XL) 50 MG 24 hr tablet         Refill x 6 months     (R10.13) Epigastric pain  Comment: stable  Plan: omeprazole (PRILOSEC) 20 MG DR capsule        Refill x 1 yr     (Z23) Need for prophylactic vaccination and inoculation against influenza  Comment: due  Plan: INFLUENZA (HIGH DOSE) 3 VALENT VACCINE [95846],        ADMIN INFLUENZA (For MEDICARE Patients ONLY)         []            (Z12.11) Special screening for malignant neoplasms, colon  Comment: due  Plan:Cologuard ordered      End of Life Planning:  Patient currently has an advanced directive: No.  I have verified the patient's ablity to prepare an advanced directive/make health care decisions.  Literature was provided to assist patient in preparing an advanced directive.    COUNSELING:  Reviewed preventive health counseling, as reflected in patient instructions  Special attention given to:       Regular exercise       Healthy diet/nutrition    Estimated body mass index is 34.38 kg/m  as calculated from the following:    Height as of this encounter: 1.676 m (5' 6\").    Weight as of this encounter: 96.6 kg (213 " lb).    Weight management plan: Discussed healthy diet and exercise guidelines     reports that he quit smoking about 35 years ago. His smoking use included cigarettes. He has a 51.00 pack-year smoking history. He has never used smokeless tobacco.      Appropriate preventive services were discussed with this patient, including applicable screening as appropriate for cardiovascular disease, diabetes, osteopenia/osteoporosis, and glaucoma.  As appropriate for age/gender, discussed screening for colorectal cancer, prostate cancer, breast cancer, and cervical cancer. Checklist reviewing preventive services available has been given to the patient.    Reviewed patients plan of care and provided an AVS. The Basic Care Plan (routine screening as documented in Health Maintenance) for Rian meets the Care Plan requirement. This Care Plan has been established and reviewed with the Patient.    Counseling Resources:  ATP IV Guidelines  Pooled Cohorts Equation Calculator  Breast Cancer Risk Calculator  FRAX Risk Assessment  ICSI Preventive Guidelines  Dietary Guidelines for Americans, 2010  USDA's MyPlate  ASA Prophylaxis  Lung CA Screening    Junie Golden MD  [facility]    Identified Health Risks:

## 2019-10-11 ENCOUNTER — OFFICE VISIT (OUTPATIENT)
Dept: FAMILY MEDICINE | Facility: CLINIC | Age: 74
End: 2019-10-11
Payer: MEDICARE

## 2019-10-11 ENCOUNTER — MYC MEDICAL ADVICE (OUTPATIENT)
Dept: FAMILY MEDICINE | Facility: CLINIC | Age: 74
End: 2019-10-11

## 2019-10-11 VITALS
DIASTOLIC BLOOD PRESSURE: 69 MMHG | HEART RATE: 119 BPM | TEMPERATURE: 98.1 F | HEIGHT: 66 IN | SYSTOLIC BLOOD PRESSURE: 108 MMHG | OXYGEN SATURATION: 98 % | BODY MASS INDEX: 34.23 KG/M2 | RESPIRATION RATE: 20 BRPM | WEIGHT: 213 LBS

## 2019-10-11 DIAGNOSIS — C61 MALIGNANT NEOPLASM PROSTATE (H): ICD-10-CM

## 2019-10-11 DIAGNOSIS — Z12.11 SPECIAL SCREENING FOR MALIGNANT NEOPLASMS, COLON: ICD-10-CM

## 2019-10-11 DIAGNOSIS — Z23 NEED FOR PROPHYLACTIC VACCINATION AND INOCULATION AGAINST INFLUENZA: ICD-10-CM

## 2019-10-11 DIAGNOSIS — Z12.5 ENCOUNTER FOR SCREENING FOR MALIGNANT NEOPLASM OF PROSTATE: ICD-10-CM

## 2019-10-11 DIAGNOSIS — I10 ESSENTIAL HYPERTENSION WITH GOAL BLOOD PRESSURE LESS THAN 140/90: ICD-10-CM

## 2019-10-11 DIAGNOSIS — Z00.00 ENCOUNTER FOR MEDICARE ANNUAL WELLNESS EXAM: Primary | ICD-10-CM

## 2019-10-11 DIAGNOSIS — I49.9 IRREGULAR HEARTBEAT: ICD-10-CM

## 2019-10-11 DIAGNOSIS — R10.13 EPIGASTRIC PAIN: ICD-10-CM

## 2019-10-11 DIAGNOSIS — I48.91 NEW ONSET ATRIAL FIBRILLATION (H): ICD-10-CM

## 2019-10-11 LAB
ANION GAP SERPL CALCULATED.3IONS-SCNC: 4 MMOL/L (ref 3–14)
BUN SERPL-MCNC: 25 MG/DL (ref 7–30)
CALCIUM SERPL-MCNC: 8.9 MG/DL (ref 8.5–10.1)
CHLORIDE SERPL-SCNC: 105 MMOL/L (ref 94–109)
CO2 SERPL-SCNC: 30 MMOL/L (ref 20–32)
CREAT SERPL-MCNC: 0.97 MG/DL (ref 0.66–1.25)
GFR SERPL CREATININE-BSD FRML MDRD: 75 ML/MIN/{1.73_M2}
GLUCOSE SERPL-MCNC: 101 MG/DL (ref 70–99)
POTASSIUM SERPL-SCNC: 4.6 MMOL/L (ref 3.4–5.3)
SODIUM SERPL-SCNC: 139 MMOL/L (ref 133–144)

## 2019-10-11 PROCEDURE — 90662 IIV NO PRSV INCREASED AG IM: CPT | Performed by: FAMILY MEDICINE

## 2019-10-11 PROCEDURE — G0103 PSA SCREENING: HCPCS | Performed by: FAMILY MEDICINE

## 2019-10-11 PROCEDURE — 99213 OFFICE O/P EST LOW 20 MIN: CPT | Mod: 25 | Performed by: FAMILY MEDICINE

## 2019-10-11 PROCEDURE — G0008 ADMIN INFLUENZA VIRUS VAC: HCPCS | Performed by: FAMILY MEDICINE

## 2019-10-11 PROCEDURE — 80048 BASIC METABOLIC PNL TOTAL CA: CPT | Performed by: FAMILY MEDICINE

## 2019-10-11 PROCEDURE — 36415 COLL VENOUS BLD VENIPUNCTURE: CPT | Performed by: FAMILY MEDICINE

## 2019-10-11 PROCEDURE — 84443 ASSAY THYROID STIM HORMONE: CPT | Performed by: FAMILY MEDICINE

## 2019-10-11 PROCEDURE — G0439 PPPS, SUBSEQ VISIT: HCPCS | Performed by: FAMILY MEDICINE

## 2019-10-11 PROCEDURE — 93000 ELECTROCARDIOGRAM COMPLETE: CPT | Performed by: FAMILY MEDICINE

## 2019-10-11 RX ORDER — METOPROLOL SUCCINATE 50 MG/1
50 TABLET, EXTENDED RELEASE ORAL DAILY
Qty: 90 TABLET | Refills: 1 | Status: SHIPPED | OUTPATIENT
Start: 2019-10-11 | End: 2019-10-29

## 2019-10-11 ASSESSMENT — MIFFLIN-ST. JEOR: SCORE: 1643.91

## 2019-10-11 NOTE — NURSING NOTE
"Chief Complaint   Patient presents with     Wellness Visit       Initial /69   Pulse 119   Temp 98.1  F (36.7  C) (Oral)   Resp 20   Ht 1.676 m (5' 6\")   Wt 96.6 kg (213 lb)   SpO2 98%   BMI 34.38 kg/m   Estimated body mass index is 34.38 kg/m  as calculated from the following:    Height as of this encounter: 1.676 m (5' 6\").    Weight as of this encounter: 96.6 kg (213 lb).    Taylor Lamar, CMA    "

## 2019-10-12 LAB
PSA SERPL-ACNC: 0.07 UG/L (ref 0–4)
TSH SERPL DL<=0.005 MIU/L-ACNC: 2.64 MU/L (ref 0.4–4)

## 2019-10-14 NOTE — TELEPHONE ENCOUNTER
Per 10/11 office visit patient is to start taking Xarelto, I do not see any mention of stopping the Metoprolol  To provider to clarify    Danay JEFFRIESN, RN, CPN

## 2019-10-17 ENCOUNTER — ANCILLARY PROCEDURE (OUTPATIENT)
Dept: CARDIOLOGY | Facility: CLINIC | Age: 74
End: 2019-10-17
Attending: FAMILY MEDICINE
Payer: MEDICARE

## 2019-10-17 DIAGNOSIS — I48.91 NEW ONSET ATRIAL FIBRILLATION (H): ICD-10-CM

## 2019-10-17 PROCEDURE — 93306 TTE W/DOPPLER COMPLETE: CPT | Mod: GC | Performed by: INTERNAL MEDICINE

## 2019-10-21 ENCOUNTER — TRANSFERRED RECORDS (OUTPATIENT)
Dept: HEALTH INFORMATION MANAGEMENT | Facility: CLINIC | Age: 74
End: 2019-10-21

## 2019-10-21 LAB — COLOGUARD-ABSTRACT: NEGATIVE

## 2019-10-25 ENCOUNTER — TELEPHONE (OUTPATIENT)
Dept: FAMILY MEDICINE | Facility: CLINIC | Age: 74
End: 2019-10-25

## 2019-10-25 NOTE — TELEPHONE ENCOUNTER
Negative Cologuard results received, Placed in providers basket.  Letter sent to patient, faxed to stat scanning.  SARAI Crouch

## 2019-10-25 NOTE — LETTER
October 25, 2019      Rian Ness  [address] [facility] [id number]      Dear Rian,    The result of your recent Cologuard testing was negative. A negative result means that Cologuard did not detect significant levels of DNA and/or hemoglobin biomarkers in the stool which are associated with colon cancer or precancer.    Thank you for completing your screening, your next screening should be completed in 3 years.      If you have any questions or concerns, please contact your care team at [phone].     Sincerely,  Junie Golden MD  [facility]

## 2019-10-29 ENCOUNTER — TRANSFERRED RECORDS (OUTPATIENT)
Dept: HEALTH INFORMATION MANAGEMENT | Facility: CLINIC | Age: 74
End: 2019-10-29

## 2019-10-29 ENCOUNTER — OFFICE VISIT (OUTPATIENT)
Dept: CARDIOLOGY | Facility: CLINIC | Age: 74
End: 2019-10-29
Attending: FAMILY MEDICINE
Payer: MEDICARE

## 2019-10-29 ENCOUNTER — TELEPHONE (OUTPATIENT)
Dept: CARDIOLOGY | Facility: CLINIC | Age: 74
End: 2019-10-29

## 2019-10-29 VITALS
DIASTOLIC BLOOD PRESSURE: 75 MMHG | SYSTOLIC BLOOD PRESSURE: 124 MMHG | WEIGHT: 210 LBS | OXYGEN SATURATION: 98 % | BODY MASS INDEX: 31.83 KG/M2 | HEIGHT: 68 IN | HEART RATE: 77 BPM

## 2019-10-29 DIAGNOSIS — I83.93 VARICOSE VEINS OF BOTH LOWER EXTREMITIES, UNSPECIFIED WHETHER COMPLICATED: ICD-10-CM

## 2019-10-29 DIAGNOSIS — I10 ESSENTIAL HYPERTENSION: ICD-10-CM

## 2019-10-29 DIAGNOSIS — I48.0 PAF (PAROXYSMAL ATRIAL FIBRILLATION) (H): ICD-10-CM

## 2019-10-29 DIAGNOSIS — I48.0 PAF (PAROXYSMAL ATRIAL FIBRILLATION) (H): Primary | ICD-10-CM

## 2019-10-29 DIAGNOSIS — Z85.46 HISTORY OF PROSTATE CANCER: ICD-10-CM

## 2019-10-29 PROCEDURE — 99204 OFFICE O/P NEW MOD 45 MIN: CPT | Performed by: INTERNAL MEDICINE

## 2019-10-29 PROCEDURE — 93000 ELECTROCARDIOGRAM COMPLETE: CPT | Performed by: INTERNAL MEDICINE

## 2019-10-29 RX ORDER — METOPROLOL SUCCINATE 100 MG/1
100 TABLET, EXTENDED RELEASE ORAL DAILY
Qty: 90 TABLET | Refills: 3 | Status: SHIPPED | OUTPATIENT
Start: 2019-10-29 | End: 2019-10-30

## 2019-10-29 ASSESSMENT — MIFFLIN-ST. JEOR: SCORE: 1657.05

## 2019-10-29 ASSESSMENT — PAIN SCALES - GENERAL: PAINLEVEL: NO PAIN (0)

## 2019-10-29 NOTE — PROGRESS NOTES
Referring provider:Junie Golden MD    HPI: Mr. Rian Ness is a 74 year old  male with PMH significant for prostate carcinoma and hypertension.    The patient was recently found to be in atrial fibrillation when he presented for regular follow-up with Dr. Golden on 10/11/2019.  The patient was feeling mild palpitations on that day.  He reports on and off similar palpitations for over a year but this was never caught on EKG.  The patient denies associated chest pain, dyspnea on exertion, dizziness or syncope.  He is not exactly sure how long the episodes last. He says sometimes they occur two day in a row, sometimes once every few weeks. The patient reports he is able to do his ADLs even though he feels palpitations.  He states` they do not bother me too much`. TSH was checked which was normal.  Echo obtained on 10/17/2018 showed a structurally normal heart with normal EF with no valvular disease. He is being seen today for new onset atrial fibrillation.    The patient has a history of smoking for 51 pack years quit date 1984.  No prior history of  diabetes, hyperlipidemia, atrial fibrillation or any other cardiac disease.  The patient has been on metoprolol 50 mg for hypertension control.  Patient was started on rivaroxaban 10/11.  The patient recently underwent varicose vein surgery on the left leg.  He is expecting to undergo similar operation for the right leg as well.    The patient overall feels well and denies exertional chest pain.  He reports dyspnea on exertion which is not new.  He is a retired  he still tries to stay active.    I reviewed patient's EKG dated 10/11/2019 which shows atrial fibrillation versus flutter heart rate 89 bpm.  I reviewed today's EKG in clinic today which shows sinus rhythm with one PAC, normal MN/QRS and QTc intervals.  No ST-T wave changes.  Normal axis.    Medications, personal, family, and social history reviewed with patient and revised.    PAST  MEDICAL HISTORY:  Past Medical History:   Diagnosis Date     Arthritis      Cancer (H)      Depressive disorder      HTN        CURRENT MEDICATIONS:  Current Outpatient Medications   Medication Sig Dispense Refill     DAILY MULTIVITAMIN PO 1 tab daily.        loratadine (CLARITIN) 10 MG tablet Take 1 tablet (10 mg) by mouth daily 90 tablet 3     MAGNESIUM OR 1 tablet daily       metoprolol succinate ER (TOPROL-XL) 50 MG 24 hr tablet Take 1 tablet (50 mg) by mouth daily 90 tablet 1     naproxen sodium (ALEVE) 220 MG capsule Take 220 mg by mouth 2 times daily (with meals).       omeprazole (PRILOSEC) 20 MG DR capsule TAKE 1 CAPSULE 2 TIMES DAILY 180 capsule 3     rivaroxaban ANTICOAGULANT (XARELTO ANTICOAGULANT) 20 MG TABS tablet Take 1 tablet (20 mg) by mouth daily (with dinner) 30 tablet 1       PAST SURGICAL HISTORY:  Past Surgical History:   Procedure Laterality Date     ABDOMEN SURGERY       ARTHROSCOPY KNEE RT/LT      right     ARTHROSCOPY KNEE RT/LT      left     C TOTAL KNEE ARTHROPLASTY  10/11    right knee     COLONOSCOPY       PHACOEMULSIFICATION WITH STANDARD INTRAOCULAR LENS IMPLANT  2010; 2010    left eye; right eye     SUPRAPUBIC PROSTATECTOMY       VASCULAR SURGERY         ALLERGIES:   No Known Allergies    FAMILY HISTORY:  Family History   Problem Relation Age of Onset     Cancer Mother         leukemia     Depression Other      Depression Other      Prostate Cancer Brother      Prostate Cancer Brother      Prostate Cancer Brother          SOCIAL HISTORY:  Social History     Tobacco Use     Smoking status: Former Smoker     Packs/day: 1.50     Years: 34.00     Pack years: 51.00     Types: Cigarettes     Last attempt to quit: 1984     Years since quittin.7     Smokeless tobacco: Never Used     Tobacco comment: Nonsmoking household   Substance Use Topics     Alcohol use: Yes     Comment: very lightly     Drug use: No       ROS:   Constitutional: No fever, chills, or sweats.  "Weight stable.   ENT: No visual disturbance, ear ache, epistaxis, sore throat.   Cardiovascular: As per HPI.   Respiratory: No cough, hemoptysis.    GI: No nausea, vomiting, hematemesis, melena, or hematochezia.   : No hematuria.   Integument: Negative.   Psychiatric: Negative.   Hematologic:  No easy bruising, no easy bleeding.  Neuro: Negative.   Endocrinology: No significant heat or cold intolerance   Musculoskeletal: No myalgia.    Exam:  /75 (BP Location: Right arm, Patient Position: Sitting, Cuff Size: Adult Large)   Pulse 77   Ht 1.727 m (5' 8\")   Wt 95.3 kg (210 lb)   SpO2 98%   BMI 31.93 kg/m    GENERAL APPEARANCE: alert and no distress  HEENT: no icterus, no central cyanosis  LYMPH/NECK: no adenopathy, no asymmetry, JVP not elevated, no carotid bruits.  RESPIRATORY: lungs clear to auscultation - no rales, rhonchi or wheezes, no use of accessory muscles, no retractions, respirations are unlabored, normal respiratory rate  CARDIOVASCULAR: regular rhythm, normal S1, S2, no S3 or S4 and no murmur, click or rub, precordium quiet with normal PMI.  GI: soft, non tender  EXTREMITIES: Legs cannot be examined since both are wrapped.  NEURO: alert, normal speech,and affect  VASC: Radial pulses are normal in volumes and symmetric bilaterally.  Both legs wrapped with compression stockings for varicose veins  SKIN: no ecchymoses, no rashes     I have reviewed the labs and personally reviewed the imaging below and made my comment in the assessment and plan.    Labs:  CBC RESULTS:   Lab Results   Component Value Date    WBC 6.9 11/10/2014    RBC 4.59 11/10/2014    HGB 13.7 11/10/2014    HCT 42.3 11/10/2014    MCV 92 11/10/2014    MCH 29.8 11/10/2014    MCHC 32.4 11/10/2014    RDW 13.5 11/10/2014     11/10/2014       BMP RESULTS:  Lab Results   Component Value Date     10/11/2019    POTASSIUM 4.6 10/11/2019    CHLORIDE 105 10/11/2019    CO2 30 10/11/2019    ANIONGAP 4 10/11/2019     (H) " 10/11/2019    BUN 25 10/11/2019    CR 0.97 10/11/2019    GFRESTIMATED 75 10/11/2019    GFRESTBLACK 87 10/11/2019    JENNY 8.9 10/11/2019      Echocardiogram 10/17/2019  Left ventricular function is normal. EF > 65%.No regional wall motion  abnormalities are seen.  Global right ventricular function is normal.  No significant valvular abnormalities.  The inferior vena cava was normal in size with preserved respiratory  variability.  No pericardial effusion is present.     There is no prior study for direct comparison.      Assessment and Plan:   Mr. Rian Ness is a 74 year old  male with PMH significant for prostate carcinoma , hypertension and new diagnosis of paroxysmal atrial fibrillation.    1.  Paroxysmal atrial fibrillation: Sinus rhythm in clinic today.  EKG on 10/11 shows atrial fibrillation.  The patient reports periodic palpitations over a year.  Not very symptomatic with the episodes.  He is not even aware of the duration of the episodes.  I recommended to increase metoprolol from 50 mg to 100 mg daily for better rate control.  Patient's chads VASC score is 3  (age 1, hypertension 1) therefore he will need lifelong anticoagulation.  No underlying structural heart disease by echo.  No other reversible causes identified.    2.  Hypertension: Appears to be well controlled on with metoprolol.  Continue current treatment.    3.  Bilateral varicose veins: Patient undergoing varicose vein surgery.  Left completed 1 week ago.  Expecting similar operation on the right side.  Both legs wrapped right now.  The patient has contacted his surgeon and he reports that he is aware of him being on rivaroxaban.    Medication change today: Metoprolol dose increased from 50 to 100 mg daily.  Continue rivaroxaban 20 mg daily    Return to clinic 6 months.    A total of 30 minutes spent face-toface with greater than 50% of the time spent in counseling and coordinating cares of the issues above.     Please donot hesitate to  contact me if you have any questions or concerns. Again, thank you for allowing me to participate in the care of your patient.    Yash LEOS MD  [facility] Division of Cardiology  Pager [phone]

## 2019-10-29 NOTE — LETTER
10/29/2019      RE: Rian Ness  [address] [facility] [place] [address]       Dear Colleague,    Thank you for the opportunity to participate in the care of your patient, Rian Ness, at the [facility] AT [facility] at [facility]. Please see a copy of my visit note below.    Referring provider:Junie Golden MD    HPI: Mr. Rian Ness is a 74 year old  male with PMH significant for prostate carcinoma and hypertension.    The patient was recently found to be in atrial fibrillation when he presented for regular follow-up with Dr. Golden on 10/11/2019.  The patient was feeling mild palpitations on that day.  He reports on and off similar palpitations for over a year but this was never caught on EKG.  The patient denies associated chest pain, dyspnea on exertion, dizziness or syncope.  He is not exactly sure how long the episodes last. He says sometimes they occur two day in a row, sometimes once every few weeks. The patient reports he is able to do his ADLs even though he feels palpitations.  He states` they do not bother me too much`. TSH was checked which was normal.  Echo obtained on 10/17/2018 showed a structurally normal heart with normal EF with no valvular disease. He is being seen today for new onset atrial fibrillation.    The patient has a history of smoking for 51 pack years quit date 1984.  No prior history of  diabetes, hyperlipidemia, atrial fibrillation or any other cardiac disease.  The patient has been on metoprolol 50 mg for hypertension control.  Patient was started on rivaroxaban 10/11.  The patient recently underwent varicose vein surgery on the left leg.  He is expecting to undergo similar operation for the right leg as well.    The patient overall feels well and denies exertional chest pain.  He reports dyspnea on exertion which is not new.  He is a retired  he still tries to stay  active.    I reviewed patient's EKG dated 10/11/2019 which shows atrial fibrillation versus flutter heart rate 89 bpm.  I reviewed today's EKG in clinic today which shows sinus rhythm with one PAC, normal MT/QRS and QTc intervals.  No ST-T wave changes.  Normal axis.    Medications, personal, family, and social history reviewed with patient and revised.    PAST MEDICAL HISTORY:  Past Medical History:   Diagnosis Date     Arthritis      Cancer (H)      Depressive disorder      HTN        CURRENT MEDICATIONS:  Current Outpatient Medications   Medication Sig Dispense Refill     DAILY MULTIVITAMIN PO 1 tab daily.        loratadine (CLARITIN) 10 MG tablet Take 1 tablet (10 mg) by mouth daily 90 tablet 3     MAGNESIUM OR 1 tablet daily       metoprolol succinate ER (TOPROL-XL) 50 MG 24 hr tablet Take 1 tablet (50 mg) by mouth daily 90 tablet 1     naproxen sodium (ALEVE) 220 MG capsule Take 220 mg by mouth 2 times daily (with meals).       omeprazole (PRILOSEC) 20 MG DR capsule TAKE 1 CAPSULE 2 TIMES DAILY 180 capsule 3     rivaroxaban ANTICOAGULANT (XARELTO ANTICOAGULANT) 20 MG TABS tablet Take 1 tablet (20 mg) by mouth daily (with dinner) 30 tablet 1       PAST SURGICAL HISTORY:  Past Surgical History:   Procedure Laterality Date     ABDOMEN SURGERY       ARTHROSCOPY KNEE RT/LT  2000    right     ARTHROSCOPY KNEE RT/LT  1998    left     C TOTAL KNEE ARTHROPLASTY  10/11    right knee     COLONOSCOPY       PHACOEMULSIFICATION WITH STANDARD INTRAOCULAR LENS IMPLANT  1/2010; 2/2010    left eye; right eye     SUPRAPUBIC PROSTATECTOMY  2/11     VASCULAR SURGERY         ALLERGIES:   No Known Allergies    FAMILY HISTORY:  Family History   Problem Relation Age of Onset     Cancer Mother         leukemia     Depression Other      Depression Other      Prostate Cancer Brother      Prostate Cancer Brother      Prostate Cancer Brother          SOCIAL HISTORY:  Social History     Tobacco Use     Smoking status: Former Smoker      "Packs/day: 1.50     Years: 34.00     Pack years: 51.00     Types: Cigarettes     Last attempt to quit: 1984     Years since quittin.7     Smokeless tobacco: Never Used     Tobacco comment: Nonsmoking household   Substance Use Topics     Alcohol use: Yes     Comment: very lightly     Drug use: No       ROS:   Constitutional: No fever, chills, or sweats. Weight stable.   ENT: No visual disturbance, ear ache, epistaxis, sore throat.   Cardiovascular: As per HPI.   Respiratory: No cough, hemoptysis.    GI: No nausea, vomiting, hematemesis, melena, or hematochezia.   : No hematuria.   Integument: Negative.   Psychiatric: Negative.   Hematologic:  No easy bruising, no easy bleeding.  Neuro: Negative.   Endocrinology: No significant heat or cold intolerance   Musculoskeletal: No myalgia.    Exam:  /75 (BP Location: Right arm, Patient Position: Sitting, Cuff Size: Adult Large)   Pulse 77   Ht 1.727 m (5' 8\")   Wt 95.3 kg (210 lb)   SpO2 98%   BMI 31.93 kg/m     GENERAL APPEARANCE: alert and no distress  HEENT: no icterus, no central cyanosis  LYMPH/NECK: no adenopathy, no asymmetry, JVP not elevated, no carotid bruits.  RESPIRATORY: lungs clear to auscultation - no rales, rhonchi or wheezes, no use of accessory muscles, no retractions, respirations are unlabored, normal respiratory rate  CARDIOVASCULAR: regular rhythm, normal S1, S2, no S3 or S4 and no murmur, click or rub, precordium quiet with normal PMI.  GI: soft, non tender  EXTREMITIES: Legs cannot be examined since both are wrapped.  NEURO: alert, normal speech,and affect  VASC: Radial pulses are normal in volumes and symmetric bilaterally.  Both legs wrapped with compression stockings for varicose veins  SKIN: no ecchymoses, no rashes     I have reviewed the labs and personally reviewed the imaging below and made my comment in the assessment and plan.    Labs:  CBC RESULTS:   Lab Results   Component Value Date    WBC 6.9 11/10/2014    RBC 4.59 " 11/10/2014    HGB 13.7 11/10/2014    HCT 42.3 11/10/2014    MCV 92 11/10/2014    MCH 29.8 11/10/2014    MCHC 32.4 11/10/2014    RDW 13.5 11/10/2014     11/10/2014       BMP RESULTS:  Lab Results   Component Value Date     10/11/2019    POTASSIUM 4.6 10/11/2019    CHLORIDE 105 10/11/2019    CO2 30 10/11/2019    ANIONGAP 4 10/11/2019     (H) 10/11/2019    BUN 25 10/11/2019    CR 0.97 10/11/2019    GFRESTIMATED 75 10/11/2019    GFRESTBLACK 87 10/11/2019    JENNY 8.9 10/11/2019      Echocardiogram 10/17/2019  Left ventricular function is normal. EF > 65%.No regional wall motion  abnormalities are seen.  Global right ventricular function is normal.  No significant valvular abnormalities.  The inferior vena cava was normal in size with preserved respiratory  variability.  No pericardial effusion is present.     There is no prior study for direct comparison.      Assessment and Plan:   Mr. Rian Ness is a 74 year old  male with PMH significant for prostate carcinoma , hypertension and new diagnosis of paroxysmal atrial fibrillation.    1.  Paroxysmal atrial fibrillation: Sinus rhythm in clinic today.  EKG on 10/11 shows atrial fibrillation.  The patient reports periodic palpitations over a year.  Not very symptomatic with the episodes.  He is not even aware of the duration of the episodes.  I recommended to increase metoprolol from 50 mg to 100 mg daily for better rate control.  Patient's chads VASC score is 3  (age 1, hypertension 1) therefore he will need lifelong anticoagulation.  No underlying structural heart disease by echo.  No other reversible causes identified.    2.  Hypertension: Appears to be well controlled on with metoprolol.  Continue current treatment.    3.  Bilateral varicose veins: Patient undergoing varicose vein surgery.  Left completed 1 week ago.  Expecting similar operation on the right side.  Both legs wrapped right now.  The patient has contacted his surgeon and he reports  that he is aware of him being on rivaroxaban.    Medication change today: Metoprolol dose increased from 50 to 100 mg daily.  Continue rivaroxaban 20 mg daily    Return to clinic 6 months.    A total of 30 minutes spent face-toface with greater than 50% of the time spent in counseling and coordinating cares of the issues above.     Please donot hesitate to contact me if you have any questions or concerns. Again, thank you for allowing me to participate in the care of your patient.    Yash LEOS MD  [facility] Division of Cardiology  Pager [phone]

## 2019-10-29 NOTE — TELEPHONE ENCOUNTER
[facility] Call Center    Phone Message    May a detailed message be left on voicemail: yes    Reason for Call: Medication Question or concern regarding medication   Prescription Clarification  Name of Medication: metoprolol succinate ER (TOPROL-XL) 100 MG 24 hr tablet   Prescribing Provider: Dr. Yash Stevens   Pharmacy: [facility] - [place] [phone]   What on the order needs clarification? Pt does not want to  prescription from [facility], is requesting it to be submitted to [facility] mail delivery          Action Taken: Message routed to:  Other: fk heart

## 2019-10-30 RX ORDER — METOPROLOL SUCCINATE 100 MG/1
100 TABLET, EXTENDED RELEASE ORAL DAILY
Qty: 90 TABLET | Refills: 3 | Status: SHIPPED | OUTPATIENT
Start: 2019-10-30 | End: 2020-10-11

## 2019-11-01 NOTE — NURSING NOTE
"Chief Complaint   Patient presents with     RECHECK     new pt. New onset afib, htn Pt. reports dizziness, fatigue, and PATTON. (RL)       Initial /75 (BP Location: Right arm, Patient Position: Sitting, Cuff Size: Adult Large)   Pulse 77   Ht 1.727 m (5' 8\")   Wt 95.3 kg (210 lb)   SpO2 98%   BMI 31.93 kg/m   Estimated body mass index is 31.93 kg/m  as calculated from the following:    Height as of this encounter: 1.727 m (5' 8\").    Weight as of this encounter: 95.3 kg (210 lb)..  BP completed using cuff size: Adult Regular    EKG ordered and completed    Kina Dasilva NREMT-TOÑO  "

## 2019-11-19 ENCOUNTER — OFFICE VISIT (OUTPATIENT)
Dept: ORTHOPEDICS | Facility: CLINIC | Age: 74
End: 2019-11-19
Payer: MEDICARE

## 2019-11-19 ENCOUNTER — ANCILLARY PROCEDURE (OUTPATIENT)
Dept: GENERAL RADIOLOGY | Facility: CLINIC | Age: 74
End: 2019-11-19
Attending: PEDIATRICS
Payer: MEDICARE

## 2019-11-19 ENCOUNTER — TELEPHONE (OUTPATIENT)
Dept: FAMILY MEDICINE | Facility: CLINIC | Age: 74
End: 2019-11-19

## 2019-11-19 VITALS
WEIGHT: 210 LBS | HEIGHT: 68 IN | BODY MASS INDEX: 31.83 KG/M2 | DIASTOLIC BLOOD PRESSURE: 60 MMHG | SYSTOLIC BLOOD PRESSURE: 104 MMHG

## 2019-11-19 DIAGNOSIS — Z96.653 HX OF TOTAL KNEE ARTHROPLASTY, BILATERAL: ICD-10-CM

## 2019-11-19 DIAGNOSIS — I48.91 NEW ONSET ATRIAL FIBRILLATION (H): ICD-10-CM

## 2019-11-19 DIAGNOSIS — M25.561 RIGHT KNEE PAIN, UNSPECIFIED CHRONICITY: Primary | ICD-10-CM

## 2019-11-19 DIAGNOSIS — J01.00 ACUTE MAXILLARY SINUSITIS: Primary | ICD-10-CM

## 2019-11-19 PROCEDURE — 73562 X-RAY EXAM OF KNEE 3: CPT | Mod: RT

## 2019-11-19 PROCEDURE — 99203 OFFICE O/P NEW LOW 30 MIN: CPT | Performed by: PEDIATRICS

## 2019-11-19 RX ORDER — WARFARIN SODIUM 5 MG/1
5 TABLET ORAL DAILY
Qty: 45 TABLET | Refills: 0 | Status: SHIPPED | OUTPATIENT
Start: 2019-11-19 | End: 2019-12-23

## 2019-11-19 RX ORDER — WARFARIN SODIUM 5 MG/1
TABLET ORAL
Qty: 90 TABLET | Refills: 0 | OUTPATIENT
Start: 2019-11-19

## 2019-11-19 ASSESSMENT — MIFFLIN-ST. JEOR: SCORE: 1657.05

## 2019-11-19 NOTE — PATIENT INSTRUCTIONS
Right knee x-ray is without any concerning change when compared to prior x-rays from 2011  It is possible this flared up from recent activity, even if there was no injury  Ok to continue with monitoring; icing, elevation as needed  Work on home exercises from prior physical therapy, to improve range of motion  If any concerning changes, or if not improving in the next 7-10 days, contact clinic

## 2019-11-19 NOTE — PROGRESS NOTES
Sports Medicine Clinic Visit    PCP: Junie Golden Grover is a 74 year old male who is seen  in consultation at the request of Junie Golden MD as an AIC presenting with right knee pain.  Pain has been present for the past 5 days following working in the yard.  Pain is diffuse thru the knee.    HX of TKA done by Dr. Orantes (2011).  No contact with Dr. Orantes lately.   He states he has never had relief from the TKA.   Did recently have vein procedures as well  Feels there is a 75% improvement since Friday morning.      **    Pain present over the medial knee. He notes the knee if diffusely swollen.  No skin changes. No pain at rest. Pain upon knee extension. Feels knee is ready to buckle while walking.      Dr. Orantes also performed a TKA on his left knee. Notes he never regained full motion afterward.  Notes Dr. Orantes straightened out his legs, now feels an ache in his hips.      Injury: ongoing    Location of Pain: right knee, diffuse   Duration of Pain: 5 day(s) on chronic   Rating of Pain at worst: 10+/10  Rating of Pain Currently: 5/10  Symptoms are better with: Rest  Symptoms are worse with: any movement   Additional Features:   Positive: swelling   Negative: bruising, popping, grinding, catching, locking, instability, paresthesias, numbness, weakness, pain in other joints and systemic symptoms  Other evaluation and/or treatments so far consists of: Nothing currently   Prior History of related problems: TKA     Social History: retired     Review of Systems  Musculoskeletal: as above  Remainder of review of systems is negative including constitutional, CV, pulmonary, GI, Skin and Neurologic except as noted in HPI or medical history.    Past Medical History:   Diagnosis Date     Arthritis      Cancer (H)      Depressive disorder      HTN      Past Surgical History:   Procedure Laterality Date     ABDOMEN SURGERY       ARTHROSCOPY KNEE RT/LT  2000    right     ARTHROSCOPY KNEE RT/LT  1998     left     C TOTAL KNEE ARTHROPLASTY  10/11    right knee     COLONOSCOPY       PHACOEMULSIFICATION WITH STANDARD INTRAOCULAR LENS IMPLANT  2010; 2010    left eye; right eye     SUPRAPUBIC PROSTATECTOMY       VASCULAR SURGERY       Family History   Problem Relation Age of Onset     Cancer Mother         leukemia     Depression Other      Depression Other      Prostate Cancer Brother      Prostate Cancer Brother      Prostate Cancer Brother      Social History     Socioeconomic History     Marital status:      Spouse name: Not on file     Number of children: Not on file     Years of education: Not on file     Highest education level: Not on file   Occupational History     Not on file   Social Needs     Financial resource strain: Not on file     Food insecurity:     Worry: Not on file     Inability: Not on file     Transportation needs:     Medical: Not on file     Non-medical: Not on file   Tobacco Use     Smoking status: Former Smoker     Packs/day: 1.50     Years: 34.00     Pack years: 51.00     Types: Cigarettes     Last attempt to quit: 1984     Years since quittin.8     Smokeless tobacco: Never Used     Tobacco comment: Nonsmoking household   Substance and Sexual Activity     Alcohol use: Yes     Comment: very lightly     Drug use: No     Sexual activity: Never     Partners: Female     Birth control/protection: Implant   Lifestyle     Physical activity:     Days per week: Not on file     Minutes per session: Not on file     Stress: Not on file   Relationships     Social connections:     Talks on phone: Not on file     Gets together: Not on file     Attends [employer] witness service: Not on file     Active member of club or organization: Not on file     Attends meetings of clubs or organizations: Not on file     Relationship status: Not on file     Intimate partner violence:     Fear of current or ex partner: Not on file     Emotionally abused: Not on file     Physically abused: Not on file      "Forced sexual activity: Not on file   Other Topics Concern     Parent/sibling w/ CABG, MI or angioplasty before 65F 55M? No   Social History Narrative     Not on file     This document serves as a record of the services and decisions personally performed and made by DO JUAN Mccarthy. It was created on his behalf by Tyron Barnhart, a trained medical scribe. The creation of this document is based the provider's statements to the medical scribe.    Scribe Tyron Barnhart 1:49 PM 11/19/2019       Objective  /60   Ht 1.727 m (5' 8\")   Wt 95.3 kg (210 lb)   BMI 31.93 kg/m      GENERAL APPEARANCE: healthy, alert and no distress   GAIT: antalgic and cane  SKIN: no suspicious lesions or rashes  NEURO: Normal strength and tone, mentation intact and speech normal  PSYCH:  mentation appears normal and affect normal/bright  HEENT: no scleral icterus  CV: Distal perfusion intact.   RESP: nonlabored breathing     Right Knee exam    ROM:        Able to initiate motions flexion, extension    Inspection:       Knee fullness       No visible ecchymosis        Mild warmth to the knee  No erythema    Skin:       no visible deformities       well perfused       capillary refill brisk  Surgical scar    Tender:        Non-tender    Stable varus, valgus stress      Radiology  Visualized radiographs of the right knee today, and reviewed the images with the patient.  Impression: TKA. No apparent acute abnormality.     Recent Results (from the past 24 hour(s))   XR Knee Right 3 Views    Narrative    RIGHT KNEE THREE VIEWS   11/19/2019 12:43 PM    HISTORY: Right knee pain. Arthroplasty.    COMPARISON: 11/23/2011    FINDINGS: Again seen are surgical changes of a total knee  arthroplasty. The hardware remains intact and unremarkable. No other  abnormality is noted. I see no definite change since the previous  examination.      Impression    IMPRESSION: Unchanged and unremarkable postoperative appearance of the  right knee. "     ROSIE ANTONIO MD         Visualized radiographs of the right knee, 11/23/11, and reviewed the images with the patient.  Impression: no significant change in comparison.     THREE VIEWS RIGHT KNEE  Nov 23, 2011 2:48:00 PM      HISTORY: Status post total knee arthroplasty.     COMPARISON: 8/16/2011.     FINDINGS: Total knee arthroplasty has been completed in interval from  prior study. Femoral, tibial, and patellar components appear  well-seated. Suggestion of minimal genu valga.        Assessment:  1. Right knee pain, unspecified chronicity    2. Hx of total knee arthroplasty, bilateral        Plan:  Discussed the assessment with the patient. Improving pain by history. Suspect related to overuse. He has some limited ROM. May improve with time. Will primarily monitor for now, with doing HEP from prior therapy. See patient instructions.  **    Radiologic images reviewed and discussed with patient today.  We discussed the following: symptom treatment, activity modification/rest and rehab. Following discussion, plan:     Topical treatments: Ice and elevate as needed  Rehab: Discussed the possibility of getting him back into PT. Will hold off for now. Will continue with home exercises. If symptoms not improving in 7-10 days, he will contact me.  Follow up: 7-10 days, sooner if needed  Questions answered.  Patient demonstrated understanding of these issues and agrees with the plan.     Eriberto Caro, DO, CAQ          Patient Instructions   Right knee x-ray is without any concerning change when compared to prior x-rays from 2011  It is possible this flared up from recent activity, even if there was no injury  Ok to continue with monitoring; icing, elevation as needed  Work on home exercises from prior physical therapy, to improve range of motion  If any concerning changes, or if not improving in the next 7-10 days, contact clinic        Disclaimer: This note consists of symbols derived from keyboarding,  dictation and/or voice recognition software. As a result, there may be errors in the script that have gone undetected. Please consider this when interpreting information found in this chart.    The information in this document, created by a scribe for me, accurately reflects the services I personally performed and the decisions made by me. I have reviewed and approved this document for accuracy.

## 2019-11-19 NOTE — TELEPHONE ENCOUNTER
Left message on answering machine for patient to call back team RN, Lluvia SIMPSON -[phone]  Nadira Garza RN

## 2019-11-19 NOTE — TELEPHONE ENCOUNTER
Patient reports  1.  2 days ago, work up with right red knee, warm with pain.  + swelling and difficult to bend.  Symptoms have not improved despite Tylenol and cold packs.    2.  Patient c/o cough, nasal congestion, chest congestion, x 3 weeks.    3.  Patient reports will stop Xarelto due to cost.  $95 every 2 mos.  Too costly.  4.  HR < 100, intermittent for a long time, mostly in the evening, HR usually runs around 70.  Blood pressure 105/80    Information above is reviewed with Dr Junie Golden.  1.  Patient needs to be seen today at   [facility] at  [phone]  Acute Injury Care Hours  Monday -Thursday 8 am - 8 pm  Friday 8 am - 5 pm  Saturday 8 am - 2 pm  [address] [place] [address] [phone]  2.  Start Augmentin 1 tab 2 times daily.  Push fluids, rest, Tylenol for discomfort.  Patient can not take ASA products or NSAID's    3.  Stop Xarelto, start Warfarin 5 mg daily to be taken in the evening.  Follow up with the INR nurse 3 days.  Appointment scheduled for 11/22/19.  4.  Regarding the HR, as long as it remain < 125, no change in plan, unless becomes bothersome.      Patient/parent verbalized understanding of instructions provided and agreed with the plan of care    Lluvia Corey RN

## 2019-11-19 NOTE — LETTER
11/19/2019         RE: Rian Ness  [address] [facility] [address]        Dear Colleague,    Thank you for referring your patient, iRan Ness, to the [facility] [place]. Please see a copy of my visit note below.    Sports Medicine Clinic Visit    PCP: Junie Golden    Rian Ness is a 74 year old male who is seen  in consultation at the request of Junie Golden MD as an AIC presenting with right knee pain.  Pain has been present for the past 5 days following working in the yard.  Pain is diffuse thru the knee.    HX of TKA done by Dr. Orantes (2011).  No contact with Dr. Orantes lately.   He states he has never had relief from the TKA.   Did recently have vein procedures as well  Feels there is a 75% improvement since Friday morning.      **    Pain present over the medial knee. He notes the knee if diffusely swollen.  No skin changes. No pain at rest. Pain upon knee extension. Feels knee is ready to buckle while walking.      Dr. Orantes also performed a TKA on his left knee. Notes he never regained full motion afterward.  Notes Dr. Orantes straightened out his legs, now feels an ache in his hips.      Injury: ongoing    Location of Pain: right knee, diffuse   Duration of Pain: 5 day(s) on chronic   Rating of Pain at worst: 10+/10  Rating of Pain Currently: 5/10  Symptoms are better with: Rest  Symptoms are worse with: any movement   Additional Features:   Positive: swelling   Negative: bruising, popping, grinding, catching, locking, instability, paresthesias, numbness, weakness, pain in other joints and systemic symptoms  Other evaluation and/or treatments so far consists of: Nothing currently   Prior History of related problems: TKA     Social History: retired     Review of Systems  Musculoskeletal: as above  Remainder of review of systems is negative including constitutional, CV, pulmonary, GI, Skin and Neurologic except as noted in HPI or medical  history.    Past Medical History:   Diagnosis Date     Arthritis      Cancer (H)      Depressive disorder      HTN      Past Surgical History:   Procedure Laterality Date     ABDOMEN SURGERY       ARTHROSCOPY KNEE RT/LT      right     ARTHROSCOPY KNEE RT/LT      left     C TOTAL KNEE ARTHROPLASTY  10/11    right knee     COLONOSCOPY       PHACOEMULSIFICATION WITH STANDARD INTRAOCULAR LENS IMPLANT  2010; 2010    left eye; right eye     SUPRAPUBIC PROSTATECTOMY       VASCULAR SURGERY       Family History   Problem Relation Age of Onset     Cancer Mother         leukemia     Depression Other      Depression Other      Prostate Cancer Brother      Prostate Cancer Brother      Prostate Cancer Brother      Social History     Socioeconomic History     Marital status:      Spouse name: Not on file     Number of children: Not on file     Years of education: Not on file     Highest education level: Not on file   Occupational History     Not on file   Social Needs     Financial resource strain: Not on file     Food insecurity:     Worry: Not on file     Inability: Not on file     Transportation needs:     Medical: Not on file     Non-medical: Not on file   Tobacco Use     Smoking status: Former Smoker     Packs/day: 1.50     Years: 34.00     Pack years: 51.00     Types: Cigarettes     Last attempt to quit: 1984     Years since quittin.8     Smokeless tobacco: Never Used     Tobacco comment: Nonsmoking household   Substance and Sexual Activity     Alcohol use: Yes     Comment: very lightly     Drug use: No     Sexual activity: Never     Partners: Female     Birth control/protection: Implant   Lifestyle     Physical activity:     Days per week: Not on file     Minutes per session: Not on file     Stress: Not on file   Relationships     Social connections:     Talks on phone: Not on file     Gets together: Not on file     Attends [employer] service: Not on file     Active member of club or  "organization: Not on file     Attends meetings of clubs or organizations: Not on file     Relationship status: Not on file     Intimate partner violence:     Fear of current or ex partner: Not on file     Emotionally abused: Not on file     Physically abused: Not on file     Forced sexual activity: Not on file   Other Topics Concern     Parent/sibling w/ CABG, MI or angioplasty before 65F 55M? No   Social History Narrative     Not on file     This document serves as a record of the services and decisions personally performed and made by DO JUAN Mccarthy. It was created on his behalf by Tyron Barnhart, a trained medical scribe. The creation of this document is based the provider's statements to the medical scribe.    Scribkathie Barnhart 1:49 PM 11/19/2019       Objective  /60   Ht 1.727 m (5' 8\")   Wt 95.3 kg (210 lb)   BMI 31.93 kg/m       GENERAL APPEARANCE: healthy, alert and no distress   GAIT: antalgic and cane  SKIN: no suspicious lesions or rashes  NEURO: Normal strength and tone, mentation intact and speech normal  PSYCH:  mentation appears normal and affect normal/bright  HEENT: no scleral icterus  CV: Distal perfusion intact.   RESP: nonlabored breathing     Right Knee exam    ROM:        Able to initiate motions flexion, extension    Inspection:       Knee fullness       No visible ecchymosis        Mild warmth to the knee  No erythema    Skin:       no visible deformities       well perfused       capillary refill brisk  Surgical scar    Tender:        Non-tender    Stable varus, valgus stress      Radiology  Visualized radiographs of the right knee today, and reviewed the images with the patient.  Impression: TKA. No apparent acute abnormality.     Recent Results (from the past 24 hour(s))   XR Knee Right 3 Views    Narrative    RIGHT KNEE THREE VIEWS   11/19/2019 12:43 PM    HISTORY: Right knee pain. Arthroplasty.    COMPARISON: 11/23/2011    FINDINGS: Again seen are surgical changes of " a total knee  arthroplasty. The hardware remains intact and unremarkable. No other  abnormality is noted. I see no definite change since the previous  examination.      Impression    IMPRESSION: Unchanged and unremarkable postoperative appearance of the  right knee.     ROSIE ANTONIO MD         Visualized radiographs of the right knee, 11/23/11, and reviewed the images with the patient.  Impression: no significant change in comparison.     THREE VIEWS RIGHT KNEE  Nov 23, 2011 2:48:00 PM      HISTORY: Status post total knee arthroplasty.     COMPARISON: 8/16/2011.     FINDINGS: Total knee arthroplasty has been completed in interval from  prior study. Femoral, tibial, and patellar components appear  well-seated. Suggestion of minimal genu valga.        Assessment:  1. Right knee pain, unspecified chronicity    2. Hx of total knee arthroplasty, bilateral        Plan:  Discussed the assessment with the patient. Improving pain by history. Suspect related to overuse. He has some limited ROM. May improve with time. Will primarily monitor for now, with doing HEP from prior therapy. See patient instructions.  **    Radiologic images reviewed and discussed with patient today.  We discussed the following: symptom treatment, activity modification/rest and rehab. Following discussion, plan:     Topical treatments: Ice and elevate as needed  Rehab: Discussed the possibility of getting him back into PT. Will hold off for now. Will continue with home exercises. If symptoms not improving in 7-10 days, he will contact me.  Follow up: 7-10 days, sooner if needed  Questions answered.  Patient demonstrated understanding of these issues and agrees with the plan.     Eriberto Caro, DO, CAQ          Patient Instructions   Right knee x-ray is without any concerning change when compared to prior x-rays from 2011  It is possible this flared up from recent activity, even if there was no injury  Ok to continue with monitoring; icing,  elevation as needed  Work on home exercises from prior physical therapy, to improve range of motion  If any concerning changes, or if not improving in the next 7-10 days, contact clinic        Disclaimer: This note consists of symbols derived from keyboarding, dictation and/or voice recognition software. As a result, there may be errors in the script that have gone undetected. Please consider this when interpreting information found in this chart.    The information in this document, created by a scribe for me, accurately reflects the services I personally performed and the decisions made by me. I have reviewed and approved this document for accuracy.          Again, thank you for allowing me to participate in the care of your patient.        Sincerely,        Eriberto Caro, DO

## 2019-11-19 NOTE — TELEPHONE ENCOUNTER
Sent by My Chart Apt Request,    Please triage rapid heartbeat.    ----- Message -----   From: Rian Ness   Sent: 11/18/2019   2:34 PM CST   To: An Reception   Subject: Appointment Request                                Appointment Request From: Rian Ness      With Provider: Junie Golden MD [[facility]]      Preferred Date Range: Any      Preferred Times: Any time      Reason for visit: Request an Appointment      Comments:   My prescription for Xarelto .I cannot afford $95.00 every 60 days.   I feel weak and tired, headache, right knee swelled up, stiff, could not bend,hardly able to walk, bad cough, chest congestion,rapid heartbeat.    Mon., Wed., Fri.,open, Thur.,PM open.   Can be reached at    [phone]  . CELL PHONE.

## 2019-11-21 ENCOUNTER — TELEPHONE (OUTPATIENT)
Dept: FAMILY MEDICINE | Facility: CLINIC | Age: 74
End: 2019-11-21

## 2019-11-21 DIAGNOSIS — I48.0 PAF (PAROXYSMAL ATRIAL FIBRILLATION) (H): Primary | ICD-10-CM

## 2019-11-21 NOTE — TELEPHONE ENCOUNTER
On anticoagulation schedule tomorrow 11/2 for new start of warfarin. Switching from Rivaroxaban. Diagnosis Paroxysmal Atrial Fib. Charley Francisco RN

## 2019-11-22 ENCOUNTER — ANTICOAGULATION THERAPY VISIT (OUTPATIENT)
Dept: NURSING | Facility: CLINIC | Age: 74
End: 2019-11-22
Payer: MEDICARE

## 2019-11-22 DIAGNOSIS — I48.0 PAF (PAROXYSMAL ATRIAL FIBRILLATION) (H): ICD-10-CM

## 2019-11-22 LAB — INR POINT OF CARE: 1.2 (ref 0.86–1.14)

## 2019-11-22 NOTE — PROGRESS NOTES
ANTICOAGULATION INITIAL CLINIC VISIT    Patient Name:  Rian Ness  Date:  2019  Referred by: Dr Junie Golden  Contact Type:  Face to Face    SUBJECTIVE:  Coumadin education was completed today.  Topics covered include:  -Introduction to coumadin  -Proper Administration  -INR Testing  -Sign/Symptoms of Bleeding  -Signs/Symptoms of Clot Formation or Stroke  -Dietary Intake of Vitamin K  -Drug Interactions  -Anticoagulation Identification (bracelet, necklace or wallet card)  -Future Surgery  -Effects of Alcohol, Tobacco, and Exercise on Coumadin    Coumadin Education Booklet  given to the patient.    Patient Findings     Positives:   Change in health, Change in medications    Comments:   Switching from xarelto to warfarin due to cost. Has taken 3 doses of warfarin. Also on  Day 4/10 Augmentin for cough/cold. This is improving a little. Uses tylenol for knee pain. Takes daily vitamin. Diet is pretty consistent. Reviewed warfarin teaching as per protocol. Given written instructions and told to call if questions.         Clinical Outcomes     Comments:   Switching from xarelto to warfarin due to cost. Has taken 3 doses of warfarin. Also on  Day 4/10 Augmentin for cough/cold. This is improving a little. Uses tylenol for knee pain. Takes daily vitamin. Diet is pretty consistent. Reviewed warfarin teaching as per protocol. Given written instructions and told to call if questions.           OBJECTIVE    INR Protime   Date Value Ref Range Status   2019 1.2 (A) 0.86 - 1.14 Final       ASSESSMENT / PLAN  INR assessment SUB    Recheck INR In: 4 DAYS    INR Location Clinic      Anticoagulation Summary  As of 2019    INR goal:   2.0-3.0   TTR:   --   INR used for dosin.2! (2019)   Warfarin maintenance plan:   No maintenance plan   Full warfarin instructions:   : 7.5 mg; : 7.5 mg; : 7.5 mg; : 5 mg   Plan last modified:   Charley Francisco RN (2019)   Next INR check:    11/26/2019   Target end date:       Indications    PAF (paroxysmal atrial fibrillation) (H) [I48.0]             Anticoagulation Episode Summary     INR check location:       Preferred lab:       Send INR reminders to:   LATIA MEJIAS    Comments:         Anticoagulation Care Providers     Provider Role Specialty Phone number    Junie Golden MD [facility] [phone]            See the Encounter Report to view Anticoagulation Flowsheet and Dosing Calendar (Go to Encounters tab in chart review, and find the Anticoagulation Therapy Visit)    Dosage adjustment made based on physician directed care plan.    Charley Francisco RN

## 2019-11-26 ENCOUNTER — ANTICOAGULATION THERAPY VISIT (OUTPATIENT)
Dept: NURSING | Facility: CLINIC | Age: 74
End: 2019-11-26
Payer: MEDICARE

## 2019-11-26 DIAGNOSIS — I48.0 PAF (PAROXYSMAL ATRIAL FIBRILLATION) (H): ICD-10-CM

## 2019-11-26 LAB — INR POINT OF CARE: 2.5 (ref 0.86–1.14)

## 2019-11-26 PROCEDURE — 85610 PROTHROMBIN TIME: CPT | Mod: QW

## 2019-11-26 PROCEDURE — 36416 COLLJ CAPILLARY BLOOD SPEC: CPT

## 2019-11-26 NOTE — PROGRESS NOTES
ANTICOAGULATION FOLLOW-UP CLINIC VISIT    Patient Name:  Rian Ness  Date:  2019  Contact Type:  Face to Face    SUBJECTIVE:  Patient Findings     Comments:   Therapeutic. Increase from 1.2 to 2.5. Will adjust dose and recheck in 6 days due to holiday. No questions or concerns today.        Clinical Outcomes     Comments:   Therapeutic. Increase from 1.2 to 2.5. Will adjust dose and recheck in 6 days due to holiday. No questions or concerns today.           OBJECTIVE    INR Protime   Date Value Ref Range Status   2019 2.5 (A) 0.86 - 1.14 Final       ASSESSMENT / PLAN  INR assessment THER    Recheck INR In: 6 DAYS    INR Location Clinic      Anticoagulation Summary  As of 2019    INR goal:   2.0-3.0   TTR:   --   INR used for dosin.5 (2019)   Warfarin maintenance plan:   7.5 mg (5 mg x 1.5) every Fri; 5 mg (5 mg x 1) all other days   Full warfarin instructions:   7.5 mg every Fri; 5 mg all other days   Weekly warfarin total:   37.5 mg   Plan last modified:   Charley Francisco RN (2019)   Next INR check:   2019   Priority:   High   Target end date:       Indications    PAF (paroxysmal atrial fibrillation) (H) [I48.0]             Anticoagulation Episode Summary     INR check location:       Preferred lab:       Send INR reminders to:   LATIA MEJIAS    Comments:         Anticoagulation Care Providers     Provider Role Specialty Phone number    Junie Golden MD Referring [facility] [phone]            See the Encounter Report to view Anticoagulation Flowsheet and Dosing Calendar (Go to Encounters tab in chart review, and find the Anticoagulation Therapy Visit)        Charley Francisco RN

## 2019-12-02 ENCOUNTER — ANTICOAGULATION THERAPY VISIT (OUTPATIENT)
Dept: NURSING | Facility: CLINIC | Age: 74
End: 2019-12-02
Payer: MEDICARE

## 2019-12-02 DIAGNOSIS — I48.0 PAF (PAROXYSMAL ATRIAL FIBRILLATION) (H): ICD-10-CM

## 2019-12-02 LAB — INR POINT OF CARE: 3.2 (ref 0.86–1.14)

## 2019-12-02 PROCEDURE — 85610 PROTHROMBIN TIME: CPT | Mod: QW

## 2019-12-02 PROCEDURE — 36416 COLLJ CAPILLARY BLOOD SPEC: CPT

## 2019-12-02 NOTE — PROGRESS NOTES
ANTICOAGULATION FOLLOW-UP CLINIC VISIT    Patient Name:  Rian Ness  Date:  12/2/2019  Contact Type:  Face to Face    SUBJECTIVE:  Patient Findings     Comments:   No concerns or changes today. Supra. Will adjust weekly dose.         Clinical Outcomes     Comments:   No concerns or changes today. Supra. Will adjust weekly dose.            OBJECTIVE    INR Protime   Date Value Ref Range Status   12/02/2019 3.2 (A) 0.86 - 1.14 Final       ASSESSMENT / PLAN  INR assessment SUPRA    Recheck INR In: 1 WEEK    INR Location Clinic      Anticoagulation Summary  As of 12/2/2019    INR goal:   2.0-3.0   TTR:   0.0 % (1 d)   INR used for dosing:   3.2! (12/2/2019)   Warfarin maintenance plan:   5 mg (5 mg x 1) every day   Full warfarin instructions:   12/2: 2.5 mg; Otherwise 5 mg every day   Weekly warfarin total:   35 mg   Plan last modified:   Charley Francisco RN (12/2/2019)   Next INR check:   12/9/2019   Priority:   High   Target end date:       Indications    PAF (paroxysmal atrial fibrillation) (H) [I48.0]             Anticoagulation Episode Summary     INR check location:       Preferred lab:       Send INR reminders to:   LATIA MEJISA    Comments:         Anticoagulation Care Providers     Provider Role Specialty Phone number    Junie Golden MD Referring [facility] [phone]            See the Encounter Report to view Anticoagulation Flowsheet and Dosing Calendar (Go to Encounters tab in chart review, and find the Anticoagulation Therapy Visit)        Charley Francisco RN

## 2019-12-06 ENCOUNTER — OFFICE VISIT (OUTPATIENT)
Dept: OPHTHALMOLOGY | Facility: CLINIC | Age: 74
End: 2019-12-06
Payer: MEDICARE

## 2019-12-06 DIAGNOSIS — Z01.00 EXAMINATION OF EYES AND VISION: ICD-10-CM

## 2019-12-06 DIAGNOSIS — Z96.1 PSEUDOPHAKIA: Primary | ICD-10-CM

## 2019-12-06 DIAGNOSIS — H43.813 POSTERIOR VITREOUS DETACHMENT, BILATERAL: ICD-10-CM

## 2019-12-06 DIAGNOSIS — H52.4 PRESBYOPIA: ICD-10-CM

## 2019-12-06 PROCEDURE — 92014 COMPRE OPH EXAM EST PT 1/>: CPT | Performed by: OPHTHALMOLOGY

## 2019-12-06 PROCEDURE — 92015 DETERMINE REFRACTIVE STATE: CPT | Mod: GY | Performed by: OPHTHALMOLOGY

## 2019-12-06 ASSESSMENT — REFRACTION_WEARINGRX
OD_ADD: +2.50
OS_SPHERE: -1.00
OD_CYLINDER: +1.25
OS_AXIS: 176
SPECS_TYPE: BIFOCAL
OS_CYLINDER: +1.25
OD_SPHERE: -0.75
OD_AXIS: 173
OS_ADD: +2.50

## 2019-12-06 ASSESSMENT — SLIT LAMP EXAM - LIDS
COMMENTS: 2+ DERMATOCHALASIS - UPPER LID
COMMENTS: 2+ DERMATOCHALASIS - UPPER LID

## 2019-12-06 ASSESSMENT — VISUAL ACUITY
METHOD: SNELLEN - LINEAR
CORRECTION_TYPE: GLASSES
OD_CC+: -2
OD_CC: 2
OD_CC: 20/25
OS_CC: 20/20
OS_CC: 2+

## 2019-12-06 ASSESSMENT — REFRACTION_MANIFEST
OD_ADD: +2.50
OD_CYLINDER: +1.50
OD_AXIS: 007
OS_SPHERE: -1.00
OS_ADD: +2.50
OD_SPHERE: -0.75
OS_CYLINDER: +1.50
OS_AXIS: 175

## 2019-12-06 ASSESSMENT — CONF VISUAL FIELD
OS_NORMAL: 1
OD_NORMAL: 1

## 2019-12-06 ASSESSMENT — TONOMETRY
OD_IOP_MMHG: 16
OS_IOP_MMHG: 17
IOP_METHOD: APPLANATION

## 2019-12-06 ASSESSMENT — CUP TO DISC RATIO
OD_RATIO: 0.2
OS_RATIO: 0.4

## 2019-12-06 ASSESSMENT — EXTERNAL EXAM - LEFT EYE: OS_EXAM: 2+ BROW PTOSIS, PROLAPSED FAT PADS: UPPER, LOWER

## 2019-12-06 ASSESSMENT — EXTERNAL EXAM - RIGHT EYE: OD_EXAM: 2+ BROW PTOSIS, PROLAPSED FAT PADS: UPPER, LOWER

## 2019-12-06 NOTE — PROGRESS NOTES
Current Eye Medications:  Over-the-counter readers both eyes every day as needed.      Subjective:  Comprehensive Eye Exam.  Vision is good in each eye.  Computer vision is best without correction.       Objective:  See Ophthalmology Exam.       Assessment:  Stable eye exam.      ICD-10-CM    1. Pseudophakia,ou Z96.1 EYE EXAM (SIMPLE-NONBILLABLE)   2. Examination of eyes and vision Z01.00 EYE EXAM (SIMPLE-NONBILLABLE)   3. Posterior vitreous detachment, bilateral H43.813    4. Presbyopia H52.4 REFRACTIVE STATUS        Plan:  Glasses Rx given - optional  Use artificial tears up to 4 times daily both eyes.  (Refresh Tears, Systane Ultra/Balance, or Theratears)  Possible clouding of posterior capsule both eyes discussed.  Call in August 2020 for an appointment in December 2020 for Complete Exam    Dr. Abebe [phone]

## 2019-12-06 NOTE — LETTER
12/6/2019         RE: Rian Ness  [address] [facility] [address]        Dear Colleague,    Thank you for referring your patient, Rian Ness, to the [facility]. Please see a copy of my visit note below.     Current Eye Medications:  Over-the-counter readers both eyes every day as needed.      Subjective:  Comprehensive Eye Exam.  Vision is good in each eye.  Computer vision is best without correction.       Objective:  See Ophthalmology Exam.       Assessment:  Stable eye exam.      ICD-10-CM    1. Pseudophakia,ou Z96.1 EYE EXAM (SIMPLE-NONBILLABLE)   2. Examination of eyes and vision Z01.00 EYE EXAM (SIMPLE-NONBILLABLE)   3. Posterior vitreous detachment, bilateral H43.813    4. Presbyopia H52.4 REFRACTIVE STATUS        Plan:  Glasses Rx given - optional  Use artificial tears up to 4 times daily both eyes.  (Refresh Tears, Systane Ultra/Balance, or Theratears)  Possible clouding of posterior capsule both eyes discussed.  Call in August 2020 for an appointment in December 2020 for Complete Exam    Dr. Abebe [phone]           Again, thank you for allowing me to participate in the care of your patient.        Sincerely,        Jared Abebe MD

## 2019-12-09 ENCOUNTER — ANTICOAGULATION THERAPY VISIT (OUTPATIENT)
Dept: NURSING | Facility: CLINIC | Age: 74
End: 2019-12-09
Payer: MEDICARE

## 2019-12-09 DIAGNOSIS — I48.0 PAF (PAROXYSMAL ATRIAL FIBRILLATION) (H): ICD-10-CM

## 2019-12-09 LAB — INR POINT OF CARE: 3.9 (ref 0.86–1.14)

## 2019-12-09 PROCEDURE — 36416 COLLJ CAPILLARY BLOOD SPEC: CPT

## 2019-12-09 PROCEDURE — 85610 PROTHROMBIN TIME: CPT | Mod: QW

## 2019-12-09 NOTE — PROGRESS NOTES
ANTICOAGULATION FOLLOW-UP CLINIC VISIT    Patient Name:  Rian Ness  Date:  12/9/2019  Contact Type:  Face to Face    SUBJECTIVE:  Patient Findings     Comments:   No concerns. Diet same. Dose adjusted today and weekly.         Clinical Outcomes     Comments:   No concerns. Diet same. Dose adjusted today and weekly.            OBJECTIVE    INR Protime   Date Value Ref Range Status   12/09/2019 3.9 (A) 0.86 - 1.14 Final       ASSESSMENT / PLAN  INR assessment SUPRA    Recheck INR In: 1 WEEK    INR Location Clinic      Anticoagulation Summary  As of 12/9/2019    INR goal:   2.0-3.0   TTR:   0.0 % (1.1 wk)   INR used for dosing:   3.9! (12/9/2019)   Warfarin maintenance plan:   2.5 mg (5 mg x 0.5) every Mon, Thu; 5 mg (5 mg x 1) all other days   Full warfarin instructions:   12/9: Hold; 12/12: 2.5 mg; Otherwise 2.5 mg every Mon, Thu; 5 mg all other days   Weekly warfarin total:   30 mg   Plan last modified:   Charley Francisco RN (12/9/2019)   Next INR check:   12/16/2019   Priority:   High   Target end date:       Indications    PAF (paroxysmal atrial fibrillation) (H) [I48.0]             Anticoagulation Episode Summary     INR check location:       Preferred lab:       Send INR reminders to:   LATIA MEJIAS    Comments:         Anticoagulation Care Providers     Provider Role Specialty Phone number    Junie Golden MD Referring [facility] [phone]            See the Encounter Report to view Anticoagulation Flowsheet and Dosing Calendar (Go to Encounters tab in chart review, and find the Anticoagulation Therapy Visit)        Charley Francisco RN

## 2019-12-16 ENCOUNTER — ANTICOAGULATION THERAPY VISIT (OUTPATIENT)
Dept: NURSING | Facility: CLINIC | Age: 74
End: 2019-12-16
Payer: MEDICARE

## 2019-12-16 DIAGNOSIS — I48.0 PAF (PAROXYSMAL ATRIAL FIBRILLATION) (H): ICD-10-CM

## 2019-12-16 LAB — INR POINT OF CARE: 2.8 (ref 0.86–1.14)

## 2019-12-16 PROCEDURE — 99207 ZZC NO CHARGE NURSE ONLY: CPT

## 2019-12-16 PROCEDURE — 36416 COLLJ CAPILLARY BLOOD SPEC: CPT

## 2019-12-16 PROCEDURE — 85610 PROTHROMBIN TIME: CPT | Mod: QW

## 2019-12-16 NOTE — PROGRESS NOTES
ANTICOAGULATION FOLLOW-UP CLINIC VISIT    Patient Name:  Rian Ness  Date:  2019  Contact Type:  Face to Face    SUBJECTIVE:  Patient Findings     Comments:   Therapeutic with dose change. Continue same.         Clinical Outcomes     Negatives:   Major bleeding event, Thromboembolic event, Anticoagulation-related hospital admission, Anticoagulation-related ED visit, Anticoagulation-related fatality    Comments:   Therapeutic with dose change. Continue same.            OBJECTIVE    INR Protime   Date Value Ref Range Status   2019 2.8 (A) 0.86 - 1.14 Final       ASSESSMENT / PLAN  INR assessment THER    Recheck INR In: 1 WEEK    INR Location Clinic      Anticoagulation Summary  As of 2019    INR goal:   2.0-3.0   TTR:   8.5 % (2.1 wk)   INR used for dosin.8 (2019)   Warfarin maintenance plan:   2.5 mg (5 mg x 0.5) every Mon, Wed, Fri; 5 mg (5 mg x 1) all other days   Full warfarin instructions:   2.5 mg every Mon, Wed, Fri; 5 mg all other days   Weekly warfarin total:   27.5 mg   Plan last modified:   Charley Francisco RN (2019)   Next INR check:   2019   Priority:   High   Target end date:       Indications    PAF (paroxysmal atrial fibrillation) (H) [I48.0]             Anticoagulation Episode Summary     INR check location:       Preferred lab:       Send INR reminders to:   LATIA MEJIAS    Comments:         Anticoagulation Care Providers     Provider Role Specialty Phone number    Junie Golden MD Referring [facility] [phone]            See the Encounter Report to view Anticoagulation Flowsheet and Dosing Calendar (Go to Encounters tab in chart review, and find the Anticoagulation Therapy Visit)        Charley Francisco RN

## 2019-12-18 ENCOUNTER — ANCILLARY PROCEDURE (OUTPATIENT)
Dept: GENERAL RADIOLOGY | Facility: CLINIC | Age: 74
End: 2019-12-18
Attending: FAMILY MEDICINE
Payer: MEDICARE

## 2019-12-18 ENCOUNTER — OFFICE VISIT (OUTPATIENT)
Dept: ORTHOPEDICS | Facility: CLINIC | Age: 74
End: 2019-12-18
Payer: MEDICARE

## 2019-12-18 VITALS
DIASTOLIC BLOOD PRESSURE: 82 MMHG | BODY MASS INDEX: 31.98 KG/M2 | HEIGHT: 68 IN | WEIGHT: 211 LBS | SYSTOLIC BLOOD PRESSURE: 131 MMHG

## 2019-12-18 DIAGNOSIS — M25.531 RIGHT WRIST PAIN: ICD-10-CM

## 2019-12-18 DIAGNOSIS — M25.531 RIGHT WRIST PAIN: Primary | ICD-10-CM

## 2019-12-18 PROCEDURE — 73110 X-RAY EXAM OF WRIST: CPT | Mod: RT

## 2019-12-18 PROCEDURE — 99214 OFFICE O/P EST MOD 30 MIN: CPT | Performed by: FAMILY MEDICINE

## 2019-12-18 ASSESSMENT — MIFFLIN-ST. JEOR: SCORE: 1661.59

## 2019-12-18 NOTE — LETTER
2019         RE: Rian Ness  [address] [facility] [address]        Dear Colleague,    Thank you for referring your patient, Rian Ness, to the [facility] [place]. Please see a copy of my visit note below.    Rian Ness  :  1945  DOS: 2019  MRN: [id number]    Sports Medicine Clinic Visit    PCP: Junie Golden    Rian Ness is a 74 year old Right hand dominant male who is seen as an AIC patient presenting with acute right wrist pain and swelling.    Injury: Insidious onset of right wrist pain and swelling, that initially noted after mousing for 1 - 2 hours yesterday.  Pain located over right radial, dorsal wrist, nonradiating.  Additional Features:  Positive: swelling and weakness.  Symptoms are better with Rest.  Symptoms are worse with: mousing/typing, grasping, moving wrist, lifting.  Other evaluation and/or treatments so far consists of: No Treatment tried to date.  Recent imaging completed: No recent imaging completed.  Prior History of related problems: none    Social History: retired    Review of Systems  Musculoskeletal: as above  Remainder of review of systems is negative including constitutional, CV, pulmonary, GI, Skin and Neurologic except as noted in HPI or medical history.    Past Medical History:   Diagnosis Date     Arthritis      Cancer (H)      Depressive disorder      HTN      Past Surgical History:   Procedure Laterality Date     ABDOMEN SURGERY       ARTHROSCOPY KNEE RT/LT      right     ARTHROSCOPY KNEE RT/LT      left     C TOTAL KNEE ARTHROPLASTY  10/11    right knee     CATARACT IOL, RT/LT       COLONOSCOPY       PHACOEMULSIFICATION WITH STANDARD INTRAOCULAR LENS IMPLANT  2010; 2010    left eye; right eye     SUPRAPUBIC PROSTATECTOMY       VASCULAR SURGERY       Family History   Problem Relation Age of Onset     Cancer Mother         leukemia     Depression Other      Depression  "Other      Prostate Cancer Brother      Prostate Cancer Brother      Prostate Cancer Brother          Objective  /82   Ht 1.727 m (5' 8\")   Wt 95.7 kg (211 lb)   BMI 32.08 kg/m         General: healthy, alert and in no distress      HEENT: no scleral icterus or conjunctival erythema     Skin: no suspicious lesions or rash. No jaundice.     CV: regular rhythm by palpation, 2+ distal pulses, no pedal edema      Resp: normal respiratory effort without conversational dyspnea     Psych: normal mood and affect      Gait: nonantalgic, appropriate coordination and balance     Neuro: normal light touch sensory exam of the extremities. Motor strength as noted below     Right Wrist and Hand exam    Inspection:       Swelling: over generalized wrist    Tender:       Broadly across the RC joint, and the dorsal wrist and finger extensors tendons more mildly    Non Tender:       Remainder of the Wrist and Hand right    ROM:       Decreased active and passive ROM of the right Wrist with flexion, extension, radial deviation and ulnar deviation     Strength:       Motor function intact    Neurovascular:       2+ radial pulses bilaterally with brisk capillary refill and      normal sensation to light touch in the radial, median and ulnar nerve distributions      Radiology:  XR images independently visualized and reviewed with patient today in clinic  No clear acute findings, some DJD present in 1st CMC and STT joints  Will follow final radiology read    Assessment:  1. Right wrist pain        Plan:  Discussed the assessment with the patient.  Follow up: 2 weeks if no improvement, sooner prn for worsening sx  Given acuity prefer to avoid injection today  Significant pain and swelling across the wrist joint, suspect mild OA flare  Cannot completely r/o gout, low suspicion for infection  Wrist brace provided which was much more comfortable  Ice therapy and antiinflammatory options reviewed  Consider add'l imaging or procedure, " vs OT, based on clinical progress  If pain resolves he can cancel f/u  XR images independently visualized and reviewed with patient today in clinic  Home handouts provided and supportive care reviewed  All questions were answered today  Contact us with additional questions or concerns  Signs and sx of concern reviewed      Eriberto Batista DO, JUAN  Primary Care Sports Medicine  [facility]                 Disclaimer: This note consists of symbols derived from keyboarding, dictation and/or voice recognition software. As a result, there may be errors in the script that have gone undetected. Please consider this when interpreting information found in this chart.    Again, thank you for allowing me to participate in the care of your patient.        Sincerely,        Eriberto Batista DO

## 2019-12-23 ENCOUNTER — MYC REFILL (OUTPATIENT)
Dept: FAMILY MEDICINE | Facility: CLINIC | Age: 74
End: 2019-12-23

## 2019-12-23 ENCOUNTER — ANTICOAGULATION THERAPY VISIT (OUTPATIENT)
Dept: NURSING | Facility: CLINIC | Age: 74
End: 2019-12-23
Payer: MEDICARE

## 2019-12-23 DIAGNOSIS — I48.91 NEW ONSET ATRIAL FIBRILLATION (H): ICD-10-CM

## 2019-12-23 DIAGNOSIS — I48.0 PAF (PAROXYSMAL ATRIAL FIBRILLATION) (H): ICD-10-CM

## 2019-12-23 LAB — INR POINT OF CARE: 2.1 (ref 0.86–1.14)

## 2019-12-23 PROCEDURE — 36416 COLLJ CAPILLARY BLOOD SPEC: CPT

## 2019-12-23 PROCEDURE — 85610 PROTHROMBIN TIME: CPT | Mod: QW

## 2019-12-23 NOTE — PROGRESS NOTES
ANTICOAGULATION FOLLOW-UP CLINIC VISIT    Patient Name:  Rian Ness  Date:  2019  Contact Type:  Face to Face    SUBJECTIVE:  Patient Findings     Comments:   The patient was assessed for diet, medication, and activity level changes, missed or extra doses, bruising or bleeding, with no problem findings.          Clinical Outcomes     Comments:   The patient was assessed for diet, medication, and activity level changes, missed or extra doses, bruising or bleeding, with no problem findings.             OBJECTIVE    INR Protime   Date Value Ref Range Status   2019 2.1 (A) 0.86 - 1.14 Final       ASSESSMENT / PLAN  INR assessment THER    Recheck INR In: 2 WEEKS    INR Location Clinic      Anticoagulation Summary  As of 2019    INR goal:   2.0-3.0   TTR:   37.6 % (3.1 wk)   INR used for dosin.1 (2019)   Warfarin maintenance plan:   2.5 mg (5 mg x 0.5) every Mon, Wed, Fri; 5 mg (5 mg x 1) all other days   Full warfarin instructions:   2.5 mg every Mon, Wed, Fri; 5 mg all other days   Weekly warfarin total:   27.5 mg   No change documented:   Charley Francisco RN   Plan last modified:   Charley Francisco RN (2019)   Next INR check:   2020   Priority:   High   Target end date:       Indications    PAF (paroxysmal atrial fibrillation) (H) [I48.0]             Anticoagulation Episode Summary     INR check location:       Preferred lab:       Send INR reminders to:   LATIA MEJIAS    Comments:         Anticoagulation Care Providers     Provider Role Specialty Phone number    Junie Golden MD Referring [facility] [phone]            See the Encounter Report to view Anticoagulation Flowsheet and Dosing Calendar (Go to Encounters tab in chart review, and find the Anticoagulation Therapy Visit)        Charley Francisco RN

## 2019-12-24 RX ORDER — WARFARIN SODIUM 5 MG/1
5 TABLET ORAL DAILY
Qty: 90 TABLET | Refills: 0 | Status: SHIPPED | OUTPATIENT
Start: 2019-12-24 | End: 2020-03-17

## 2019-12-26 ENCOUNTER — MYC REFILL (OUTPATIENT)
Dept: FAMILY MEDICINE | Facility: CLINIC | Age: 74
End: 2019-12-26

## 2019-12-26 DIAGNOSIS — I48.91 NEW ONSET ATRIAL FIBRILLATION (H): ICD-10-CM

## 2019-12-26 RX ORDER — WARFARIN SODIUM 5 MG/1
5 TABLET ORAL DAILY
Qty: 90 TABLET | Refills: 0 | Status: CANCELLED | OUTPATIENT
Start: 2019-12-26

## 2020-01-06 ENCOUNTER — ANTICOAGULATION THERAPY VISIT (OUTPATIENT)
Dept: NURSING | Facility: CLINIC | Age: 75
End: 2020-01-06
Payer: MEDICARE

## 2020-01-06 DIAGNOSIS — I48.0 PAF (PAROXYSMAL ATRIAL FIBRILLATION) (H): ICD-10-CM

## 2020-01-06 LAB — INR POINT OF CARE: 1.6 (ref 0.86–1.14)

## 2020-01-06 PROCEDURE — 36416 COLLJ CAPILLARY BLOOD SPEC: CPT

## 2020-01-06 PROCEDURE — 85610 PROTHROMBIN TIME: CPT | Mod: QW

## 2020-01-06 NOTE — PROGRESS NOTES
ANTICOAGULATION FOLLOW-UP CLINIC VISIT    Patient Name:  Rian Ness  Date:  2020  Contact Type:  Face to Face    SUBJECTIVE:  Patient Findings     Comments:   Patient denies any identifiable changes that caused the subtherapeutic INR. Trending down. Weekly dose increased.           Clinical Outcomes     Negatives:   Major bleeding event, Thromboembolic event, Anticoagulation-related hospital admission, Anticoagulation-related ED visit, Anticoagulation-related fatality    Comments:   Patient denies any identifiable changes that caused the subtherapeutic INR. Trending down. Weekly dose increased.              OBJECTIVE    INR Protime   Date Value Ref Range Status   2020 1.6 (A) 0.86 - 1.14 Final       ASSESSMENT / PLAN  INR assessment SUB    Recheck INR In: 2 WEEKS    INR Location Clinic      Anticoagulation Summary  As of 2020    INR goal:   2.0-3.0   TTR:   30.8 % (1.2 mo)   INR used for dosin.6! (2020)   Warfarin maintenance plan:   2.5 mg (5 mg x 0.5) every Wed, Fri; 5 mg (5 mg x 1) all other days   Full warfarin instructions:   2.5 mg every Wed, Fri; 5 mg all other days   Weekly warfarin total:   30 mg   Plan last modified:   Charley Francisco RN (2020)   Next INR check:   2020   Priority:   High   Target end date:       Indications    PAF (paroxysmal atrial fibrillation) (H) [I48.0]             Anticoagulation Episode Summary     INR check location:       Preferred lab:       Send INR reminders to:   LATIA MEJIAS    Comments:         Anticoagulation Care Providers     Provider Role Specialty Phone number    Junie Golden MD Referring [facility] [phone]            See the Encounter Report to view Anticoagulation Flowsheet and Dosing Calendar (Go to Encounters tab in chart review, and find the Anticoagulation Therapy Visit)        Charley Francisco RN

## 2020-01-20 ENCOUNTER — ANTICOAGULATION THERAPY VISIT (OUTPATIENT)
Dept: NURSING | Facility: CLINIC | Age: 75
End: 2020-01-20
Payer: MEDICARE

## 2020-01-20 DIAGNOSIS — I48.0 PAF (PAROXYSMAL ATRIAL FIBRILLATION) (H): ICD-10-CM

## 2020-01-20 LAB — INR POINT OF CARE: 1.9 (ref 0.86–1.14)

## 2020-01-20 PROCEDURE — 85610 PROTHROMBIN TIME: CPT | Mod: QW

## 2020-01-20 PROCEDURE — 36416 COLLJ CAPILLARY BLOOD SPEC: CPT

## 2020-01-20 NOTE — PROGRESS NOTES
ANTICOAGULATION FOLLOW-UP CLINIC VISIT    Patient Name:  Rian Ness  Date:  2020  Contact Type:  Face to Face    SUBJECTIVE:  Patient Findings     Comments:   Still sub. No missed dose or medication or diet changes.Will adjust weekly dose again.         Clinical Outcomes     Negatives:   Major bleeding event, Thromboembolic event, Anticoagulation-related hospital admission, Anticoagulation-related ED visit, Anticoagulation-related fatality    Comments:   Still sub. No missed dose or medication or diet changes.Will adjust weekly dose again.            OBJECTIVE    INR Protime   Date Value Ref Range Status   2020 1.9 (A) 0.86 - 1.14 Final       ASSESSMENT / PLAN  INR assessment SUB    Recheck INR In: 2 WEEKS    INR Location Clinic      Anticoagulation Summary  As of 2020    INR goal:   2.0-3.0   TTR:   22.1 % (1.7 mo)   INR used for dosin.9! (2020)   Warfarin maintenance plan:   2.5 mg (5 mg x 0.5) every Fri; 5 mg (5 mg x 1) all other days   Full warfarin instructions:   2.5 mg every Fri; 5 mg all other days   Weekly warfarin total:   32.5 mg   Plan last modified:   Charley Francisco RN (2020)   Next INR check:   2/3/2020   Priority:   High   Target end date:       Indications    PAF (paroxysmal atrial fibrillation) (H) [I48.0]             Anticoagulation Episode Summary     INR check location:       Preferred lab:       Send INR reminders to:   LATIA MEJIAS    Comments:         Anticoagulation Care Providers     Provider Role Specialty Phone number    Junie Golden MD Referring [facility] [phone]            See the Encounter Report to view Anticoagulation Flowsheet and Dosing Calendar (Go to Encounters tab in chart review, and find the Anticoagulation Therapy Visit)    Dosage adjustment made based on physician directed care plan.    Charley Francisco RN

## 2020-02-03 ENCOUNTER — ANTICOAGULATION THERAPY VISIT (OUTPATIENT)
Dept: NURSING | Facility: CLINIC | Age: 75
End: 2020-02-03
Payer: MEDICARE

## 2020-02-03 DIAGNOSIS — I48.0 PAF (PAROXYSMAL ATRIAL FIBRILLATION) (H): ICD-10-CM

## 2020-02-03 LAB — INR POINT OF CARE: 1.9 (ref 0.86–1.14)

## 2020-02-03 PROCEDURE — 85610 PROTHROMBIN TIME: CPT | Mod: QW

## 2020-02-03 PROCEDURE — 36416 COLLJ CAPILLARY BLOOD SPEC: CPT

## 2020-02-03 NOTE — PROGRESS NOTES
ANTICOAGULATION FOLLOW-UP CLINIC VISIT    Patient Name:  Rian Ness  Date:  2/3/2020  Contact Type:  Face to Face    SUBJECTIVE:  Patient Findings     Comments:   Reports was taking tylenol twice a day and stopped his in past 2 wks. No other change in diet or meds.         Clinical Outcomes     Negatives:   Major bleeding event, Thromboembolic event, Anticoagulation-related hospital admission, Anticoagulation-related ED visit, Anticoagulation-related fatality    Comments:   Reports was taking tylenol twice a day and stopped his in past 2 wks. No other change in diet or meds.            OBJECTIVE    INR Protime   Date Value Ref Range Status   2020 1.9 (A) 0.86 - 1.14 Final       ASSESSMENT / PLAN  INR assessment SUB    Recheck INR In: 2 WEEKS    INR Location Clinic      Anticoagulation Summary  As of 2/3/2020    INR goal:   2.0-3.0   TTR:   17.3 % (2.1 mo)   INR used for dosin.9! (2/3/2020)   Warfarin maintenance plan:   5 mg (5 mg x 1) every day   Full warfarin instructions:   5 mg every day   Weekly warfarin total:   35 mg   Plan last modified:   Charley Francisco RN (2/3/2020)   Next INR check:   2020   Priority:   High   Target end date:       Indications    PAF (paroxysmal atrial fibrillation) (H) [I48.0]             Anticoagulation Episode Summary     INR check location:       Preferred lab:       Send INR reminders to:   LATIA MEJIAS    Comments:         Anticoagulation Care Providers     Provider Role Specialty Phone number    Junie Golden MD Referring [facility] [phone]            See the Encounter Report to view Anticoagulation Flowsheet and Dosing Calendar (Go to Encounters tab in chart review, and find the Anticoagulation Therapy Visit)        Charley Francisco RN

## 2020-02-17 ENCOUNTER — ANTICOAGULATION THERAPY VISIT (OUTPATIENT)
Dept: NURSING | Facility: CLINIC | Age: 75
End: 2020-02-17
Payer: MEDICARE

## 2020-02-17 DIAGNOSIS — I48.0 PAF (PAROXYSMAL ATRIAL FIBRILLATION) (H): ICD-10-CM

## 2020-02-17 LAB — INR POINT OF CARE: 2.2 (ref 0.86–1.14)

## 2020-02-17 PROCEDURE — 85610 PROTHROMBIN TIME: CPT | Mod: QW

## 2020-02-17 PROCEDURE — 36416 COLLJ CAPILLARY BLOOD SPEC: CPT

## 2020-02-17 NOTE — PROGRESS NOTES
ANTICOAGULATION FOLLOW-UP CLINIC VISIT    Patient Name:  Rian Ness  Date:  2020  Contact Type:  Face to Face    SUBJECTIVE:  Patient Findings     Comments:   Therapeutic with dose increase. Continue same and recheck in 2 wks.         Clinical Outcomes     Negatives:   Major bleeding event, Thromboembolic event, Anticoagulation-related hospital admission, Anticoagulation-related ED visit, Anticoagulation-related fatality    Comments:   Therapeutic with dose increase. Continue same and recheck in 2 wks.            OBJECTIVE    INR Protime   Date Value Ref Range Status   2020 2.2 (A) 0.86 - 1.14 Final       ASSESSMENT / PLAN  INR assessment THER    Recheck INR In: 2 WEEKS    INR Location Clinic      Anticoagulation Summary  As of 2020    INR goal:   2.0-3.0   TTR:   26.2 % (2.6 mo)   INR used for dosin.2 (2020)   Warfarin maintenance plan:   5 mg (5 mg x 1) every day   Full warfarin instructions:   5 mg every day   Weekly warfarin total:   35 mg   No change documented:   Charley Francisco RN   Plan last modified:   Charley Francisco RN (2/3/2020)   Next INR check:   3/2/2020   Priority:   High   Target end date:       Indications    PAF (paroxysmal atrial fibrillation) (H) [I48.0]             Anticoagulation Episode Summary     INR check location:       Preferred lab:       Send INR reminders to:   LATIA MEJIAS    Comments:         Anticoagulation Care Providers     Provider Role Specialty Phone number    Junie Golden MD [facility] [phone]            See the Encounter Report to view Anticoagulation Flowsheet and Dosing Calendar (Go to Encounters tab in chart review, and find the Anticoagulation Therapy Visit)        Charley Francisco RN

## 2020-03-02 ENCOUNTER — ANTICOAGULATION THERAPY VISIT (OUTPATIENT)
Dept: NURSING | Facility: CLINIC | Age: 75
End: 2020-03-02
Payer: MEDICARE

## 2020-03-02 DIAGNOSIS — I48.0 PAF (PAROXYSMAL ATRIAL FIBRILLATION) (H): ICD-10-CM

## 2020-03-02 LAB — INR POINT OF CARE: 2.2 (ref 0.86–1.14)

## 2020-03-02 PROCEDURE — 85610 PROTHROMBIN TIME: CPT | Mod: QW

## 2020-03-02 PROCEDURE — 36416 COLLJ CAPILLARY BLOOD SPEC: CPT

## 2020-03-02 NOTE — PROGRESS NOTES
ANTICOAGULATION FOLLOW-UP CLINIC VISIT    Patient Name:  Rian Ness  Date:  3/2/2020  Contact Type:  Face to Face    SUBJECTIVE:  Patient Findings     Comments:   The patient was assessed for diet, medication, and activity level changes, missed or extra doses, bruising or bleeding, with no problem findings.  Danay DENNISON, RN, CPN          Clinical Outcomes     Negatives:   Major bleeding event, Thromboembolic event, Anticoagulation-related hospital admission, Anticoagulation-related ED visit, Anticoagulation-related fatality    Comments:   The patient was assessed for diet, medication, and activity level changes, missed or extra doses, bruising or bleeding, with no problem findings.  Danay DENNISON, RN, CPN             OBJECTIVE    INR Protime   Date Value Ref Range Status   2020 2.2 (A) 0.86 - 1.14 Final       ASSESSMENT / PLAN  INR assessment THER    Recheck INR In: 2 WEEKS    INR Location Clinic      Anticoagulation Summary  As of 3/2/2020    INR goal:   2.0-3.0   TTR:   37.4 % (3.1 mo)   INR used for dosin.2 (3/2/2020)   Warfarin maintenance plan:   5 mg (5 mg x 1) every day   Full warfarin instructions:   5 mg every day   Weekly warfarin total:   35 mg   No change documented:   Danay Kohli RN   Plan last modified:   Charley Francisco RN (2/3/2020)   Next INR check:   3/16/2020   Priority:   High   Target end date:       Indications    PAF (paroxysmal atrial fibrillation) (H) [I48.0]             Anticoagulation Episode Summary     INR check location:       Preferred lab:       Send INR reminders to:   LATIA MEJIAS    Comments:         Anticoagulation Care Providers     Provider Role Specialty Phone number    Junie Golden MD Referring [facility] [phone]            See the Encounter Report to view Anticoagulation Flowsheet and Dosing Calendar (Go to Encounters tab in chart review, and find the Anticoagulation Therapy Visit)        Danay Kohli RN

## 2020-03-17 ENCOUNTER — TELEPHONE (OUTPATIENT)
Dept: FAMILY MEDICINE | Facility: CLINIC | Age: 75
End: 2020-03-17

## 2020-03-17 DIAGNOSIS — I48.91 NEW ONSET ATRIAL FIBRILLATION (H): ICD-10-CM

## 2020-03-17 NOTE — TELEPHONE ENCOUNTER
Pt is requesting refill on Warfarin 5mg to be sent to [facility] pharmacy. I see a refill was put in 3/17 but sent to [facility]. Thank you.

## 2020-03-18 RX ORDER — WARFARIN SODIUM 5 MG/1
5 TABLET ORAL DAILY
Qty: 90 TABLET | Refills: 0 | Status: SHIPPED | OUTPATIENT
Start: 2020-03-18 | End: 2020-05-14

## 2020-03-18 NOTE — TELEPHONE ENCOUNTER
Per patient, requests Warfarin prescription be sent to [facility]  Patient informed prescription has been sent to [facility] pharmacy.  Patient will follow up with [facility] and tell them to disregard the Warfarin prescription.  Patient/parent verbalized understanding of instructions provided and agreed with the plan of care  Lluvia Corey RN

## 2020-03-20 ENCOUNTER — DOCUMENTATION ONLY (OUTPATIENT)
Dept: LAB | Facility: CLINIC | Age: 75
End: 2020-03-20

## 2020-03-20 DIAGNOSIS — I10 HYPERTENSION GOAL BP (BLOOD PRESSURE) < 140/90: Primary | ICD-10-CM

## 2020-03-20 NOTE — PROGRESS NOTES
Please review lab orders sign and close encounter.  Lab appt. 03/30/2020 also has an essential INR visit same day.  SARAI Stephens

## 2020-03-20 NOTE — PROGRESS NOTES
Your patient has a lab appointment on 03/30/20 for pre-visit labs. At this time there are no orders placed for there lab appointment. Please review and sign orders prior to there appointment time. Thank you.    AN Lab    Ele Motley CMA (Lab)

## 2020-03-30 ENCOUNTER — DOCUMENTATION ONLY (OUTPATIENT)
Dept: FAMILY MEDICINE | Facility: CLINIC | Age: 75
End: 2020-03-30

## 2020-03-30 ENCOUNTER — ANTICOAGULATION THERAPY VISIT (OUTPATIENT)
Dept: NURSING | Facility: CLINIC | Age: 75
End: 2020-03-30
Payer: MEDICARE

## 2020-03-30 DIAGNOSIS — I48.0 PAF (PAROXYSMAL ATRIAL FIBRILLATION) (H): ICD-10-CM

## 2020-03-30 DIAGNOSIS — I48.0 PAF (PAROXYSMAL ATRIAL FIBRILLATION) (H): Primary | ICD-10-CM

## 2020-03-30 DIAGNOSIS — I10 HYPERTENSION GOAL BP (BLOOD PRESSURE) < 140/90: ICD-10-CM

## 2020-03-30 LAB
ANION GAP SERPL CALCULATED.3IONS-SCNC: 6 MMOL/L (ref 3–14)
BUN SERPL-MCNC: 20 MG/DL (ref 7–30)
CALCIUM SERPL-MCNC: 8.7 MG/DL (ref 8.5–10.1)
CHLORIDE SERPL-SCNC: 105 MMOL/L (ref 94–109)
CO2 SERPL-SCNC: 29 MMOL/L (ref 20–32)
CREAT SERPL-MCNC: 1 MG/DL (ref 0.66–1.25)
GFR SERPL CREATININE-BSD FRML MDRD: 73 ML/MIN/{1.73_M2}
GLUCOSE SERPL-MCNC: 134 MG/DL (ref 70–99)
INR PPP: 2 (ref 0.86–1.14)
POTASSIUM SERPL-SCNC: 4.2 MMOL/L (ref 3.4–5.3)
SODIUM SERPL-SCNC: 140 MMOL/L (ref 133–144)

## 2020-03-30 PROCEDURE — 99207 ZZC NO CHARGE NURSE ONLY: CPT

## 2020-03-30 PROCEDURE — 80048 BASIC METABOLIC PNL TOTAL CA: CPT | Performed by: FAMILY MEDICINE

## 2020-03-30 PROCEDURE — 36415 COLL VENOUS BLD VENIPUNCTURE: CPT | Performed by: FAMILY MEDICINE

## 2020-03-30 PROCEDURE — 85610 PROTHROMBIN TIME: CPT | Performed by: FAMILY MEDICINE

## 2020-03-30 NOTE — PROGRESS NOTES
ANTICOAGULATION FOLLOW-UP CLINIC VISIT    Patient Name:  Rian Ness  Date:  3/30/2020  Contact Type:  Telephone    SUBJECTIVE:  Patient Findings     Comments:   Therapeutic. Discussed result with patient on phone. No concerns. Missed a dose about 3 weeks ago. Continue same dose.         Clinical Outcomes     Negatives:   Major bleeding event, Thromboembolic event, Anticoagulation-related hospital admission, Anticoagulation-related ED visit, Anticoagulation-related fatality    Comments:   Therapeutic. Discussed result with patient on phone. No concerns. Missed a dose about 3 weeks ago. Continue same dose.            OBJECTIVE    INR   Date Value Ref Range Status   2020 2.00 (H) 0.86 - 1.14 Final     Comment:     This test is intended for monitoring Coumadin therapy.  Results are not   accurate in patients with prolonged INR due to factor deficiency.         ASSESSMENT / PLAN  INR assessment THER    Recheck INR In: 4 WEEKS    INR Location Clinic      Anticoagulation Summary  As of 3/30/2020    INR goal:   2.0-3.0   TTR:   52.2 % (4 mo)   INR used for dosin.00 (3/30/2020)   Warfarin maintenance plan:   5 mg (5 mg x 1) every day   Full warfarin instructions:   5 mg every day   Weekly warfarin total:   35 mg   No change documented:   Chraley Francisco RN   Plan last modified:   Charley Francisco RN (2/3/2020)   Next INR check:   2020   Priority:   High   Target end date:       Indications    PAF (paroxysmal atrial fibrillation) (H) [I48.0]             Anticoagulation Episode Summary     INR check location:       Preferred lab:       Send INR reminders to:   LATIA MEJIAS    Comments:         Anticoagulation Care Providers     Provider Role Specialty Phone number    Junie Golden MD Referring [facility] [phone]            See the Encounter Report to view Anticoagulation Flowsheet and Dosing Calendar (Go to Encounters tab in chart review, and find the Anticoagulation Therapy  Visit)        Charley Francisco RN

## 2020-04-24 ENCOUNTER — TELEPHONE (OUTPATIENT)
Dept: LAB | Facility: CLINIC | Age: 75
End: 2020-04-24

## 2020-04-24 NOTE — TELEPHONE ENCOUNTER
.Left message for patient to call in regards to 24 hour pre-appointment COVID screening. Patient has an appointment at [place] [facility] on 4/27/2020. Please ask infection screening questions and update appointment notes on [place]  lab schedule.    .Lisa Cosby on 4/24/2020 at 8:29 AM

## 2020-04-27 ENCOUNTER — ANTICOAGULATION THERAPY VISIT (OUTPATIENT)
Dept: NURSING | Facility: CLINIC | Age: 75
End: 2020-04-27

## 2020-04-27 DIAGNOSIS — I48.0 PAF (PAROXYSMAL ATRIAL FIBRILLATION) (H): ICD-10-CM

## 2020-04-27 LAB
CAPILLARY BLOOD COLLECTION: NORMAL
INR PPP: 2.7 (ref 0.86–1.14)

## 2020-04-27 PROCEDURE — 36416 COLLJ CAPILLARY BLOOD SPEC: CPT | Performed by: FAMILY MEDICINE

## 2020-04-27 PROCEDURE — 99207 ZZC NO CHARGE NURSE ONLY: CPT

## 2020-04-27 PROCEDURE — 85610 PROTHROMBIN TIME: CPT | Performed by: FAMILY MEDICINE

## 2020-04-27 NOTE — PROGRESS NOTES
ANTICOAGULATION FOLLOW-UP CLINIC VISIT    Patient Name:  Rian Ness  Date:  2020  Contact Type:  Telephone    SUBJECTIVE:  Patient Findings     Comments:   Therapeutic. Discussed with patient. The patient was assessed for diet, medication, and activity level changes, missed or extra doses, bruising or bleeding, with no problem findings. Continue same.         Clinical Outcomes     Negatives:   Major bleeding event, Thromboembolic event, Anticoagulation-related hospital admission, Anticoagulation-related ED visit, Anticoagulation-related fatality    Comments:   Therapeutic. Discussed with patient. The patient was assessed for diet, medication, and activity level changes, missed or extra doses, bruising or bleeding, with no problem findings. Continue same.            OBJECTIVE    INR   Date Value Ref Range Status   2020 2.70 (H) 0.86 - 1.14 Final     Comment:     This test is intended for monitoring Coumadin therapy.  Results are not   accurate in patients with prolonged INR due to factor deficiency.         ASSESSMENT / PLAN  INR assessment THER    Recheck INR In: 5 WEEKS    INR Location Clinic      Anticoagulation Summary  As of 2020    INR goal:   2.0-3.0   TTR:   61.2 % (4.9 mo)   INR used for dosin.70 (2020)   Warfarin maintenance plan:   5 mg (5 mg x 1) every day   Full warfarin instructions:   5 mg every day   Weekly warfarin total:   35 mg   No change documented:   Charley Francisco RN   Plan last modified:   Charley Francisco RN (2/3/2020)   Next INR check:   2020   Priority:   High   Target end date:       Indications    PAF (paroxysmal atrial fibrillation) (H) [I48.0]             Anticoagulation Episode Summary     INR check location:       Preferred lab:       Send INR reminders to:   LATIA MEJIAS    Comments:         Anticoagulation Care Providers     Provider Role Specialty Phone number    Junie Golden MD Referring [facility] Practice [phone]             See the Encounter Report to view Anticoagulation Flowsheet and Dosing Calendar (Go to Encounters tab in chart review, and find the Anticoagulation Therapy Visit)        Charley Francisco RN

## 2020-05-05 ENCOUNTER — VIRTUAL VISIT (OUTPATIENT)
Dept: CARDIOLOGY | Facility: CLINIC | Age: 75
End: 2020-05-05
Payer: MEDICARE

## 2020-05-05 VITALS
SYSTOLIC BLOOD PRESSURE: 130 MMHG | HEART RATE: 75 BPM | BODY MASS INDEX: 31.17 KG/M2 | WEIGHT: 205 LBS | DIASTOLIC BLOOD PRESSURE: 68 MMHG

## 2020-05-05 DIAGNOSIS — I10 BENIGN ESSENTIAL HYPERTENSION: ICD-10-CM

## 2020-05-05 DIAGNOSIS — I48.0 PAROXYSMAL ATRIAL FIBRILLATION (H): Primary | ICD-10-CM

## 2020-05-05 DIAGNOSIS — Z85.46 HISTORY OF PROSTATE CANCER: ICD-10-CM

## 2020-05-05 PROCEDURE — 99441 ZZC PHYSICIAN TELEPHONE EVALUATION 5-10 MIN: CPT | Performed by: INTERNAL MEDICINE

## 2020-05-05 NOTE — NURSING NOTE
"Chief Complaint   Patient presents with     Atrial Fib      PAF (paroxysmal atrial fibrillation visit, per patient dizzy a few times        Initial /68   Pulse 75   Wt 93 kg (205 lb)   BMI 31.17 kg/m   Estimated body mass index is 31.17 kg/m  as calculated from the following:    Height as of 12/18/19: 1.727 m (5' 8\").    Weight as of this encounter: 93 kg (205 lb)..  BP completed using cuff size: chioma Chen L.P.N.    "

## 2020-05-05 NOTE — PROGRESS NOTES
"Rian Ness is a 74 year old male who is being evaluated via a billable telephone visit.      The patient has been notified of following:     \"This telephone visit will be conducted via a call between you and your physician/provider. We have found that certain health care needs can be provided without the need for a physical exam.  This service lets us provide the care you need with a short phone conversation.  If a prescription is necessary we can send it directly to your pharmacy.  If lab work is needed we can place an order for that and you can then stop by our lab to have the test done at a later time.    Telephone visits are billed at different rates depending on your insurance coverage. During this emergency period, for some insurers they may be billed the same as an in-person visit.  Please reach out to your insurance provider with any questions.    If during the course of the call the physician/provider feels a telephone visit is not appropriate, you will not be charged for this service.\"    Patient has given verbal consent for Telephone visit?  Yes    What phone number would you like to be contacted at?     How would you like to obtain your AVS? MyChart    Phone call duration:  minutes    Referring provider:uJnie Golden MD    HPI: Mr. Rian Ness is a 74 year old  male with PMH significant for prostate carcinoma and hypertension.    The patient was recently found to be in atrial fibrillation when he presented for regular follow-up with Dr. Golden on 10/11/2019.  The patient was feeling mild palpitations on that day.  He reports on and off similar palpitations for over a year but this was never caught on EKG.  The patient denies associated chest pain, dyspnea on exertion, dizziness or syncope.  He is not exactly sure how long the episodes last. He says sometimes they occur two day in a row, sometimes once every few weeks. The patient reports he is able to do his ADLs even though he " feels palpitations.  He states` they do not bother me too much`. TSH was checked which was normal.  Echo obtained on 10/17/2018 showed a structurally normal heart with normal EF with no valvular disease. He is being seen today for new onset atrial fibrillation.    The patient has a history of smoking for 51 pack years quit date 1984.  No prior history of  diabetes, hyperlipidemia, atrial fibrillation or any other cardiac disease.  The patient has been on metoprolol 50 mg for hypertension control.  Patient was started on rivaroxaban 10/11.  The patient recently underwent varicose vein surgery on the left leg.  He is expecting to undergo similar operation for the right leg as well.    The patient overall feels well and denies exertional chest pain.  He reports dyspnea on exertion which is not new.  He is a retired  he still tries to stay active.    I reviewed patient's EKG dated 10/11/2019 which shows atrial fibrillation versus flutter heart rate 89 bpm.  I reviewed today's EKG in clinic today which shows sinus rhythm with one PAC, normal SD/QRS and QTc intervals.  No ST-T wave changes.  Normal axis.    Medications, personal, family, and social history reviewed with patient and revised.    Interval history 5/5/2020:  I called and talked to the patient on the phone today for 6-month follow-up.  The patient reports overall feeling well.  In the interim he switched from Xarelto to warfarin.  INR is within the therapeutic range.  He is on metoprolol 100 mg which I have increased 6 months ago.  The patient tolerated the medication well and he no longer feels palpitations.  Denies chest pain,  shortness of breath, fever or cough.  He reports mild lightheadedness and dizziness and spring allergies.  He is a former smoker.  Patient monitors blood pressure at home and 3 days ago his blood pressure was 136/70 mmHg at home.  His current medications are metoprolol 100 mg, omeprazole and warfarin.  Labs from 3/30/2020  shows normal BMP.     PAST MEDICAL HISTORY:  Past Medical History:   Diagnosis Date     Arthritis      Cancer (H)      Depressive disorder      HTN        CURRENT MEDICATIONS:  Current Outpatient Medications   Medication Sig Dispense Refill     DAILY MULTIVITAMIN PO 1 tab daily.        loratadine (CLARITIN) 10 MG tablet TAKE 1 TABLET(10 MG) BY MOUTH DAILY 90 tablet 3     MAGNESIUM OR 1 tablet daily       metoprolol succinate ER (TOPROL-XL) 100 MG 24 hr tablet Take 1 tablet (100 mg) by mouth daily 90 tablet 3     omeprazole (PRILOSEC) 20 MG DR capsule TAKE 1 CAPSULE 2 TIMES DAILY 180 capsule 3     warfarin ANTICOAGULANT (COUMADIN) 5 MG tablet Take 1 tablet (5 mg) by mouth daily 90 tablet 0       PAST SURGICAL HISTORY:  Past Surgical History:   Procedure Laterality Date     ABDOMEN SURGERY       ARTHROSCOPY KNEE RT/LT      right     ARTHROSCOPY KNEE RT/LT      left     C TOTAL KNEE ARTHROPLASTY  10/11    right knee     CATARACT IOL, RT/LT       COLONOSCOPY       PHACOEMULSIFICATION WITH STANDARD INTRAOCULAR LENS IMPLANT  2010; 2010    left eye; right eye     SUPRAPUBIC PROSTATECTOMY       VASCULAR SURGERY         ALLERGIES:   No Known Allergies    FAMILY HISTORY:  Family History   Problem Relation Age of Onset     Cancer Mother         leukemia     Depression Other      Depression Other      Prostate Cancer Brother      Prostate Cancer Brother      Prostate Cancer Brother          SOCIAL HISTORY:  Social History     Tobacco Use     Smoking status: Former Smoker     Packs/day: 1.50     Years: 34.00     Pack years: 51.00     Types: Cigarettes     Last attempt to quit: 1984     Years since quittin.2     Smokeless tobacco: Never Used     Tobacco comment: Nonsmoking household   Substance Use Topics     Alcohol use: Yes     Comment: very lightly     Drug use: No       ROS:   Constitutional: No fever, chills, or sweats. Weight stable.   ENT: No visual disturbance, ear ache, epistaxis, sore throat.    Cardiovascular: As per HPI.   Respiratory: No cough, hemoptysis.    GI: No nausea, vomiting, hematemesis, melena, or hematochezia.   : No hematuria.   Integument: Negative.   Psychiatric: Negative.   Hematologic:  No easy bruising, no easy bleeding.  Neuro: Negative.   Endocrinology: No significant heat or cold intolerance   Musculoskeletal: No myalgia.    I have reviewed the labs .    Labs:  CBC RESULTS:   Lab Results   Component Value Date    WBC 6.9 11/10/2014    RBC 4.59 11/10/2014    HGB 13.7 11/10/2014    HCT 42.3 11/10/2014    MCV 92 11/10/2014    MCH 29.8 11/10/2014    MCHC 32.4 11/10/2014    RDW 13.5 11/10/2014     11/10/2014       BMP RESULTS:  Lab Results   Component Value Date     03/30/2020    POTASSIUM 4.2 03/30/2020    CHLORIDE 105 03/30/2020    CO2 29 03/30/2020    ANIONGAP 6 03/30/2020     (H) 03/30/2020    BUN 20 03/30/2020    CR 1.00 03/30/2020    GFRESTIMATED 73 03/30/2020    GFRESTBLACK 84 03/30/2020    JENNY 8.7 03/30/2020      Echocardiogram 10/17/2019  Left ventricular function is normal. EF > 65%.No regional wall motion  abnormalities are seen.  Global right ventricular function is normal.  No significant valvular abnormalities.  The inferior vena cava was normal in size with preserved respiratory  variability.  No pericardial effusion is present.     There is no prior study for direct comparison.      Assessment and Plan:   Mr. Rian Ness is a 74 year old  male with PMH significant for prostate carcinoma , hypertension and paroxysmal atrial fibrillation.    I talked to the patient on the phone today for 6-month follow-up.    1.  Paroxysmal atrial fibrillation: Patient currently asymptomatic.  Recommended to continue rate control with metoprolol 100 mg and warfarin. Patient's chads VASC score is 3  (age 1, hypertension 1) therefore he will need lifelong anticoagulation.  No underlying structural heart disease by echo.      2.  Hypertension: Well controlled on  metoprolol.      3.  Bilateral varicose veins: Underwent bilateral surgery.      No medication changes today.  Return to clinic 1 year.      Phone call duration 9 minutes.    Please donot hesitate to contact me if you have any questions or concerns. Again, thank you for allowing me to participate in the care of your patient.    Yash LEOS MD  [facility] Division of Cardiology  Pager [phone]

## 2020-05-05 NOTE — PATIENT INSTRUCTIONS
Thank you for coming to the [facility] @ [place]; please note the following instructions:    1.   No medication changes recommended.     2. Dr. Berrios Can would like you to return for a cardiac follow up in 1 year  (May).  We will contact you regarding your appointment when the time draws closer or you may call [phone] to arrange an appointment.  Mean while, if you should have any questions or concerns regarding your heart health, please contact us.  Thank you for choosing [facility] for your care.              If you have any questions regarding your visit please contact your care team:     Cardiology  Telephone Number   Luda ROGEL, RN  Brynn HANEY,RN  Miesha MAIER, BEARA  Sydnie SHAH, MA  Candida JACQUES, ARTIEN   [phone]   (select option 1)    *After hours: [phone]     For scheduling appts:     [phone] or    [phone] (select option 1)    *After hours: [phone]     For the Device Clinic (Pacemakers and ICD's)  RN's :  Ana Cam   During business hours: [phone]    *After business hours:  [phone] (select option 4)      Normal test result notifications will be released via [employer] or mailed within 7 business days.  All other test results, will be communicated via telephone once reviewed by your cardiologist.    If you need a medication refill please contact your pharmacy.  Please allow 3 business days for your refill to be completed.    As always, thank you for trusting us with your health care needs!

## 2020-05-14 ENCOUNTER — MYC REFILL (OUTPATIENT)
Dept: FAMILY MEDICINE | Facility: CLINIC | Age: 75
End: 2020-05-14

## 2020-05-14 DIAGNOSIS — I48.91 NEW ONSET ATRIAL FIBRILLATION (H): ICD-10-CM

## 2020-05-14 RX ORDER — WARFARIN SODIUM 5 MG/1
5 TABLET ORAL DAILY
Qty: 90 TABLET | Refills: 0 | Status: SHIPPED | OUTPATIENT
Start: 2020-05-14 | End: 2020-10-19

## 2020-05-14 NOTE — TELEPHONE ENCOUNTER
Refill approved per protocol.    Per review of pt's most recent INR Clinic Referral, pt's INR is at goal at current dose.  INR Goal  2.0-3.0     Responsible Group  LATIA MEJIAS       INR   Date Value Ref Range Status   04/27/2020 2.70 (H) 0.86 - 1.14 Final     Comment:     This test is intended for monitoring Coumadin therapy.  Results are not   accurate in patients with prolonged INR due to factor deficiency.       Josette Ortega, BSN, RN

## 2020-06-01 ENCOUNTER — ANTICOAGULATION THERAPY VISIT (OUTPATIENT)
Dept: NURSING | Facility: CLINIC | Age: 75
End: 2020-06-01

## 2020-06-01 DIAGNOSIS — I48.0 PAF (PAROXYSMAL ATRIAL FIBRILLATION) (H): ICD-10-CM

## 2020-06-01 LAB
CAPILLARY BLOOD COLLECTION: NORMAL
INR PPP: 2.9 (ref 0.86–1.14)

## 2020-06-01 PROCEDURE — 36416 COLLJ CAPILLARY BLOOD SPEC: CPT | Performed by: FAMILY MEDICINE

## 2020-06-01 PROCEDURE — 99207 ZZC NO CHARGE NURSE ONLY: CPT

## 2020-06-01 PROCEDURE — 85610 PROTHROMBIN TIME: CPT | Performed by: FAMILY MEDICINE

## 2020-06-01 NOTE — PROGRESS NOTES
ANTICOAGULATION FOLLOW-UP CLINIC VISIT    Patient Name:  Rian Ness  Date:  2020  Contact Type:  Telephone    SUBJECTIVE:  Patient Findings     Comments:   Discussed with patient. The patient was assessed for diet, medication, and activity level changes, missed or extra doses, bruising or bleeding, with no problem findings. Continue same.           Clinical Outcomes     Comments:   Discussed with patient. The patient was assessed for diet, medication, and activity level changes, missed or extra doses, bruising or bleeding, with no problem findings. Continue same.              OBJECTIVE    Recent labs: (last 7 days)     20  0931   INR 2.90*       ASSESSMENT / PLAN  INR assessment THER    Recheck INR In: 5 WEEKS    INR Location Clinic      Anticoagulation Summary  As of 2020    INR goal:   2.0-3.0   TTR:   68.6 % (6.1 mo)   INR used for dosin.90 (2020)   Warfarin maintenance plan:   5 mg (5 mg x 1) every day   Full warfarin instructions:   5 mg every day   Weekly warfarin total:   35 mg   No change documented:   Charley Francisco RN   Plan last modified:   Charley Francisco RN (2/3/2020)   Next INR check:   2020   Priority:   High   Target end date:       Indications    PAF (paroxysmal atrial fibrillation) (H) [I48.0]             Anticoagulation Episode Summary     INR check location:       Preferred lab:       Send INR reminders to:   LATIA MEJIAS    Comments:         Anticoagulation Care Providers     Provider Role Specialty Phone number    Junie Golden MD Referring [facility] [phone]            See the Encounter Report to view Anticoagulation Flowsheet and Dosing Calendar (Go to Encounters tab in chart review, and find the Anticoagulation Therapy Visit)        Charley Francisco RN

## 2020-06-22 NOTE — PROGRESS NOTES
Subjective     Rian Ness is a 74 year old male who presents to clinic today for the following health issues:    HPI   Lump on head       Duration: 1 year     Description (location/character/radiation): Right temporal area scab, dry skin     Intensity:  Not improving     Accompanying signs and symptoms: Rash, itching, redness, oozing     History (similar episodes/previous evaluation):     Precipitating or alleviating factors: has become tender to the touch     Therapies tried and outcome: washing it, keeping area dry      Area on right temple with central scab x 1 year, now with redness and flaking.     Patient Active Problem List   Diagnosis     Testicular hypofunction     Osteoarthritis, knee     Malignant neoplasm of prostate (H)     CARDIOVASCULAR SCREENING; LDL GOAL LESS THAN 130     Advanced directives, counseling/discussion     Status post total knee replacement     AC (acromioclavicular) joint arthritis     Biceps tendonitis     Pseudophakia,ou     Hypertension goal BP (blood pressure) < 140/90     Gastroesophageal reflux disease without esophagitis     Posterior vitreous detachment, bilateral     S/P complete repair of rotator cuff     PAF (paroxysmal atrial fibrillation) (H)     Past Surgical History:   Procedure Laterality Date     ABDOMEN SURGERY       ARTHROSCOPY KNEE RT/LT      right     ARTHROSCOPY KNEE RT/LT      left     C TOTAL KNEE ARTHROPLASTY  10/11    right knee     CATARACT IOL, RT/LT       COLONOSCOPY       PHACOEMULSIFICATION WITH STANDARD INTRAOCULAR LENS IMPLANT  2010; 2010    left eye; right eye     SUPRAPUBIC PROSTATECTOMY       VASCULAR SURGERY         Social History     Tobacco Use     Smoking status: Former Smoker     Packs/day: 1.50     Years: 34.00     Pack years: 51.00     Types: Cigarettes     Last attempt to quit: 1984     Years since quittin.4     Smokeless tobacco: Never Used     Tobacco comment: Nonsmoking household   Substance Use Topics      "Alcohol use: Yes     Comment: very lightly     Family History   Problem Relation Age of Onset     Cancer Mother         leukemia     Depression Other      Depression Other      Prostate Cancer Brother      Prostate Cancer Brother      Prostate Cancer Brother          Current Outpatient Medications   Medication Sig Dispense Refill     DAILY MULTIVITAMIN PO 1 tab daily.        ketoconazole (NIZORAL) 2 % external cream Apply topically daily 60 g 0     ketoconazole (NIZORAL) 2 % external shampoo Apply topically daily as needed for itching or irritation 120 mL 3     loratadine (CLARITIN) 10 MG tablet TAKE 1 TABLET(10 MG) BY MOUTH DAILY 90 tablet 3     MAGNESIUM OR 1 tablet daily       metoprolol succinate ER (TOPROL-XL) 100 MG 24 hr tablet Take 1 tablet (100 mg) by mouth daily 90 tablet 3     omeprazole (PRILOSEC) 20 MG DR capsule TAKE 1 CAPSULE 2 TIMES DAILY 180 capsule 3     warfarin ANTICOAGULANT (COUMADIN) 5 MG tablet Take 1 tablet (5 mg) by mouth daily 90 tablet 0     BP Readings from Last 3 Encounters:   06/30/20 110/72   05/05/20 130/68   12/18/19 131/82    Wt Readings from Last 3 Encounters:   06/30/20 98 kg (216 lb)   05/05/20 93 kg (205 lb)   12/18/19 95.7 kg (211 lb)                    Reviewed and updated as needed this visit by Provider  Tobacco  Allergies  Meds  Problems  Med Hx  Surg Hx  Fam Hx         Review of Systems   Constitutional, HEENT, cardiovascular, pulmonary, gi and gu systems are negative, except as otherwise noted.      Objective    /72   Pulse 84   Temp 97.7  F (36.5  C) (Oral)   Resp 20   Ht 1.727 m (5' 8\")   Wt 98 kg (216 lb)   SpO2 98%   BMI 32.84 kg/m    Body mass index is 32.84 kg/m .  Physical Exam   GENERAL: healthy, alert and no distress  EYES: Eyes grossly normal to inspection, PERRL and conjunctivae and sclerae normal  MS: no gross musculoskeletal defects noted, no edema  SKIN: right temple with keratotic lesion, 8 mm diameter, with large area of surrounding " "erythema and flaking.   PSYCH: mentation appears normal, affect normal/bright    Diagnostic Test Results:  Labs reviewed in Epic        Assessment & Plan     (L21.9) Seborrheic dermatitis  (primary encounter diagnosis)  Comment: likely seb derm surrounding seb K  Plan: ketoconazole (NIZORAL) 2 % external cream,         ketoconazole (NIZORAL) 2 % external shampoo        Treat for 2 weeks, follow-up scheduled for 7/14/2020 to reassess and possible punch bx.       BMI:   Estimated body mass index is 32.84 kg/m  as calculated from the following:    Height as of this encounter: 1.727 m (5' 8\").    Weight as of this encounter: 98 kg (216 lb).   Weight management plan: Discussed healthy diet and exercise guidelines        Patient Instructions     Use shampoo whenever you shower.      Use cream twice daily to entire area.        Return in about 2 weeks (around 7/14/2020) for punch biopsy.    Junie Golden MD  [facility]    "

## 2020-06-30 ENCOUNTER — OFFICE VISIT (OUTPATIENT)
Dept: FAMILY MEDICINE | Facility: CLINIC | Age: 75
End: 2020-06-30
Payer: MEDICARE

## 2020-06-30 VITALS
BODY MASS INDEX: 32.74 KG/M2 | WEIGHT: 216 LBS | HEIGHT: 68 IN | TEMPERATURE: 97.7 F | HEART RATE: 84 BPM | RESPIRATION RATE: 20 BRPM | SYSTOLIC BLOOD PRESSURE: 110 MMHG | DIASTOLIC BLOOD PRESSURE: 72 MMHG | OXYGEN SATURATION: 98 %

## 2020-06-30 DIAGNOSIS — L21.9 SEBORRHEIC DERMATITIS: Primary | ICD-10-CM

## 2020-06-30 PROCEDURE — 99213 OFFICE O/P EST LOW 20 MIN: CPT | Performed by: FAMILY MEDICINE

## 2020-06-30 RX ORDER — KETOCONAZOLE 20 MG/G
CREAM TOPICAL DAILY
Qty: 60 G | Refills: 0 | Status: SHIPPED | OUTPATIENT
Start: 2020-06-30 | End: 2021-04-08

## 2020-06-30 RX ORDER — KETOCONAZOLE 20 MG/ML
SHAMPOO TOPICAL DAILY PRN
Qty: 120 ML | Refills: 3 | Status: SHIPPED | OUTPATIENT
Start: 2020-06-30 | End: 2021-04-08

## 2020-06-30 ASSESSMENT — MIFFLIN-ST. JEOR: SCORE: 1684.27

## 2020-06-30 NOTE — NURSING NOTE
"Chief Complaint   Patient presents with     Derm Problem       Initial /72   Pulse 84   Temp 97.7  F (36.5  C) (Oral)   Resp 20   Ht 1.727 m (5' 8\")   Wt 98 kg (216 lb)   SpO2 98%   BMI 32.84 kg/m   Estimated body mass index is 32.84 kg/m  as calculated from the following:    Height as of this encounter: 1.727 m (5' 8\").    Weight as of this encounter: 98 kg (216 lb).  Medication Reconciliation: complete  Mat Obrien CMA    "

## 2020-07-06 ENCOUNTER — ANTICOAGULATION THERAPY VISIT (OUTPATIENT)
Dept: NURSING | Facility: CLINIC | Age: 75
End: 2020-07-06

## 2020-07-06 DIAGNOSIS — I48.0 PAF (PAROXYSMAL ATRIAL FIBRILLATION) (H): ICD-10-CM

## 2020-07-06 LAB
CAPILLARY BLOOD COLLECTION: NORMAL
INR PPP: 3.6 (ref 0.86–1.14)

## 2020-07-06 PROCEDURE — 36416 COLLJ CAPILLARY BLOOD SPEC: CPT | Performed by: FAMILY MEDICINE

## 2020-07-06 PROCEDURE — 99207 ZZC NO CHARGE NURSE ONLY: CPT

## 2020-07-06 PROCEDURE — 85610 PROTHROMBIN TIME: CPT | Performed by: FAMILY MEDICINE

## 2020-07-06 NOTE — PROGRESS NOTES
ANTICOAGULATION FOLLOW-UP CLINIC VISIT    Patient Name:  Rian Ness  Date:  7/6/2020  Contact Type:  Telephone    SUBJECTIVE:  Patient Findings     Positives:   Extra doses    Comments:   Supra. Discussed with patient. He thinks he may have taken an extra dose on Saturday. No other concerns.         Clinical Outcomes     Comments:   Supra. Discussed with patient. He thinks he may have taken an extra dose on Saturday. No other concerns.            OBJECTIVE    Recent labs: (last 7 days)     07/06/20  0927   INR 3.60*       ASSESSMENT / PLAN  INR assessment SUPRA    Recheck INR In: 2 WEEKS    INR Location Clinic      Anticoagulation Summary  As of 7/6/2020    INR goal:   2.0-3.0   TTR:   59.9 % (7.3 mo)   INR used for dosing:   3.60! (7/6/2020)   Warfarin maintenance plan:   5 mg (5 mg x 1) every day   Full warfarin instructions:   7/6: Hold; Otherwise 5 mg every day   Weekly warfarin total:   35 mg   Plan last modified:   Charley Francisco RN (2/3/2020)   Next INR check:   7/20/2020   Priority:   High   Target end date:       Indications    PAF (paroxysmal atrial fibrillation) (H) [I48.0]             Anticoagulation Episode Summary     INR check location:       Preferred lab:       Send INR reminders to:   LATIA MEJIAS    Comments:         Anticoagulation Care Providers     Provider Role Specialty Phone number    Junie Golden MD Referring [facility] [phone]            See the Encounter Report to view Anticoagulation Flowsheet and Dosing Calendar (Go to Encounters tab in chart review, and find the Anticoagulation Therapy Visit)    Dosage adjustment made based on physician directed care plan.    Charley Francisco RN

## 2020-07-14 ENCOUNTER — OFFICE VISIT (OUTPATIENT)
Dept: FAMILY MEDICINE | Facility: CLINIC | Age: 75
End: 2020-07-14
Payer: MEDICARE

## 2020-07-14 VITALS
BODY MASS INDEX: 32.05 KG/M2 | SYSTOLIC BLOOD PRESSURE: 114 MMHG | TEMPERATURE: 97 F | WEIGHT: 210.8 LBS | DIASTOLIC BLOOD PRESSURE: 62 MMHG | HEART RATE: 92 BPM | OXYGEN SATURATION: 100 % | RESPIRATION RATE: 20 BRPM

## 2020-07-14 DIAGNOSIS — M54.2 CERVICALGIA: ICD-10-CM
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
## 2020-07-14 DIAGNOSIS — S62.657D CLOSED NONDISPLACED FRACTURE OF MIDDLE PHALANX OF LEFT LITTLE FINGER WITH ROUTINE HEALING, SUBSEQUENT ENCOUNTER: ICD-10-CM

## 2020-07-14 DIAGNOSIS — L21.9 SEBORRHEIC DERMATITIS: ICD-10-CM

## 2020-07-14 DIAGNOSIS — S02.2XXD CLOSED FRACTURE OF NASAL BONE WITH ROUTINE HEALING, SUBSEQUENT ENCOUNTER: ICD-10-CM

## 2020-07-14 PROCEDURE — 99214 OFFICE O/P EST MOD 30 MIN: CPT | Performed by: FAMILY MEDICINE

## 2020-07-14 RX ORDER — BACLOFEN 5 MG/1
5 TABLET ORAL 3 TIMES DAILY PRN
Qty: 30 TABLET | Refills: 0 | Status: SHIPPED | OUTPATIENT
Start: 2020-07-14 | End: 2021-04-08

## 2020-07-14 ASSESSMENT — PAIN SCALES - GENERAL: PAINLEVEL: EXTREME PAIN (8)

## 2020-07-14 NOTE — PATIENT INSTRUCTIONS
Change splint every 2 days, remove splint and dressing.  Can use bandage instead of telfa on the wound, then put splint back on with tape

## 2020-07-14 NOTE — PROGRESS NOTES
Subjective     Rian Ness is a 74 year old male who presents to clinic today for the following health issues:    HPI       ED/UC Followup:    Facility:  [facility]   Date of visit: 7/11/20  Reason for visit: Fracture of nasal bones, closed avulsion fracture of proximal phalanx of finger, fall from slip, trip or stumble, facial laceration   Current Status: Concerned about weeping on finger.  Would like to have wound redressed today.   Facial pain is manageable unless touched.   Muscles of neck are tight, painful to touch at back of neck.    Patient reports headache on Monday night that has resolved.    Neck pain rated 8/10 with movement.  Also noting pain with swallowing      Pt here as follow-up for possible biopsy, see OV note 6//30/2020 for full hx, treated with ketoconazole cream and shampoo has resolved issues and lesion.   Had fall and ED visit. See above for injuries.  No severe pain, tolerating with tylenol.    Neck remains stiff and painful, wondering about medication for that.   Small finger on left hand with avulsion fx and abrasions. Dressing with staining,pt requesting assistance with dressing change and splint change.         Patient Active Problem List   Diagnosis     Testicular hypofunction     Osteoarthritis, knee     Malignant neoplasm of prostate (H)     CARDIOVASCULAR SCREENING; LDL GOAL LESS THAN 130     Advanced directives, counseling/discussion     Status post total knee replacement     AC (acromioclavicular) joint arthritis     Biceps tendonitis     Pseudophakia,ou     Hypertension goal BP (blood pressure) < 140/90     Gastroesophageal reflux disease without esophagitis     Posterior vitreous detachment, bilateral     S/P complete repair of rotator cuff     PAF (paroxysmal atrial fibrillation) (H)     Past Surgical History:   Procedure Laterality Date     ABDOMEN SURGERY       ARTHROSCOPY KNEE RT/LT  2000    right     ARTHROSCOPY KNEE RT/LT  1998    left     C TOTAL KNEE ARTHROPLASTY  10/11     right knee     CATARACT IOL, RT/LT       COLONOSCOPY       PHACOEMULSIFICATION WITH STANDARD INTRAOCULAR LENS IMPLANT  2010; 2010    left eye; right eye     SUPRAPUBIC PROSTATECTOMY       VASCULAR SURGERY         Social History     Tobacco Use     Smoking status: Former Smoker     Packs/day: 1.50     Years: 34.00     Pack years: 51.00     Types: Cigarettes     Last attempt to quit: 1984     Years since quittin.4     Smokeless tobacco: Never Used     Tobacco comment: Nonsmoking household   Substance Use Topics     Alcohol use: Yes     Comment: very lightly     Family History   Problem Relation Age of Onset     Cancer Mother         leukemia     Depression Other      Depression Other      Prostate Cancer Brother      Prostate Cancer Brother      Prostate Cancer Brother          Current Outpatient Medications   Medication Sig Dispense Refill     Baclofen (LIORESAL) 5 MG tablet Take 1 tablet (5 mg) by mouth 3 times daily as needed for muscle spasms 30 tablet 0     DAILY MULTIVITAMIN PO 1 tab daily.        ketoconazole (NIZORAL) 2 % external cream Apply topically daily 60 g 0     ketoconazole (NIZORAL) 2 % external shampoo Apply topically daily as needed for itching or irritation 120 mL 3     loratadine (CLARITIN) 10 MG tablet TAKE 1 TABLET(10 MG) BY MOUTH DAILY 90 tablet 3     MAGNESIUM OR 1 tablet daily       metoprolol succinate ER (TOPROL-XL) 100 MG 24 hr tablet Take 1 tablet (100 mg) by mouth daily 90 tablet 3     omeprazole (PRILOSEC) 20 MG DR capsule TAKE 1 CAPSULE 2 TIMES DAILY 180 capsule 3     warfarin ANTICOAGULANT (COUMADIN) 5 MG tablet Take 1 tablet (5 mg) by mouth daily 90 tablet 0     BP Readings from Last 3 Encounters:   20 114/62   20 110/72   20 130/68    Wt Readings from Last 3 Encounters:   20 95.6 kg (210 lb 12.8 oz)   20 98 kg (216 lb)   20 93 kg (205 lb)                    Reviewed and updated as needed this visit by Provider  Sabina   Allergies  Meds  Problems  Med Hx  Surg Hx  Fam Hx         Review of Systems   Constitutional, HEENT, cardiovascular, pulmonary, gi and gu systems are negative, except as otherwise noted.      Objective    /62   Pulse 92   Temp 97  F (36.1  C) (Oral)   Resp 20   Wt 95.6 kg (210 lb 12.8 oz)   SpO2 100%   BMI 32.05 kg/m    Body mass index is 32.05 kg/m .  Physical Exam   GENERAL: healthy, alert and no distress  EYES: Eyes grossly normal to inspection and raccoon eyes patterned bruising  HENT: normal cephalic/atraumatic, oropharynx clear, oral mucous membranes moist and multiple facial abrasions  MS: no gross musculoskeletal defects noted, no edema, splint on little left finger with staining.   SKIN: no suspicious lesions or rashes  PSYCH: mentation appears normal, affect normal/bright    Diagnostic Test Results:  Labs reviewed in Epic        Assessment & Plan     (W19.XXXD) Fall, subsequent encounter  (primary encounter diagnosis)  (S02.2XXD) Closed fracture of nasal bone with routine healing, subsequent encounter  (S62.657D) Closed nondisplaced fracture of middle phalanx of left little finger with routine healing, subsequent encounter  (M54.2) Cervicalgia  Comment: multiple injuries on fall with no LOC  Plan: Baclofen (LIORESAL) 5 MG tablet        Splint and dressing removed after washed with water due to splint material sticking to abrasions.  Dressed with bacitracin and telfa then splint replaced.  Baclofen trial for neck pain.  Wound care discussed.     (L21.9) Seborrheic dermatitis  Comment: resolved with ketoconazole  Plan: continue prn ketoconazole  Signs of skin cancer: The ABCDE rule  A for asymmetry: A mole that, when divided in half, doesn't look the same on both sides.  B for border: A mole with edges that are blurry or jagged.  C for color: Changes in the color of a mole, including darkening, spread of color, loss of color, or the appearance of multiple colors such as blue, red, white,  pink, purple or gray.  D for diameter: A mole larger than 1/4 inch in diameter (about the size of a pencil eraser).  E for elevation: A mole that is raised above the skin and has an uneven surface.          Patient Instructions       Change splint every 2 days, remove splint and dressing.  Can use bandage instead of telfa on the wound, then put splint back on with tape      Return in about 1 week (around 7/21/2020) for if not improved or symptoms worsen.    Junie Golden MD  [facility]

## 2020-07-21 ENCOUNTER — ANTICOAGULATION THERAPY VISIT (OUTPATIENT)
Dept: NURSING | Facility: CLINIC | Age: 75
End: 2020-07-21

## 2020-07-21 DIAGNOSIS — I48.0 PAF (PAROXYSMAL ATRIAL FIBRILLATION) (H): ICD-10-CM

## 2020-07-21 LAB
CAPILLARY BLOOD COLLECTION: NORMAL
INR PPP: 2.2 (ref 0.86–1.14)

## 2020-07-21 PROCEDURE — 85610 PROTHROMBIN TIME: CPT | Performed by: FAMILY MEDICINE

## 2020-07-21 PROCEDURE — 36416 COLLJ CAPILLARY BLOOD SPEC: CPT | Performed by: FAMILY MEDICINE

## 2020-07-21 PROCEDURE — 99207 ZZC NO CHARGE NURSE ONLY: CPT

## 2020-07-21 NOTE — PROGRESS NOTES
ANTICOAGULATION FOLLOW-UP CLINIC VISIT    Patient Name:  Rian Ness  Date:  2020  Contact Type:  Telephone    SUBJECTIVE:  Patient Findings     Positives:   Bruising    Comments:   ED visit for fall about a week ago injuring his face. Reports this is healing well. No other changes or concerns. Therapeutic. Continue same dose.        Clinical Outcomes     Negatives:   Major bleeding event, Thromboembolic event, Anticoagulation-related hospital admission, Anticoagulation-related ED visit, Anticoagulation-related fatality    Comments:   ED visit for fall about a week ago injuring his face. Reports this is healing well. No other changes or concerns. Therapeutic. Continue same dose.           OBJECTIVE    Recent labs: (last 7 days)     20  1002   INR 2.20*       ASSESSMENT / PLAN  INR assessment THER    Recheck INR In: 4 WEEKS    INR Location Clinic      Anticoagulation Summary  As of 2020    INR goal:   2.0-3.0   TTR:   59.7 % (7.8 mo)   INR used for dosin.20 (2020)   Warfarin maintenance plan:   5 mg (5 mg x 1) every day   Full warfarin instructions:   5 mg every day   Weekly warfarin total:   35 mg   No change documented:   Charley Francisco RN   Plan last modified:   Charley Francisco RN (2/3/2020)   Next INR check:   2020   Priority:   Maintenance   Target end date:       Indications    PAF (paroxysmal atrial fibrillation) (H) [I48.0]             Anticoagulation Episode Summary     INR check location:       Preferred lab:       Send INR reminders to:   LATIA MEJIAS    Comments:         Anticoagulation Care Providers     Provider Role Specialty Phone number    Junie Golden MD Referring [facility] [phone]            See the Encounter Report to view Anticoagulation Flowsheet and Dosing Calendar (Go to Encounters tab in chart review, and find the Anticoagulation Therapy Visit)    Dosage adjustment made based on physician directed care plan.    Charley MAIER  TONY Francisco

## 2020-08-24 ENCOUNTER — ANTICOAGULATION THERAPY VISIT (OUTPATIENT)
Dept: NURSING | Facility: CLINIC | Age: 75
End: 2020-08-24
Payer: MEDICARE

## 2020-08-24 ENCOUNTER — TELEPHONE (OUTPATIENT)
Dept: FAMILY MEDICINE | Facility: CLINIC | Age: 75
End: 2020-08-24

## 2020-08-24 DIAGNOSIS — I48.0 PAF (PAROXYSMAL ATRIAL FIBRILLATION) (H): Primary | ICD-10-CM

## 2020-08-24 DIAGNOSIS — I48.0 PAF (PAROXYSMAL ATRIAL FIBRILLATION) (H): ICD-10-CM

## 2020-08-24 LAB
CAPILLARY BLOOD COLLECTION: NORMAL
INR PPP: 2.7 (ref 0.86–1.14)

## 2020-08-24 PROCEDURE — 36416 COLLJ CAPILLARY BLOOD SPEC: CPT | Performed by: FAMILY MEDICINE

## 2020-08-24 PROCEDURE — 99207 ZZC NO CHARGE NURSE ONLY: CPT

## 2020-08-24 PROCEDURE — 85610 PROTHROMBIN TIME: CPT | Performed by: FAMILY MEDICINE

## 2020-08-24 RX ORDER — WARFARIN SODIUM 5 MG/1
5 TABLET ORAL DAILY
Qty: 90 TABLET | Refills: 0 | Status: SHIPPED | OUTPATIENT
Start: 2020-08-24 | End: 2020-11-30

## 2020-08-24 NOTE — PROGRESS NOTES
ANTICOAGULATION FOLLOW-UP CLINIC VISIT    Patient Name:  Rian Ness  Date:  2020  Contact Type:  Telephone    SUBJECTIVE:  Patient Findings     Comments:   The patient was assessed for diet, medication, and activity level changes, missed or extra doses, bruising or bleeding, with no problem findings.          Clinical Outcomes     Negatives:   Major bleeding event, Thromboembolic event, Anticoagulation-related hospital admission, Anticoagulation-related ED visit, Anticoagulation-related fatality    Comments:   The patient was assessed for diet, medication, and activity level changes, missed or extra doses, bruising or bleeding, with no problem findings.             OBJECTIVE    Recent labs: (last 7 days)     20  1438   INR 2.70*       ASSESSMENT / PLAN  INR assessment THER    Recheck INR In: 4 WEEKS    INR Location Clinic      Anticoagulation Summary  As of 2020    INR goal:   2.0-3.0   TTR:   64.9 % (8.9 mo)   INR used for dosin.70 (2020)   Warfarin maintenance plan:   5 mg (5 mg x 1) every day   Full warfarin instructions:   5 mg every day   Weekly warfarin total:   35 mg   No change documented:   Charley Francisco RN   Plan last modified:   Charley Francisco RN (2/3/2020)   Next INR check:   2020   Priority:   Maintenance   Target end date:       Indications    PAF (paroxysmal atrial fibrillation) (H) [I48.0]             Anticoagulation Episode Summary     INR check location:       Preferred lab:       Send INR reminders to:   LATIA MEJIAS    Comments:         Anticoagulation Care Providers     Provider Role Specialty Phone number    Junie Golden MD Referring [facility] [phone]            See the Encounter Report to view Anticoagulation Flowsheet and Dosing Calendar (Go to Encounters tab in chart review, and find the Anticoagulation Therapy Visit)    Dosage adjustment made based on physician directed care plan.    Charley Francisco RN

## 2020-08-24 NOTE — TELEPHONE ENCOUNTER
Requests refill warfarin to [facility] order pharmacy.Prescription approved per [facility] Refill Protocol. Charley Francisco RN

## 2020-09-21 ENCOUNTER — ANTICOAGULATION THERAPY VISIT (OUTPATIENT)
Dept: NURSING | Facility: CLINIC | Age: 75
End: 2020-09-21

## 2020-09-21 DIAGNOSIS — I48.0 PAF (PAROXYSMAL ATRIAL FIBRILLATION) (H): ICD-10-CM

## 2020-09-21 LAB
CAPILLARY BLOOD COLLECTION: NORMAL
INR PPP: 2.6 (ref 0.86–1.14)

## 2020-09-21 PROCEDURE — 36416 COLLJ CAPILLARY BLOOD SPEC: CPT | Performed by: FAMILY MEDICINE

## 2020-09-21 PROCEDURE — 85610 PROTHROMBIN TIME: CPT | Performed by: FAMILY MEDICINE

## 2020-09-21 PROCEDURE — 99207 ZZC NO CHARGE NURSE ONLY: CPT

## 2020-09-21 NOTE — PROGRESS NOTES
ANTICOAGULATION FOLLOW-UP CLINIC VISIT    Patient Name:  Rian Ness  Date:  2020  Contact Type:  Telephone    SUBJECTIVE:  Patient Findings     Comments:   The patient was assessed for diet, medication, and activity level changes, missed or extra doses, bruising or bleeding, with no problem findings.          Clinical Outcomes     Negatives:   Major bleeding event, Thromboembolic event, Anticoagulation-related hospital admission, Anticoagulation-related ED visit, Anticoagulation-related fatality    Comments:   The patient was assessed for diet, medication, and activity level changes, missed or extra doses, bruising or bleeding, with no problem findings.             OBJECTIVE    Recent labs: (last 7 days)     20  1003   INR 2.60*       ASSESSMENT / PLAN  INR assessment THER    Recheck INR In: 4 WEEKS    INR Location Clinic      Anticoagulation Summary  As of 2020    INR goal:   2.0-3.0   TTR:   68.2 % (9.8 mo)   INR used for dosin.60 (2020)   Warfarin maintenance plan:   5 mg (5 mg x 1) every day   Full warfarin instructions:   5 mg every day   Weekly warfarin total:   35 mg   No change documented:   Charley Francisco RN   Plan last modified:   Charley Francisco RN (2/3/2020)   Next INR check:   10/19/2020   Priority:   Maintenance   Target end date:       Indications    PAF (paroxysmal atrial fibrillation) (H) [I48.0]             Anticoagulation Episode Summary     INR check location:       Preferred lab:       Send INR reminders to:   LATIA MEJIAS    Comments:         Anticoagulation Care Providers     Provider Role Specialty Phone number    Junie Golden MD Referring [facility] [phone]            See the Encounter Report to view Anticoagulation Flowsheet and Dosing Calendar (Go to Encounters tab in chart review, and find the Anticoagulation Therapy Visit)    Dosage adjustment made based on physician directed care plan.    Charley Francisco RN

## 2020-10-11 ENCOUNTER — MYC REFILL (OUTPATIENT)
Dept: CARDIOLOGY | Facility: CLINIC | Age: 75
End: 2020-10-11

## 2020-10-11 DIAGNOSIS — I48.0 PAF (PAROXYSMAL ATRIAL FIBRILLATION) (H): ICD-10-CM

## 2020-10-12 RX ORDER — METOPROLOL SUCCINATE 100 MG/1
100 TABLET, EXTENDED RELEASE ORAL DAILY
Qty: 90 TABLET | Refills: 3 | Status: SHIPPED | OUTPATIENT
Start: 2020-10-12 | End: 2021-07-06

## 2020-10-12 NOTE — TELEPHONE ENCOUNTER
Signed Prescriptions:                        Disp   Refills    metoprolol succinate ER (TOPROL-XL) 100 MG*90 tab*3        Sig: Take 1 tablet (100 mg) by mouth daily  Authorizing Provider: VANESSA LEOS  Ordering User: LUDA GRIFFITH    Rx filled per refill protocol.  Luda Griffith RN

## 2020-10-19 ENCOUNTER — ANTICOAGULATION THERAPY VISIT (OUTPATIENT)
Dept: NURSING | Facility: CLINIC | Age: 75
End: 2020-10-19

## 2020-10-19 DIAGNOSIS — I48.0 PAF (PAROXYSMAL ATRIAL FIBRILLATION) (H): ICD-10-CM

## 2020-10-19 LAB
CAPILLARY BLOOD COLLECTION: NORMAL
INR PPP: 2.9 (ref 0.86–1.14)

## 2020-10-19 PROCEDURE — 99207 PR NO CHARGE NURSE ONLY: CPT

## 2020-10-19 PROCEDURE — 36416 COLLJ CAPILLARY BLOOD SPEC: CPT | Performed by: FAMILY MEDICINE

## 2020-10-19 PROCEDURE — 85610 PROTHROMBIN TIME: CPT | Performed by: FAMILY MEDICINE

## 2020-10-19 NOTE — PROGRESS NOTES
ANTICOAGULATION MANAGEMENT     Patient Name:  Rian Ness  Date:  10/19/2020    ASSESSMENT /SUBJECTIVE:    Today's INR result of 2.9 is therapeutic. Goal INR of 2.0-3.0      Warfarin dose taken: Warfarin taken as instructed    Diet: No new diet changes affecting INR    Medication changes/ interactions: No new medications/supplements affecting INR    Previous INR: Therapeutic     S/S of bleeding or thromboembolism: No    New injury or illness: No    Upcoming surgery, procedure or cardioversion: No    Additional findings: None      PLAN:    Telephone call with Rian regarding INR result and instructed:     Warfarin Dosing Instructions: Continue your current warfarin dose 5 mg daily    Instructed patient to follow up no later than: 4 weeks  Lab visit scheduled    Education provided: Contact the anticoagulation clinic with any changes, questions or concerns at #[phone]       Grover verbalizes understanding and agrees to warfarin dosing plan.    Instructed to call the Anticoagulation Clinic for any changes, questions or concerns. (#[phone])        Charley Francisco RN      OBJECTIVE:  Recent labs: (last 7 days)     10/19/20  1041   INR 2.90*         INR assessment THER    Recheck INR In: 4 WEEKS    INR Location Clinic      Anticoagulation Summary  As of 10/19/2020    INR goal:  2.0-3.0   TTR:  70.9 % (10.8 mo)   INR used for dosin.90 (10/19/2020)   Warfarin maintenance plan:  5 mg (5 mg x 1) every day   Full warfarin instructions:  5 mg every day   Weekly warfarin total:  35 mg   No change documented:  Charley Francisco RN   Plan last modified:  Charley Francisco RN (2/3/2020)   Next INR check:     Priority:  Maintenance   Target end date:      Indications    PAF (paroxysmal atrial fibrillation) (H) [I48.0]             Anticoagulation Episode Summary     INR check location:      Preferred lab:      Send INR reminders to:  LATIA MEJIAS    Comments:        Anticoagulation Care Providers      Provider Role Specialty Phone number    Junie Golden MD Referring Family Practice [phone]

## 2020-11-16 ENCOUNTER — ANTICOAGULATION THERAPY VISIT (OUTPATIENT)
Dept: NURSING | Facility: CLINIC | Age: 75
End: 2020-11-16

## 2020-11-16 ENCOUNTER — DOCUMENTATION ONLY (OUTPATIENT)
Dept: FAMILY MEDICINE | Facility: CLINIC | Age: 75
End: 2020-11-16

## 2020-11-16 DIAGNOSIS — I48.0 PAF (PAROXYSMAL ATRIAL FIBRILLATION) (H): ICD-10-CM

## 2020-11-16 DIAGNOSIS — I48.0 PAF (PAROXYSMAL ATRIAL FIBRILLATION) (H): Primary | ICD-10-CM

## 2020-11-16 LAB — INR PPP: 2.6 (ref 0.86–1.14)

## 2020-11-16 PROCEDURE — 99207 PR NO CHARGE NURSE ONLY: CPT

## 2020-11-16 PROCEDURE — 36416 COLLJ CAPILLARY BLOOD SPEC: CPT | Performed by: FAMILY MEDICINE

## 2020-11-16 PROCEDURE — 85610 PROTHROMBIN TIME: CPT | Performed by: FAMILY MEDICINE

## 2020-11-16 NOTE — PROGRESS NOTES
ANTICOAGULATION MANAGEMENT     Patient Name:  Rian Ness  Date:  2020    ASSESSMENT /SUBJECTIVE:    Today's INR result of 2.6 is therapeutic. Goal INR of 2.0-3.0      Warfarin dose taken: Warfarin taken as instructed    Diet: No new diet changes affecting INR    Medication changes/ interactions: No new medications/supplements affecting INR    Previous INR: Therapeutic     S/S of bleeding or thromboembolism: No    New injury or illness: No    Upcoming surgery, procedure or cardioversion: No    Additional findings: None      PLAN:    Telephone call with Rian regarding INR result and instructed:     Warfarin Dosing Instructions: Continue your current warfarin dose 5 mg daily    Instructed patient to follow up no later than: 4 weeks  Lab visit scheduled    Education provided: Please call back if any changes to your diet, medications or how you've been taking warfarin and Contact the anticoagulation clinic with any changes, questions or concerns at #[phone]       Rian verbalizes understanding and agrees to warfarin dosing plan.    Instructed to call the Anticoagulation Clinic for any changes, questions or concerns. (#[phone])        Charley Francisco RN      OBJECTIVE:  Recent labs: (last 7 days)     20  1138   INR 2.60*         INR assessment THER    Recheck INR In: 4 WEEKS    INR Location Clinic      Anticoagulation Summary  As of 2020    INR goal:  2.0-3.0   TTR:  73.2 % (11.7 mo)   INR used for dosin.60 (2020)   Warfarin maintenance plan:  5 mg (5 mg x 1) every day   Full warfarin instructions:  5 mg every day   Weekly warfarin total:  35 mg   No change documented:  Charley Francisco RN   Plan last modified:  Charley Francisco RN (2/3/2020)   Next INR check:     Priority:  Maintenance   Target end date:      Indications    PAF (paroxysmal atrial fibrillation) (H) [I48.0]             Anticoagulation Episode Summary     INR check location:      Preferred lab:       Send INR reminders to:  LATIA MEJIAS    Comments:        Anticoagulation Care Providers     Provider Role Specialty Phone number    Junie Golden MD Referring Family Medicine [phone]

## 2020-11-16 NOTE — PROGRESS NOTES
ANTICOAGULATION MANAGEMENT      Rian Ness due for annual renewal of referral to anticoagulation monitoring. Order pended for your review and signature.      ANTICOAGULATION SUMMARY      Warfarin indication(s)     Atrial fibrillation    Heart valve present?  NO       Current goal range   INR: 2.0-3.0     Goal appropriate for indication? Yes, INR 2-3 appropriate for hx of DVT, PE, hypercoagulable state, Afib, LVAD, or bileaflet AVR without risk factors     Current duration of therapy Indefinite/long term therapy   Time in Therapeutic Range (TTR)  (Goal > 60%) 70.9 %       Office visit with referring provider's group within last year yes on 7/14/20       Charley Francisco RN

## 2020-11-30 ENCOUNTER — MYC REFILL (OUTPATIENT)
Dept: FAMILY MEDICINE | Facility: CLINIC | Age: 75
End: 2020-11-30

## 2020-11-30 DIAGNOSIS — I48.0 PAF (PAROXYSMAL ATRIAL FIBRILLATION) (H): ICD-10-CM

## 2020-11-30 RX ORDER — WARFARIN SODIUM 5 MG/1
5 TABLET ORAL DAILY
Qty: 93 TABLET | Refills: 0 | Status: SHIPPED | OUTPATIENT
Start: 2020-11-30 | End: 2020-12-03

## 2020-11-30 NOTE — TELEPHONE ENCOUNTER
Refill Request  Warfarin 5 mg  Current Dosing Instructions: 5 mg daily  Tabs: 93  Refills: 0  Last INR: 11/16  Next INR due: 12/16  Last OV with responsible provider: 7/14/20

## 2020-12-02 DIAGNOSIS — R10.13 EPIGASTRIC PAIN: ICD-10-CM

## 2020-12-02 DIAGNOSIS — I48.0 PAF (PAROXYSMAL ATRIAL FIBRILLATION) (H): ICD-10-CM

## 2020-12-03 RX ORDER — WARFARIN SODIUM 5 MG/1
TABLET ORAL
Qty: 90 TABLET | Refills: 0 | Status: SHIPPED | OUTPATIENT
Start: 2020-12-03 | End: 2021-06-13

## 2020-12-13 ENCOUNTER — HEALTH MAINTENANCE LETTER (OUTPATIENT)
Age: 75
End: 2020-12-13

## 2020-12-14 ENCOUNTER — ALLIED HEALTH/NURSE VISIT (OUTPATIENT)
Dept: NURSING | Facility: CLINIC | Age: 75
End: 2020-12-14
Payer: MEDICARE

## 2020-12-14 ENCOUNTER — ANTICOAGULATION THERAPY VISIT (OUTPATIENT)
Dept: NURSING | Facility: CLINIC | Age: 75
End: 2020-12-14

## 2020-12-14 DIAGNOSIS — Z23 NEED FOR PROPHYLACTIC VACCINATION AND INOCULATION AGAINST INFLUENZA: Primary | ICD-10-CM

## 2020-12-14 DIAGNOSIS — I48.0 PAF (PAROXYSMAL ATRIAL FIBRILLATION) (H): ICD-10-CM

## 2020-12-14 LAB
CAPILLARY BLOOD COLLECTION: NORMAL
INR PPP: 2.7 (ref 0.86–1.14)

## 2020-12-14 PROCEDURE — 99207 PR NO CHARGE NURSE ONLY: CPT

## 2020-12-14 PROCEDURE — 90662 IIV NO PRSV INCREASED AG IM: CPT

## 2020-12-14 PROCEDURE — 85610 PROTHROMBIN TIME: CPT | Performed by: FAMILY MEDICINE

## 2020-12-14 PROCEDURE — 36416 COLLJ CAPILLARY BLOOD SPEC: CPT | Performed by: FAMILY MEDICINE

## 2020-12-14 PROCEDURE — G0008 ADMIN INFLUENZA VIRUS VAC: HCPCS

## 2020-12-14 NOTE — PROGRESS NOTES
ANTICOAGULATION MANAGEMENT     Patient Name:  Rian Ness  Date:  2020    ASSESSMENT /SUBJECTIVE:    Today's INR result of 2.7 is therapeutic. Goal INR of 2.0-3.0      Warfarin dose taken: Warfarin taken as instructed    Diet: No new diet changes affecting INR    Medication changes/ interactions: No new medications/supplements affecting INR    Previous INR: Therapeutic     S/S of bleeding or thromboembolism: No    New injury or illness: No    Upcoming surgery, procedure or cardioversion: No    Additional findings: None      PLAN:    Telephone call with Rian regarding INR result and instructed:     Warfarin Dosing Instructions: Continue your current warfarin dose 5 mg daily    Instructed patient to follow up no later than: 4 weeks  Lab visit scheduled    Education provided: Please call back if any changes to your diet, medications or how you've been taking warfarin and Contact the anticoagulation clinic with any changes, questions or concerns at #[phone]       Rian verbalizes understanding and agrees to warfarin dosing plan.    Instructed to call the Anticoagulation Clinic for any changes, questions or concerns. (#[phone])        Charley Francisco RN      OBJECTIVE:  Recent labs: (last 7 days)     20  1042   INR 2.70*         No question data found.  Anticoagulation Summary  As of 2020    INR goal:  2.0-3.0   TTR:  78.0 % (1 y)   INR used for dosin.70 (2020)   Warfarin maintenance plan:  5 mg (5 mg x 1) every day   Full warfarin instructions:  5 mg every day   Weekly warfarin total:  35 mg   Plan last modified:  Charley Francisco RN (2/3/2020)   Next INR check:     Priority:  Maintenance   Target end date:  Indefinite    Indications    PAF (paroxysmal atrial fibrillation) (H) [I48.0]             Anticoagulation Episode Summary     INR check location:      Preferred lab:      Send INR reminders to:  LATIA MEJIAS    Comments:  referral renewed 20       Anticoagulation Care Providers     Provider Role Specialty Phone number    Junie Golden MD Referring Family Medicine [phone]

## 2021-01-11 ENCOUNTER — ANTICOAGULATION THERAPY VISIT (OUTPATIENT)
Dept: NURSING | Facility: CLINIC | Age: 76
End: 2021-01-11

## 2021-01-11 DIAGNOSIS — I48.0 PAF (PAROXYSMAL ATRIAL FIBRILLATION) (H): ICD-10-CM

## 2021-01-11 LAB
CAPILLARY BLOOD COLLECTION: NORMAL
INR PPP: 2.3 (ref 0.86–1.14)

## 2021-01-11 PROCEDURE — 36416 COLLJ CAPILLARY BLOOD SPEC: CPT | Performed by: FAMILY MEDICINE

## 2021-01-11 PROCEDURE — 85610 PROTHROMBIN TIME: CPT | Performed by: FAMILY MEDICINE

## 2021-01-11 PROCEDURE — 99207 PR NO CHARGE NURSE ONLY: CPT

## 2021-01-11 NOTE — PROGRESS NOTES
ANTICOAGULATION MANAGEMENT     Patient Name:  Rian Ness  Date:  2021    ASSESSMENT /SUBJECTIVE:    Today's INR result of 2.3 is therapeutic. Goal INR of 2.0-3.0      Warfarin dose taken: Warfarin taken as instructed    Diet: No new diet changes affecting INR    Medication changes/ interactions: No new medications/supplements affecting INR    Previous INR: Therapeutic     S/S of bleeding or thromboembolism: No    New injury or illness: No    Upcoming surgery, procedure or cardioversion: No    Additional findings: None      PLAN:    Telephone call with Rian regarding INR result and instructed:     Warfarin Dosing Instructions: Continue your current warfarin dose 5 mg daily    Instructed patient to follow up no later than: 5 weeks  Lab visit scheduled    Education provided: Please call back if any changes to your diet, medications or how you've been taking warfarin and Contact [facility] Anticoagulation: [phone]  with any changes, questions or concerns at       Rian verbalizes understanding and agrees to warfarin dosing plan.    Instructed to call the Anticoagulation Clinic for any changes, questions or concerns. (#[phone])        Charley Francisco RN      OBJECTIVE:  Recent labs: (last 7 days)     21  0947   INR 2.30*         No question data found.  Anticoagulation Summary  As of 2021    INR goal:  2.0-3.0   TTR:  82.7 % (1 y)   INR used for dosin.30 (2021)   Warfarin maintenance plan:  5 mg (5 mg x 1) every day   Full warfarin instructions:  5 mg every day   Weekly warfarin total:  35 mg   No change documented:  Charley Francisco RN   Plan last modified:  Charley Francisco RN (2/3/2020)   Next INR check:     Priority:  Maintenance   Target end date:  Indefinite    Indications    PAF (paroxysmal atrial fibrillation) (H) [I48.0]             Anticoagulation Episode Summary     INR check location:      Preferred lab:      Send INR reminders to:  LATIA  ANDOVER    Comments:  referral renewed 11/16/20      Anticoagulation Care Providers     Provider Role Specialty Phone number    Juine Golden MD Referring Family Medicine [phone]

## 2021-02-15 ENCOUNTER — ANTICOAGULATION THERAPY VISIT (OUTPATIENT)
Dept: NURSING | Facility: CLINIC | Age: 76
End: 2021-02-15

## 2021-02-15 DIAGNOSIS — I48.0 PAF (PAROXYSMAL ATRIAL FIBRILLATION) (H): ICD-10-CM

## 2021-02-15 LAB
CAPILLARY BLOOD COLLECTION: NORMAL
INR PPP: 2.9 (ref 0.86–1.14)

## 2021-02-15 PROCEDURE — 99207 PR NO CHARGE NURSE ONLY: CPT

## 2021-02-15 PROCEDURE — 85610 PROTHROMBIN TIME: CPT | Performed by: FAMILY MEDICINE

## 2021-02-15 PROCEDURE — 36416 COLLJ CAPILLARY BLOOD SPEC: CPT | Performed by: FAMILY MEDICINE

## 2021-02-15 NOTE — PROGRESS NOTES
ANTICOAGULATION MANAGEMENT     Patient Name:  Rian Ness  Date:  2/15/2021    ASSESSMENT /SUBJECTIVE:    Today's INR result of 2.90 is therapeutic. Goal INR of 2.0-3.0      Warfarin dose taken: Warfarin taken as instructed    Diet: No new diet changes affecting INR    Medication changes/ interactions: No new medications/supplements affecting INR    Previous INR: Therapeutic     S/S of bleeding or thromboembolism: No    New injury or illness: No    Upcoming surgery, procedure or cardioversion: No    Additional findings: None      PLAN:    Telephone call with Rian regarding INR result and instructed:     Warfarin Dosing Instructions: Continue your current warfarin dose 5 mg daily    Instructed patient to follow up no later than: 5 weeks  Lab visit scheduled    Education provided: Please call back if any changes to your diet, medications or how you've been taking warfarin      Rian verbalizes understanding and agrees to warfarin dosing plan.    Instructed to call the Anticoagulation Clinic for any changes, questions or concerns. (#[phone])        Charley Francisco RN      OBJECTIVE:  Recent labs: (last 7 days)     02/15/21  0901   INR 2.90*         No question data found.  Anticoagulation Summary  As of 2/15/2021    INR goal:  2.0-3.0   TTR:  90.0 % (1 y)   INR used for dosin.90 (2/15/2021)   Warfarin maintenance plan:  5 mg (5 mg x 1) every day   Full warfarin instructions:  5 mg every day   Weekly warfarin total:  35 mg   Plan last modified:  Charley Francisco RN (2/3/2020)   Next INR check:     Priority:  Maintenance   Target end date:  Indefinite    Indications    PAF (paroxysmal atrial fibrillation) (H) [I48.0]             Anticoagulation Episode Summary     INR check location:      Preferred lab:      Send INR reminders to:  LATIA MEJIAS    Comments:  referral renewed 20      Anticoagulation Care Providers     Provider Role Specialty Phone number    Junie Golden MD  [facility] [phone]

## 2021-02-17 ENCOUNTER — OFFICE VISIT (OUTPATIENT)
Dept: OPHTHALMOLOGY | Facility: CLINIC | Age: 76
End: 2021-02-17
Payer: MEDICARE

## 2021-02-17 DIAGNOSIS — H43.813 POSTERIOR VITREOUS DETACHMENT, BILATERAL: ICD-10-CM

## 2021-02-17 DIAGNOSIS — Z96.1 PSEUDOPHAKIA: Primary | ICD-10-CM

## 2021-02-17 DIAGNOSIS — Z01.01 ENCOUNTER FOR EXAMINATION OF EYES AND VISION WITH ABNORMAL FINDINGS: ICD-10-CM

## 2021-02-17 DIAGNOSIS — H52.4 PRESBYOPIA: ICD-10-CM

## 2021-02-17 PROCEDURE — 92014 COMPRE OPH EXAM EST PT 1/>: CPT | Performed by: OPHTHALMOLOGY

## 2021-02-17 PROCEDURE — 92015 DETERMINE REFRACTIVE STATE: CPT | Mod: GY | Performed by: OPHTHALMOLOGY

## 2021-02-17 ASSESSMENT — CONF VISUAL FIELD
OD_NORMAL: 1
OS_NORMAL: 1

## 2021-02-17 ASSESSMENT — REFRACTION_WEARINGRX
OS_AXIS: 180
OD_CYLINDER: +1.25
OD_SPHERE: -0.50
SPECS_TYPE: BIFOCAL
SPECS_TYPE: BIFOCAL
OS_AXIS: 177
OD_AXIS: 180
OD_CYLINDER: +1.50
OS_CYLINDER: +1.50
OS_SPHERE: -1.00
OD_AXIS: 180
OS_CYLINDER: +1.25
OD_SPHERE: -1.00
OS_SPHERE: -1.00
OS_ADD: +2.50
OD_ADD: +2.50

## 2021-02-17 ASSESSMENT — TONOMETRY
OD_IOP_MMHG: 17
IOP_METHOD: APPLANATION
OS_IOP_MMHG: 18

## 2021-02-17 ASSESSMENT — REFRACTION_MANIFEST
OD_AXIS: 005
OD_SPHERE: -0.75
OD_ADD: +2.50
OS_ADD: +2.50
OS_AXIS: 175
OD_CYLINDER: +1.50
OS_SPHERE: -1.25
OS_CYLINDER: +2.00

## 2021-02-17 ASSESSMENT — SLIT LAMP EXAM - LIDS
COMMENTS: 2+ DERMATOCHALASIS - UPPER LID
COMMENTS: 2+ DERMATOCHALASIS - UPPER LID

## 2021-02-17 ASSESSMENT — CUP TO DISC RATIO
OS_RATIO: 0.4
OD_RATIO: 0.2

## 2021-02-17 ASSESSMENT — VISUAL ACUITY
OD_CC: J2
CORRECTION_TYPE: GLASSES
OS_CC: J1
OS_CC: 20/30+2
METHOD: SNELLEN - LINEAR
OD_CC: 20/25

## 2021-02-17 ASSESSMENT — EXTERNAL EXAM - LEFT EYE: OS_EXAM: 2+ BROW PTOSIS, PROLAPSED FAT PADS: UPPER, LOWER

## 2021-02-17 ASSESSMENT — EXTERNAL EXAM - RIGHT EYE: OD_EXAM: 2+ BROW PTOSIS, PROLAPSED FAT PADS: UPPER, LOWER

## 2021-02-17 NOTE — PROGRESS NOTES
" Current Eye Medications:       Subjective:  Comprehensive eye exam.   Pt reports that he is sees well. Pt glasses are scratched from fall he took awhile ago, scratches are on periphery of lens so sees still sees well with these glasses however they do need to be straightened., pt also has older pair he wears and sees well in these.    \"Face planted\" carrying a box of water bottles; small nasal fx and pinkie dislocation.     Objective:  See Ophthalmology Exam.       Assessment:  Stable eye exam.      ICD-10-CM    1. 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 Posterior vitreous detachment, bilateral  H43.813    3. Encounter for examination of eyes and vision with abnormal findings  Z01.01    4. Presbyopia  H52.4 REFRACTIVE STATUS     EYE EXAM (SIMPLE-NONBILLABLE)        Plan:  Glasses Rx given - optional  Use artificial tears up to 4 times daily both eyes as needed (Refresh Tears, Systane Ultra/Balance, or Theratears)   Possible clouding of posterior capsule both eyes discussed.  Call in October 2021 for an appointment in February 2022 for Complete Exam    Dr. Abebe [phone]      "

## 2021-02-17 NOTE — LETTER
"    2/17/2021         RE: Rian Ness  [address] [place] [facility] [address]        Dear Colleague,    Thank you for referring your patient, Rian Ness, to the [facility]. Please see a copy of my visit note below.     Current Eye Medications:       Subjective:  Comprehensive eye exam.   Pt reports that he is sees well. Pt glasses are scratched from fall he took awhile ago, scratches are on periphery of lens so sees still sees well with these glasses however they do need to be straightened., pt also has older pair he wears and sees well in these.    \"Face planted\" carrying a box of water bottles; small nasal fx and pinkie dislocation.     Objective:  See Ophthalmology Exam.       Assessment:  Stable eye exam.      ICD-10-CM    1. 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 Posterior vitreous detachment, bilateral  H43.813    3. Encounter for examination of eyes and vision with abnormal findings  Z01.01    4. Presbyopia  H52.4 REFRACTIVE STATUS     EYE EXAM (SIMPLE-NONBILLABLE)        Plan:  Glasses Rx given - optional  Use artificial tears up to 4 times daily both eyes as needed (Refresh Tears, Systane Ultra/Balance, or Theratears)   Possible clouding of posterior capsule both eyes discussed.  Call in October 2021 for an appointment in February 2022 for Complete Exam    Dr. Abebe [phone]          Again, thank you for allowing me to participate in the care of your patient.        Sincerely,        Jared Abebe MD    "

## 2021-02-17 NOTE — PATIENT INSTRUCTIONS
Glasses Rx given - optional  Use artificial tears up to 4 times daily both eyes as needed (Refresh Tears, Systane Ultra/Balance, or Theratears)   Possible clouding of posterior capsule both eyes discussed.  Call in October 2021 for an appointment in February 2022 for Complete Exam    Dr. Abebe [phone]

## 2021-03-11 ENCOUNTER — IMMUNIZATION (OUTPATIENT)
Dept: NURSING | Facility: CLINIC | Age: 76
End: 2021-03-11
Payer: MEDICARE

## 2021-03-11 PROCEDURE — 0001A PR COVID VAC PFIZER DIL RECON 30 MCG/0.3 ML IM: CPT

## 2021-03-11 PROCEDURE — 91300 PR COVID VAC PFIZER DIL RECON 30 MCG/0.3 ML IM: CPT

## 2021-03-18 ENCOUNTER — TELEPHONE (OUTPATIENT)
Dept: FAMILY MEDICINE | Facility: CLINIC | Age: 76
End: 2021-03-18

## 2021-03-18 DIAGNOSIS — Z12.5 SCREENING FOR PROSTATE CANCER: Primary | ICD-10-CM

## 2021-03-18 DIAGNOSIS — I10 HYPERTENSION GOAL BP (BLOOD PRESSURE) < 140/90: ICD-10-CM

## 2021-03-18 DIAGNOSIS — J30.2 SEASONAL ALLERGIC RHINITIS, UNSPECIFIED TRIGGER: ICD-10-CM

## 2021-03-18 NOTE — LETTER
March 19, 2021    Rian Ness  [address] [facility] [address]    Dear Rian,       We recently received a refill request for loratadine (CLARITIN) 10 MG tablet.  We have refilled this for a one time 90 day supply only because you are due for a:    Wellness exam and labs office visit      Please call at your earliest convenience so that there will not be a delay with your future refills.          Thank you,   Your [facility] Team/  [phone]

## 2021-03-19 RX ORDER — LORATADINE 10 MG/1
10 TABLET ORAL DAILY
Qty: 90 TABLET | Refills: 0 | Status: SHIPPED | OUTPATIENT
Start: 2021-03-19 | End: 2021-06-17

## 2021-03-22 ENCOUNTER — ANTICOAGULATION THERAPY VISIT (OUTPATIENT)
Dept: NURSING | Facility: CLINIC | Age: 76
End: 2021-03-22

## 2021-03-22 DIAGNOSIS — Z12.5 SCREENING FOR PROSTATE CANCER: ICD-10-CM

## 2021-03-22 DIAGNOSIS — I48.0 PAF (PAROXYSMAL ATRIAL FIBRILLATION) (H): ICD-10-CM

## 2021-03-22 DIAGNOSIS — I10 HYPERTENSION GOAL BP (BLOOD PRESSURE) < 140/90: ICD-10-CM

## 2021-03-22 LAB
ANION GAP SERPL CALCULATED.3IONS-SCNC: 2 MMOL/L (ref 3–14)
BUN SERPL-MCNC: 21 MG/DL (ref 7–30)
CALCIUM SERPL-MCNC: 9 MG/DL (ref 8.5–10.1)
CHLORIDE SERPL-SCNC: 108 MMOL/L (ref 94–109)
CO2 SERPL-SCNC: 30 MMOL/L (ref 20–32)
CREAT SERPL-MCNC: 1.06 MG/DL (ref 0.66–1.25)
GFR SERPL CREATININE-BSD FRML MDRD: 67 ML/MIN/{1.73_M2}
GLUCOSE SERPL-MCNC: 116 MG/DL (ref 70–99)
INR PPP: 2.4 (ref 0.86–1.14)
POTASSIUM SERPL-SCNC: 4.6 MMOL/L (ref 3.4–5.3)
PSA SERPL-ACNC: 0.09 UG/L (ref 0–4)
SODIUM SERPL-SCNC: 140 MMOL/L (ref 133–144)

## 2021-03-22 PROCEDURE — 80048 BASIC METABOLIC PNL TOTAL CA: CPT | Performed by: FAMILY MEDICINE

## 2021-03-22 PROCEDURE — 36416 COLLJ CAPILLARY BLOOD SPEC: CPT | Performed by: FAMILY MEDICINE

## 2021-03-22 PROCEDURE — 85610 PROTHROMBIN TIME: CPT | Performed by: FAMILY MEDICINE

## 2021-03-22 PROCEDURE — G0103 PSA SCREENING: HCPCS | Performed by: FAMILY MEDICINE

## 2021-03-22 PROCEDURE — 99207 PR NO CHARGE NURSE ONLY: CPT

## 2021-03-22 NOTE — PROGRESS NOTES
ANTICOAGULATION MANAGEMENT     Patient Name:  Rian Ness  Date:  3/22/2021    ASSESSMENT /SUBJECTIVE:    Today's INR result of 2.40 is therapeutic. Goal INR of 2.0-3.0      Warfarin dose taken: Warfarin taken as instructed    Diet: No new diet changes affecting INR    Medication changes/ interactions: No new medications/supplements affecting INR    Previous INR: Therapeutic     S/S of bleeding or thromboembolism: No    New injury or illness: No    Upcoming surgery, procedure or cardioversion: No    Additional findings: None      PLAN:    Telephone call with Rian regarding INR result and instructed:     Warfarin Dosing Instructions: Continue your current warfarin dose 5 mg daily    Instructed patient to follow up no later than: 5 weeks  Lab visit scheduled    Education provided: Please call back if any changes to your diet, medications or how you've been taking warfarin      Rian verbalizes understanding and agrees to warfarin dosing plan.    Instructed to call the Anticoagulation Clinic for any changes, questions or concerns. (#[phone])        Charley Francisco RN      OBJECTIVE:  Recent labs: (last 7 days)     21  0938   INR 2.40*         No question data found.  Anticoagulation Summary  As of 3/22/2021    INR goal:  2.0-3.0   TTR:  90.0 % (1 y)   INR used for dosin.40 (3/22/2021)   Warfarin maintenance plan:  5 mg (5 mg x 1) every day   Full warfarin instructions:  5 mg every day   Weekly warfarin total:  35 mg   Plan last modified:  Charley Francisco RN (2/3/2020)   Next INR check:     Priority:  Maintenance   Target end date:  Indefinite    Indications    PAF (paroxysmal atrial fibrillation) (H) [I48.0]             Anticoagulation Episode Summary     INR check location:      Preferred lab:      Send INR reminders to:  LATIA MEJIAS    Comments:  referral renewed 20      Anticoagulation Care Providers     Provider Role Specialty Phone number    Junie Golden MD  [facility] ([place], [place]) [facility] [phone]

## 2021-03-24 NOTE — PROGRESS NOTES
SUBJECTIVE:   Rian Ness is a 75 year old male who presents for Preventive Visit.      Patient has been advised of split billing requirements and indicates understanding: Yes   Are you in the first 12 months of your Medicare coverage?  No    Healthy Habits:    In general, how would you rate your overall health?  Fair (joint pain )    Frequency of exercise:  None    Taking medications regularly:  Yes    Barriers to taking medications:  None    Medication side effects:  None    Ability to successfully perform activities of daily living:  No assistance needed    Home Safety:  No safety concerns identified    Hearing Impairment:  Need to ask people to speak up or repeat themselves    In the past 6 months, have you been bothered by leaking of urine?  No    In general, how would you rate your overall mental or emotional health?  Fair      PHQ-2 Total Score:    Do you feel safe in your environment? Yes    Have you ever done Advance Care Planning? (For example, a Health Directive, POLST, or a discussion with a medical provider or your loved ones about your wishes): No, advance care planning information given to patient to review.  Patient plans to discuss their wishes with loved ones or provider.         Fall risk  Fallen 2 or more times in the past year?: No  Any fall with injury in the past year?: Yes    Cognitive Screening   1) Repeat 3 items (Leader, Season, Table)    2) Clock draw: NORMAL  3) 3 item recall: Recalls 3 objects  Results: NORMAL clock, 1-2 items recalled: COGNITIVE IMPAIRMENT LESS LIKELY    Mini-CogTM Copyright REYNALDO Ruiz. Licensed by the author for use in [facility]; reprinted with permission (phuong@.[facility]). All rights reserved.      Do you have sleep apnea, excessive snoring or daytime drowsiness?: daytime drowsiness     Reviewed and updated as needed this visit by clinical staff  Tobacco  Allergies  Meds  Problems  Med Hx  Surg Hx  Fam Hx  Soc Hx          Reviewed and updated as  needed this visit by Provider  Tobacco  Allergies  Meds  Problems  Med Hx  Surg Hx  Fam Hx         Social History     Tobacco Use     Smoking status: Former Smoker     Packs/day: 1.50     Years: 34.00     Pack years: 51.00     Types: Cigarettes     Quit date: 1984     Years since quittin.2     Smokeless tobacco: Never Used     Tobacco comment: Nonsmoking household   Substance Use Topics     Alcohol use: Yes     Comment: very lightly     If you drink alcohol do you typically have >3 drinks per day or >7 drinks per week? No    Alcohol Use 2021   Prescreen: >3 drinks/day or >7 drinks/week? No               Current providers sharing in care for this patient include:   Patient Care Team:  Junie Golden MD as PCP - General (Family Practice)  Junie Golden MD as Assigned PCP  Yash Stevens MD as MD (Cardiology)  Jared Abebe MD as Assigned Surgical Provider  Eriberto Batista DO as Assigned Musculoskeletal Provider  Yash Stevens MD as Assigned Heart and Vascular Provider    The following health maintenance items are reviewed in Epic and correct as of today:  Health Maintenance   Topic Date Due     HEPATITIS C SCREENING  Never done     ZOSTER IMMUNIZATION (1 of 2) Never done     FALL RISK ASSESSMENT  10/11/2020     BMP  2021     ADVANCE CARE PLANNING  2021     EYE EXAM  2022     PSA  2022     MEDICARE ANNUAL WELLNESS VISIT  2022     LIPID  11/15/2022     DTAP/TDAP/TD IMMUNIZATION (3 - Td) 2022     PHQ-2  Completed     INFLUENZA VACCINE  Completed     Pneumococcal Vaccine: 65+ Years  Completed     COVID-19 Vaccine  Completed     Pneumococcal Vaccine: Pediatrics (0 to 5 Years) and At-Risk Patients (6 to 64 Years)  Aged Out     IPV IMMUNIZATION  Aged Out     MENINGITIS IMMUNIZATION  Aged Out     HEPATITIS B IMMUNIZATION  Aged Out       Family history of prostate cancer: pt has prostate cancer  Last PSA:   PSA   Date Value Ref Range Status    03/22/2021 0.09 0 - 4 ug/L Final     Comment:     Assay Method:  Chemiluminescence using Siemens Vista analyzer     Doing self testicular exam: NO     Family history of colon cancer:No  Last colonscopy: 2009     Family history of CAD:No  Last Cholesterol:   Lab Results   Component Value Date    CHOL 165 11/15/2017     Lab Results   Component Value Date    HDL 46 11/15/2017     Lab Results   Component Value Date    LDL 93 11/15/2017     Lab Results   Component Value Date    TRIG 130 11/15/2017     Lab Results   Component Value Date    CHOLHDLRATIO 4.9 11/26/2012          Immunizations:    Td tdap 12/12,  Covid:completed 4/1/2021  Flu 12/2020  Shingrix: recommended after 4/15/2021  Pneumovax completed,         Seat Belt:YES   Sunscreen use: YES  Calcium Intake: adeq  Health Care Directive:NO   Sexually Active:YES      Current contraception: none     Current Concerns: no concerns          Patient Ed:  Reviewed health maintenance including diet, regular exercise, and periodic exams.     The risks, benefits, and treatment options of prescribed medications or other treatments have been discussed with the patient.  The patient should call or schedule a follow up appt if no improvement or other problems.   Labs reviewed in EPIC  BP Readings from Last 3 Encounters:   04/08/21 125/82   07/14/20 114/62   06/30/20 110/72    Wt Readings from Last 3 Encounters:   04/08/21 97 kg (213 lb 12.8 oz)   07/14/20 95.6 kg (210 lb 12.8 oz)   06/30/20 98 kg (216 lb)                  Patient Active Problem List   Diagnosis     Testicular hypofunction     Osteoarthritis, knee     Malignant neoplasm prostate (H)     CARDIOVASCULAR SCREENING; LDL GOAL LESS THAN 130     Advanced directives, counseling/discussion     Status post total knee replacement     AC (acromioclavicular) joint arthritis     Biceps tendonitis     Pseudophakia,ou     Hypertension goal BP (blood pressure) < 140/90     Gastroesophageal reflux disease without esophagitis      "Posterior vitreous detachment, bilateral     S/P complete repair of rotator cuff     Paroxysmal atrial fibrillation (H)     Past Surgical History:   Procedure Laterality Date     ABDOMEN SURGERY       ARTHROSCOPY KNEE RT/LT      right     ARTHROSCOPY KNEE RT/LT      left     C TOTAL KNEE ARTHROPLASTY  10/11    right knee     CATARACT IOL, RT/LT       COLONOSCOPY       PHACOEMULSIFICATION WITH STANDARD INTRAOCULAR LENS IMPLANT  2010; 2010    left eye; right eye     SUPRAPUBIC PROSTATECTOMY       VASCULAR SURGERY         Social History     Tobacco Use     Smoking status: Former Smoker     Packs/day: 1.50     Years: 34.00     Pack years: 51.00     Types: Cigarettes     Quit date: 1984     Years since quittin.2     Smokeless tobacco: Never Used     Tobacco comment: Nonsmoking household   Substance Use Topics     Alcohol use: Yes     Comment: very lightly     Family History   Problem Relation Age of Onset     Cancer Mother         leukemia     Depression Other      Depression Other      Prostate Cancer Brother      Prostate Cancer Brother      Prostate Cancer Brother          Current Outpatient Medications   Medication Sig Dispense Refill     DAILY MULTIVITAMIN PO 1 tab daily.        loratadine (CLARITIN) 10 MG tablet Take 1 tablet (10 mg) by mouth daily 90 tablet 0     MAGNESIUM OR 1 tablet daily       metoprolol succinate ER (TOPROL-XL) 100 MG 24 hr tablet Take 1 tablet (100 mg) by mouth daily 90 tablet 3     omeprazole (PRILOSEC) 20 MG DR capsule TAKE 1 CAPSULE TWICE DAILY 180 capsule 3     warfarin ANTICOAGULANT (COUMADIN) 5 MG tablet TAKE 1 TABLET EVERY DAY 90 tablet 0         Review of Systems  Constitutional, HEENT, cardiovascular, pulmonary, gi and gu systems are negative, except as otherwise noted.    OBJECTIVE:   /82   Pulse 90   Temp 98.3  F (36.8  C) (Oral)   Resp 20   Ht 1.676 m (5' 6\")   Wt 97 kg (213 lb 12.8 oz)   BMI 34.51 kg/m   Estimated body mass index is 34.51 " "kg/m  as calculated from the following:    Height as of this encounter: 1.676 m (5' 6\").    Weight as of this encounter: 97 kg (213 lb 12.8 oz).  Physical Exam  GENERAL: healthy, alert and no distress  EYES: Eyes grossly normal to inspection, PERRL and conjunctivae and sclerae normal  HENT: ear canals and TM's normal, nose and mouth without ulcers or lesions  NECK: no adenopathy, no asymmetry, masses, or scars and thyroid normal to palpation  RESP: lungs clear to auscultation - no rales, rhonchi or wheezes  CV: regular rate and rhythm, normal S1 S2, no S3 or S4, no murmur, click or rub, no peripheral edema and peripheral pulses strong  ABDOMEN: soft, nontender, no hepatosplenomegaly, no masses and bowel sounds normal  MS: no gross musculoskeletal defects noted, no edema  SKIN: no suspicious lesions or rashes  NEURO: Normal strength and tone, mentation intact and speech normal  PSYCH: mentation appears normal, affect normal/bright    Diagnostic Test Results:  Labs reviewed in Epic    ASSESSMENT / PLAN:   (Z00.00) Encounter for Medicare annual wellness exam  (primary encounter diagnosis)  Comment: preventive needs reviewed   Plan: see orders in Epic.     (I48.0) Paroxysmal atrial fibrillation (H)  Comment: stable on metoprolol  Plan: in reg rhythm today, follows with cardiology, appt 5/4/2021    (C61) Malignant neoplasm prostate (H)  Comment: psa below 0.1  Plan: released from urology, continue yearly psa    (I10) Hypertension goal BP (blood pressure) < 140/90  Comment: to goal  Plan: on metoprolol, refilled by cardiology    (K21.9) Gastroesophageal reflux disease without esophagitis  Comment: controlled  Plan: Refill x 1 yr omeprazole 20 mg daily        Patient has been advised of split billing requirements and indicates understanding: Yes  COUNSELING:  Reviewed preventive health counseling, as reflected in patient instructions  Special attention given to:       Regular exercise       Healthy " "diet/nutrition    Estimated body mass index is 34.51 kg/m  as calculated from the following:    Height as of this encounter: 1.676 m (5' 6\").    Weight as of this encounter: 97 kg (213 lb 12.8 oz).    Weight management plan: Discussed healthy diet and exercise guidelines    He reports that he quit smoking about 37 years ago. His smoking use included cigarettes. He has a 51.00 pack-year smoking history. He has never used smokeless tobacco.      Appropriate preventive services were discussed with this patient, including applicable screening as appropriate for cardiovascular disease, diabetes, osteopenia/osteoporosis, and glaucoma.  As appropriate for age/gender, discussed screening for colorectal cancer, prostate cancer, breast cancer, and cervical cancer. Checklist reviewing preventive services available has been given to the patient.    Reviewed patients plan of care and provided an AVS. The Intermediate Care Plan ( asthma action plan, low back pain action plan, and migraine action plan) for Rian meets the Care Plan requirement. This Care Plan has been established and reviewed with the Patient.    Counseling Resources:  ATP IV Guidelines  Pooled Cohorts Equation Calculator  Breast Cancer Risk Calculator  Breast Cancer: Medication to Reduce Risk  FRAX Risk Assessment  ICSI Preventive Guidelines  Dietary Guidelines for Americans, 2010  [employer]'s MyPlate  ASA Prophylaxis  Lung CA Screening    Junie Golden MD  [facility]    Identified Health Risks:  "

## 2021-03-24 NOTE — PATIENT INSTRUCTIONS
Patient Education   Personalized Prevention Plan  You are due for the preventive services outlined below.  Your care team is available to assist you in scheduling these services.  If you have already completed any of these items, please share that information with your care team to update in your medical record.  Health Maintenance Due   Topic Date Due     Hepatitis C Screening  Never done     Zoster (Shingles) Vaccine (1 of 2) Never done     FALL RISK ASSESSMENT  10/11/2020

## 2021-04-01 ENCOUNTER — IMMUNIZATION (OUTPATIENT)
Dept: NURSING | Facility: CLINIC | Age: 76
End: 2021-04-01
Attending: FAMILY MEDICINE
Payer: MEDICARE

## 2021-04-01 PROCEDURE — 91300 PR COVID VAC PFIZER DIL RECON 30 MCG/0.3 ML IM: CPT

## 2021-04-01 PROCEDURE — 0002A PR COVID VAC PFIZER DIL RECON 30 MCG/0.3 ML IM: CPT

## 2021-04-08 ENCOUNTER — OFFICE VISIT (OUTPATIENT)
Dept: FAMILY MEDICINE | Facility: CLINIC | Age: 76
End: 2021-04-08
Payer: MEDICARE

## 2021-04-08 VITALS
SYSTOLIC BLOOD PRESSURE: 125 MMHG | HEART RATE: 90 BPM | WEIGHT: 213.8 LBS | RESPIRATION RATE: 20 BRPM | BODY MASS INDEX: 34.36 KG/M2 | HEIGHT: 66 IN | DIASTOLIC BLOOD PRESSURE: 82 MMHG | TEMPERATURE: 98.3 F

## 2021-04-08 DIAGNOSIS — I10 HYPERTENSION GOAL BP (BLOOD PRESSURE) < 140/90: ICD-10-CM

## 2021-04-08 DIAGNOSIS — C61 MALIGNANT NEOPLASM PROSTATE (H): ICD-10-CM

## 2021-04-08 DIAGNOSIS — K21.9 GASTROESOPHAGEAL REFLUX DISEASE WITHOUT ESOPHAGITIS: ICD-10-CM

## 2021-04-08 DIAGNOSIS — I48.0 PAROXYSMAL ATRIAL FIBRILLATION (H): ICD-10-CM

## 2021-04-08 DIAGNOSIS — Z00.00 ENCOUNTER FOR MEDICARE ANNUAL WELLNESS EXAM: Primary | ICD-10-CM

## 2021-04-08 PROCEDURE — G0439 PPPS, SUBSEQ VISIT: HCPCS | Performed by: FAMILY MEDICINE

## 2021-04-08 ASSESSMENT — ACTIVITIES OF DAILY LIVING (ADL): CURRENT_FUNCTION: NO ASSISTANCE NEEDED

## 2021-04-08 ASSESSMENT — MIFFLIN-ST. JEOR: SCORE: 1637.54

## 2021-04-08 NOTE — NURSING NOTE
"Chief Complaint   Patient presents with     Wellness Visit       Initial /82   Pulse 90   Temp 98.3  F (36.8  C) (Oral)   Resp 20   Ht 1.676 m (5' 6\")   Wt 97 kg (213 lb 12.8 oz)   BMI 34.51 kg/m   Estimated body mass index is 34.51 kg/m  as calculated from the following:    Height as of this encounter: 1.676 m (5' 6\").    Weight as of this encounter: 97 kg (213 lb 12.8 oz).  Medication Reconciliation: complete  Mat Obrien CMA    "

## 2021-04-26 ENCOUNTER — ANTICOAGULATION THERAPY VISIT (OUTPATIENT)
Dept: NURSING | Facility: CLINIC | Age: 76
End: 2021-04-26

## 2021-04-26 DIAGNOSIS — I48.0 PAF (PAROXYSMAL ATRIAL FIBRILLATION) (H): ICD-10-CM

## 2021-04-26 DIAGNOSIS — I48.0 PAROXYSMAL ATRIAL FIBRILLATION (H): ICD-10-CM

## 2021-04-26 LAB
CAPILLARY BLOOD COLLECTION: NORMAL
INR PPP: 2.3 (ref 0.86–1.14)

## 2021-04-26 PROCEDURE — 36416 COLLJ CAPILLARY BLOOD SPEC: CPT | Performed by: FAMILY MEDICINE

## 2021-04-26 PROCEDURE — 99207 PR NO CHARGE NURSE ONLY: CPT

## 2021-04-26 PROCEDURE — 85610 PROTHROMBIN TIME: CPT | Performed by: FAMILY MEDICINE

## 2021-04-26 NOTE — PROGRESS NOTES
ANTICOAGULATION MANAGEMENT     Patient Name:  Rian Ness  Date:  2021    ASSESSMENT /SUBJECTIVE:    Today's INR result of 2.30 is therapeutic. Goal INR of 2.0-3.0      Warfarin dose taken: Warfarin taken as instructed    Diet: No new diet changes affecting INR    Medication changes/ interactions: No new medications/supplements affecting INR    Previous INR: Therapeutic     S/S of bleeding or thromboembolism: No    New injury or illness: No    Upcoming surgery, procedure or cardioversion: No    Additional findings: None      PLAN:    Telephone call with Rian regarding INR result and instructed:     Warfarin Dosing Instructions: Continue your current warfarin dose 5 mg daily    Instructed patient to follow up no later than: 5 weeks  Lab visit scheduled    Education provided: Please call back if any changes to your diet, medications or how you've been taking warfarin      Rian verbalizes understanding and agrees to warfarin dosing plan.    Instructed to call the Anticoagulation Clinic for any changes, questions or concerns. (#[phone])        Charley Francisco RN      OBJECTIVE:  Recent labs: (last 7 days)     21  1000   INR 2.30*         No question data found.  Anticoagulation Summary  As of 2021    INR goal:  2.0-3.0   TTR:  90.0 % (1 y)   INR used for dosin.30 (2021)   Warfarin maintenance plan:  5 mg (5 mg x 1) every day   Full warfarin instructions:  5 mg every day   Weekly warfarin total:  35 mg   Plan last modified:  Charley Francisco RN (2/3/2020)   Next INR check:     Priority:  Maintenance   Target end date:  Indefinite    Indications    Paroxysmal atrial fibrillation (H) [I48.0]             Anticoagulation Episode Summary     INR check location:      Preferred lab:      Send INR reminders to:  LATIA MEJIAS    Comments:  referral renewed 20      Anticoagulation Care Providers     Provider Role Specialty Phone number    Junie Golden MD Referring  Family Medicine [phone]

## 2021-05-04 ENCOUNTER — OFFICE VISIT (OUTPATIENT)
Dept: CARDIOLOGY | Facility: CLINIC | Age: 76
End: 2021-05-04
Payer: MEDICARE

## 2021-05-04 VITALS
HEART RATE: 96 BPM | WEIGHT: 215 LBS | DIASTOLIC BLOOD PRESSURE: 86 MMHG | OXYGEN SATURATION: 99 % | SYSTOLIC BLOOD PRESSURE: 130 MMHG | BODY MASS INDEX: 34.7 KG/M2

## 2021-05-04 DIAGNOSIS — E66.811 CLASS 1 OBESITY DUE TO EXCESS CALORIES WITHOUT SERIOUS COMORBIDITY WITH BODY MASS INDEX (BMI) OF 34.0 TO 34.9 IN ADULT: ICD-10-CM

## 2021-05-04 DIAGNOSIS — E66.09 CLASS 1 OBESITY DUE TO EXCESS CALORIES WITHOUT SERIOUS COMORBIDITY WITH BODY MASS INDEX (BMI) OF 34.0 TO 34.9 IN ADULT: ICD-10-CM

## 2021-05-04 DIAGNOSIS — Z85.46 HISTORY OF PROSTATE CANCER: ICD-10-CM

## 2021-05-04 DIAGNOSIS — I10 HYPERTENSION, WELL CONTROLLED: ICD-10-CM

## 2021-05-04 DIAGNOSIS — I48.19 PERSISTENT ATRIAL FIBRILLATION (H): Primary | ICD-10-CM

## 2021-05-04 PROCEDURE — 99214 OFFICE O/P EST MOD 30 MIN: CPT | Performed by: INTERNAL MEDICINE

## 2021-05-04 NOTE — PATIENT INSTRUCTIONS
Thank you for coming to the [facility]; please note the following instructions:    1. Dr. Yash Stevens is recommending to see you on an as needed basis regarding your heart; please follow up with your primary care provider.  It was a pleasuring seeing you today at the [facility] the [facility].              If you have any questions regarding your visit please contact your care team:     Cardiology  Telephone Number   Luda SIMPSON., RN  Brynn HANEY,RN  Miesha MAIER, MILVIA SHAH, CASEY JACQUES, ARTIEN   [phone]   (select option 1)    *After hours: [phone]     For scheduling appts:     [phone] or    [phone] (select option 1)    *After hours: [phone]     For the Device Clinic (Pacemakers and ICD's)  RN's :  Ana Cam   During business hours: [phone]    *After business hours:  [phone] (select option 4)      Normal test result notifications will be released via [employer] or mailed within 7 business days.  All other test results, will be communicated via telephone once reviewed by your cardiologist.    If you need a medication refill please contact your pharmacy.  Please allow 3 business days for your refill to be completed.    As always, thank you for trusting us with your health care needs!

## 2021-05-04 NOTE — NURSING NOTE
"Chief Complaint   Patient presents with     Atrial Fib     per patient sob., lightheaded at times        Initial /86 (BP Location: Left arm, Patient Position: Sitting, Cuff Size: Adult Large)   Pulse 96   Wt 97.5 kg (215 lb)   SpO2 99%   BMI 34.70 kg/m   Estimated body mass index is 34.7 kg/m  as calculated from the following:    Height as of 4/8/21: 1.676 m (5' 6\").    Weight as of this encounter: 97.5 kg (215 lb)..  BP completed using cuff size: large    Candida Chen L.P.N.    "

## 2021-05-04 NOTE — LETTER
5/4/2021      RE: Rian Ness  [address] [facility] [address]       Dear Colleague,    Thank you for the opportunity to participate in the care of your patient, Rian Ness, at the [facility]. Please see a copy of my visit note below.    Referring provider:Junie Godlen MD    HPI: . Rian Ness is a 74 year old  male with PMH significant for prostate carcinoma and hypertension.    The patient was recently found to be in atrial fibrillation when he presented for regular follow-up with Dr. Golden on 10/11/2019.  The patient was feeling mild palpitations on that day.  He reports on and off similar palpitations for over a year but this was never caught on EKG.  The patient denies associated chest pain, dyspnea on exertion, dizziness or syncope.  He is not exactly sure how long the episodes last. He says sometimes they occur two day in a row, sometimes once every few weeks. The patient reports he is able to do his ADLs even though he feels palpitations.  He states` they do not bother me too much`. TSH was checked which was normal.  Echo obtained on 10/17/2018 showed a structurally normal heart with normal EF with no valvular disease. He is being seen today for new onset atrial fibrillation.    The patient has a history of smoking for 51 pack years quit date 1984.  No prior history of  diabetes, hyperlipidemia, atrial fibrillation or any other cardiac disease.  The patient has been on metoprolol 50 mg for hypertension control.  Patient was started on rivaroxaban 10/11.  The patient recently underwent varicose vein surgery on the left leg.  He is expecting to undergo similar operation for the right leg as well.    The patient overall feels well and denies exertional chest pain.  He reports dyspnea on exertion which is not new.  He is a retired  he still tries to stay active.    I  reviewed patient's EKG dated 10/11/2019 which shows atrial fibrillation versus flutter heart rate 89 bpm.  I reviewed today's EKG in clinic today which shows sinus rhythm with one PAC, normal HI/QRS and QTc intervals.  No ST-T wave changes.  Normal axis.    Medications, personal, family, and social history reviewed with patient and revised.    Interval history 5/5/2020:  I called and talked to the patient on the phone today for 6-month follow-up.  The patient reports overall feeling well.  In the interim he switched from Xarelto to warfarin.  INR is within the therapeutic range.  He is on metoprolol 100 mg which I have increased 6 months ago.  The patient tolerated the medication well and he no longer feels palpitations.  Denies chest pain,  shortness of breath, fever or cough.  He reports mild lightheadedness and dizziness and spring allergies.  He is a former smoker.  Patient monitors blood pressure at home and 3 days ago his blood pressure was 136/70 mmHg at home.  His current medications are metoprolol 100 mg, omeprazole and warfarin.  Labs from 3/30/2020 shows normal BMP.     Interval history 5/4/2021:  Patient is being seen today for annual follow-up.  He reports doing well over the last 1 year.  Denies palpitations, chest pain, shortness of breath.  Patient tells me that palpitations resolved once he started using metoprolol 100 mg.  Patient had varicose vein surgery on both legs now.  He is wearing compression socks.  He tells me that surgery helped a little bit.  He works in his garage on trucks.  Does not smoke.  No alcohol abuse.    PAST MEDICAL HISTORY:  Past Medical History:   Diagnosis Date     Arthritis      Cancer (H)      Depressive disorder      HTN        CURRENT MEDICATIONS:  Current Outpatient Medications   Medication Sig Dispense Refill     DAILY MULTIVITAMIN PO 1 tab daily.        loratadine (CLARITIN) 10 MG tablet Take 1 tablet (10 mg) by mouth daily 90 tablet 0     MAGNESIUM OR 1 tablet  daily       metoprolol succinate ER (TOPROL-XL) 100 MG 24 hr tablet Take 1 tablet (100 mg) by mouth daily 90 tablet 3     omeprazole (PRILOSEC) 20 MG DR capsule TAKE 1 CAPSULE TWICE DAILY 180 capsule 3     warfarin ANTICOAGULANT (COUMADIN) 5 MG tablet TAKE 1 TABLET EVERY DAY 90 tablet 0       PAST SURGICAL HISTORY:  Past Surgical History:   Procedure Laterality Date     ABDOMEN SURGERY       ARTHROSCOPY KNEE RT/LT      right     ARTHROSCOPY KNEE RT/LT      left     C TOTAL KNEE ARTHROPLASTY  10/11    right knee     CATARACT IOL, RT/LT       COLONOSCOPY       PHACOEMULSIFICATION WITH STANDARD INTRAOCULAR LENS IMPLANT  2010; 2010    left eye; right eye     SUPRAPUBIC PROSTATECTOMY       VASCULAR SURGERY         ALLERGIES:   No Known Allergies    FAMILY HISTORY:  Family History   Problem Relation Age of Onset     Cancer Mother         leukemia     Depression Other      Depression Other      Prostate Cancer Brother      Prostate Cancer Brother      Prostate Cancer Brother          SOCIAL HISTORY:  Social History     Tobacco Use     Smoking status: Former Smoker     Packs/day: 1.50     Years: 34.00     Pack years: 51.00     Types: Cigarettes     Quit date: 1984     Years since quittin.2     Smokeless tobacco: Never Used     Tobacco comment: Nonsmoking household   Substance Use Topics     Alcohol use: Yes     Comment: very lightly     Drug use: No       ROS:   Constitutional: No fever, chills, or sweats. Weight stable.   ENT: No visual disturbance.  Sinus congestion+  Cardiovascular: As per HPI.   Respiratory: No cough, hemoptysis.    : No hematuria.   Integument: Negative.   Hematologic:  No easy bruising, no easy bleeding.  Neuro: Negative.   Musculoskeletal: Back pain+    Exam:  /86 (BP Location: Left arm, Patient Position: Sitting, Cuff Size: Adult Large)   Pulse 96   Wt 97.5 kg (215 lb)   SpO2 99%   BMI 34.70 kg/m    GENERAL APPEARANCE: alert and no distress  HEENT: no icterus, no  central cyanosis  LYMPH/NECK: no asymmetry, JVP not elevated, no carotid bruits.  RESPIRATORY: lungs clear to auscultation - no rales, rhonchi or wheezes, no use of accessory muscles, no retractions, respirations are unlabored, normal respiratory rate  CARDIOVASCULAR: irregular rhythm, normal S1, S2, no S3 or S4 and no murmur, click or rub, precordium quiet with normal PMI.  GI: Obese+  EXTREMITIES: Wearing compression socks.  Cannot examine lower extremity edema.  NEURO: alert, normal speech,and affect  SKIN: no ecchymoses, no rashes     I have reviewed the labs and personally reviewed the imaging below and made my comment in the assessment and plan.      Labs:  CBC RESULTS:   Lab Results   Component Value Date    WBC 6.9 11/10/2014    RBC 4.59 11/10/2014    HGB 13.7 11/10/2014    HCT 42.3 11/10/2014    MCV 92 11/10/2014    MCH 29.8 11/10/2014    MCHC 32.4 11/10/2014    RDW 13.5 11/10/2014     11/10/2014       BMP RESULTS:  Lab Results   Component Value Date     03/22/2021    POTASSIUM 4.6 03/22/2021    CHLORIDE 108 03/22/2021    CO2 30 03/22/2021    ANIONGAP 2 (L) 03/22/2021     (H) 03/22/2021    BUN 21 03/22/2021    CR 1.06 03/22/2021    GFRESTIMATED 67 03/22/2021    GFRESTBLACK 78 03/22/2021    JENNY 9.0 03/22/2021      Echocardiogram 10/17/2019  Left ventricular function is normal. EF > 65%.No regional wall motion  abnormalities are seen.  Global right ventricular function is normal.  No significant valvular abnormalities.  The inferior vena cava was normal in size with preserved respiratory  variability.  No pericardial effusion is present.     There is no prior study for direct comparison.      Assessment and Plan:   Mr. Rian Ness is a 74 year old  male with PMH significant for prostate carcinoma , hypertension and persistent atrial fibrillation.    Patient is being seen today for annual follow-up.  He reports doing well.      1.  Persistent atrial fibrillation: Patient currently  asymptomatic.  Recommended to continue rate control with metoprolol 100 mg and warfarin. Patient's chads VASC score is 3  (age 2, hypertension 1) therefore he will need lifelong anticoagulation.  No underlying structural heart disease by echo.      2.  Hypertension: Well controlled on metoprolol.      3.  Bilateral varicose veins: Underwent bilateral surgery.      No medication changes today.      Return to clinic as needed.    Total spent time 20 minutes including face-to-face clinic visit and medical documentation.    Please donot hesitate to contact me if you have any questions or concerns. Again, thank you for allowing me to participate in the care of your patient.    Yash LEOS MD  [facility] Division of Cardiology  Pager [phone]

## 2021-05-04 NOTE — PROGRESS NOTES
Referring provider:Junie Golden MD    HPI: Mr. Rian Ness is a 74 year old  male with PMH significant for prostate carcinoma and hypertension.    The patient was recently found to be in atrial fibrillation when he presented for regular follow-up with Dr. Golden on 10/11/2019.  The patient was feeling mild palpitations on that day.  He reports on and off similar palpitations for over a year but this was never caught on EKG.  The patient denies associated chest pain, dyspnea on exertion, dizziness or syncope.  He is not exactly sure how long the episodes last. He says sometimes they occur two day in a row, sometimes once every few weeks. The patient reports he is able to do his ADLs even though he feels palpitations.  He states` they do not bother me too much`. TSH was checked which was normal.  Echo obtained on 10/17/2018 showed a structurally normal heart with normal EF with no valvular disease. He is being seen today for new onset atrial fibrillation.    The patient has a history of smoking for 51 pack years quit date 1984.  No prior history of  diabetes, hyperlipidemia, atrial fibrillation or any other cardiac disease.  The patient has been on metoprolol 50 mg for hypertension control.  Patient was started on rivaroxaban 10/11.  The patient recently underwent varicose vein surgery on the left leg.  He is expecting to undergo similar operation for the right leg as well.    The patient overall feels well and denies exertional chest pain.  He reports dyspnea on exertion which is not new.  He is a retired  he still tries to stay active.    I reviewed patient's EKG dated 10/11/2019 which shows atrial fibrillation versus flutter heart rate 89 bpm.  I reviewed today's EKG in clinic today which shows sinus rhythm with one PAC, normal DE/QRS and QTc intervals.  No ST-T wave changes.  Normal axis.    Medications, personal, family, and social history reviewed with patient and  revised.    Interval history 5/5/2020:  I called and talked to the patient on the phone today for 6-month follow-up.  The patient reports overall feeling well.  In the interim he switched from Xarelto to warfarin.  INR is within the therapeutic range.  He is on metoprolol 100 mg which I have increased 6 months ago.  The patient tolerated the medication well and he no longer feels palpitations.  Denies chest pain,  shortness of breath, fever or cough.  He reports mild lightheadedness and dizziness and spring allergies.  He is a former smoker.  Patient monitors blood pressure at home and 3 days ago his blood pressure was 136/70 mmHg at home.  His current medications are metoprolol 100 mg, omeprazole and warfarin.  Labs from 3/30/2020 shows normal BMP.     Interval history 5/4/2021:  Patient is being seen today for annual follow-up.  He reports doing well over the last 1 year.  Denies palpitations, chest pain, shortness of breath.  Patient tells me that palpitations resolved once he started using metoprolol 100 mg.  Patient had varicose vein surgery on both legs now.  He is wearing compression socks.  He tells me that surgery helped a little bit.  He works in his garage on trucks.  Does not smoke.  No alcohol abuse.    PAST MEDICAL HISTORY:  Past Medical History:   Diagnosis Date     Arthritis      Cancer (H)      Depressive disorder      HTN        CURRENT MEDICATIONS:  Current Outpatient Medications   Medication Sig Dispense Refill     DAILY MULTIVITAMIN PO 1 tab daily.        loratadine (CLARITIN) 10 MG tablet Take 1 tablet (10 mg) by mouth daily 90 tablet 0     MAGNESIUM OR 1 tablet daily       metoprolol succinate ER (TOPROL-XL) 100 MG 24 hr tablet Take 1 tablet (100 mg) by mouth daily 90 tablet 3     omeprazole (PRILOSEC) 20 MG DR capsule TAKE 1 CAPSULE TWICE DAILY 180 capsule 3     warfarin ANTICOAGULANT (COUMADIN) 5 MG tablet TAKE 1 TABLET EVERY DAY 90 tablet 0       PAST SURGICAL HISTORY:  Past Surgical  History:   Procedure Laterality Date     ABDOMEN SURGERY       ARTHROSCOPY KNEE RT/LT      right     ARTHROSCOPY KNEE RT/LT      left     C TOTAL KNEE ARTHROPLASTY  10/11    right knee     CATARACT IOL, RT/LT       COLONOSCOPY       PHACOEMULSIFICATION WITH STANDARD INTRAOCULAR LENS IMPLANT  2010; 2010    left eye; right eye     SUPRAPUBIC PROSTATECTOMY       VASCULAR SURGERY         ALLERGIES:   No Known Allergies    FAMILY HISTORY:  Family History   Problem Relation Age of Onset     Cancer Mother         leukemia     Depression Other      Depression Other      Prostate Cancer Brother      Prostate Cancer Brother      Prostate Cancer Brother          SOCIAL HISTORY:  Social History     Tobacco Use     Smoking status: Former Smoker     Packs/day: 1.50     Years: 34.00     Pack years: 51.00     Types: Cigarettes     Quit date: 1984     Years since quittin.2     Smokeless tobacco: Never Used     Tobacco comment: Nonsmoking household   Substance Use Topics     Alcohol use: Yes     Comment: very lightly     Drug use: No       ROS:   Constitutional: No fever, chills, or sweats. Weight stable.   ENT: No visual disturbance.  Sinus congestion+  Cardiovascular: As per HPI.   Respiratory: No cough, hemoptysis.    : No hematuria.   Integument: Negative.   Hematologic:  No easy bruising, no easy bleeding.  Neuro: Negative.   Musculoskeletal: Back pain+    Exam:  /86 (BP Location: Left arm, Patient Position: Sitting, Cuff Size: Adult Large)   Pulse 96   Wt 97.5 kg (215 lb)   SpO2 99%   BMI 34.70 kg/m    GENERAL APPEARANCE: alert and no distress  HEENT: no icterus, no central cyanosis  LYMPH/NECK: no asymmetry, JVP not elevated, no carotid bruits.  RESPIRATORY: lungs clear to auscultation - no rales, rhonchi or wheezes, no use of accessory muscles, no retractions, respirations are unlabored, normal respiratory rate  CARDIOVASCULAR: irregular rhythm, normal S1, S2, no S3 or S4 and no murmur,  click or rub, precordium quiet with normal PMI.  GI: Obese+  EXTREMITIES: Wearing compression socks.  Cannot examine lower extremity edema.  NEURO: alert, normal speech,and affect  SKIN: no ecchymoses, no rashes     I have reviewed the labs and personally reviewed the imaging below and made my comment in the assessment and plan.      Labs:  CBC RESULTS:   Lab Results   Component Value Date    WBC 6.9 11/10/2014    RBC 4.59 11/10/2014    HGB 13.7 11/10/2014    HCT 42.3 11/10/2014    MCV 92 11/10/2014    MCH 29.8 11/10/2014    MCHC 32.4 11/10/2014    RDW 13.5 11/10/2014     11/10/2014       BMP RESULTS:  Lab Results   Component Value Date     03/22/2021    POTASSIUM 4.6 03/22/2021    CHLORIDE 108 03/22/2021    CO2 30 03/22/2021    ANIONGAP 2 (L) 03/22/2021     (H) 03/22/2021    BUN 21 03/22/2021    CR 1.06 03/22/2021    GFRESTIMATED 67 03/22/2021    GFRESTBLACK 78 03/22/2021    JENNY 9.0 03/22/2021      Echocardiogram 10/17/2019  Left ventricular function is normal. EF > 65%.No regional wall motion  abnormalities are seen.  Global right ventricular function is normal.  No significant valvular abnormalities.  The inferior vena cava was normal in size with preserved respiratory  variability.  No pericardial effusion is present.     There is no prior study for direct comparison.      Assessment and Plan:   Mr. Rian Ness is a 74 year old  male with PMH significant for prostate carcinoma , hypertension and persistent atrial fibrillation.    Patient is being seen today for annual follow-up.  He reports doing well.      1.  Persistent atrial fibrillation: Patient currently asymptomatic.  Recommended to continue rate control with metoprolol 100 mg and warfarin. Patient's chads VASC score is 3  (age 2, hypertension 1) therefore he will need lifelong anticoagulation.  No underlying structural heart disease by echo.      2.  Hypertension: Well controlled on metoprolol.      3.  Bilateral varicose veins:  Underwent bilateral surgery.      No medication changes today.      Return to clinic as needed.    Total spent time 20 minutes including face-to-face clinic visit and medical documentation.    Please donot hesitate to contact me if you have any questions or concerns. Again, thank you for allowing me to participate in the care of your patient.    Yash LEOS MD  [facility] Division of Cardiology  Pager [phone]

## 2021-06-01 ENCOUNTER — ANTICOAGULATION THERAPY VISIT (OUTPATIENT)
Dept: ANTICOAGULATION | Facility: CLINIC | Age: 76
End: 2021-06-01

## 2021-06-01 DIAGNOSIS — I48.0 PAF (PAROXYSMAL ATRIAL FIBRILLATION) (H): ICD-10-CM

## 2021-06-01 DIAGNOSIS — I48.0 PAROXYSMAL ATRIAL FIBRILLATION (H): ICD-10-CM

## 2021-06-01 LAB
CAPILLARY BLOOD COLLECTION: NORMAL
INR PPP: 2.2 (ref 0.86–1.14)

## 2021-06-01 PROCEDURE — 36416 COLLJ CAPILLARY BLOOD SPEC: CPT | Performed by: FAMILY MEDICINE

## 2021-06-01 PROCEDURE — 85610 PROTHROMBIN TIME: CPT | Performed by: FAMILY MEDICINE

## 2021-06-01 NOTE — PROGRESS NOTES
ANTICOAGULATION MANAGEMENT     Patient Name:  Rian Ness  Date:  2021    ASSESSMENT /SUBJECTIVE:    Today's INR result of 2.20 is therapeutic. Goal INR of 2.0-3.0      Warfarin dose taken: Warfarin taken as instructed    Diet: No new diet changes affecting INR    Medication changes/ interactions: No new medications/supplements affecting INR    Previous INR: Therapeutic     S/S of bleeding or thromboembolism: No    New injury or illness: No    Upcoming surgery, procedure or cardioversion: No    Additional findings: None      PLAN:    Warfarin Dosing Instructions: Continue your current warfarin dose 5 mg daily.    Instructed patient to follow up no later than: 5 weeks  Lab visit scheduled    Education provided: Contact [facility] Anticoagulation: [phone]  with any changes, questions or concerns.     Telephone call with Rian whom verbalizes understanding and agrees to plan    Instructed to call the Anticoagulation Clinic for any changes, questions or concerns. (#[phone])        Derrick Byrne RN      OBJECTIVE:  Recent labs: (last 7 days)     21  1000   INR 2.20*         No question data found.  Anticoagulation Summary  As of 2021    INR goal:  2.0-3.0   TTR:  90.0 % (1 y)   INR used for dosin.20 (2021)   Warfarin maintenance plan:  5 mg (5 mg x 1) every day   Full warfarin instructions:  5 mg every day   Weekly warfarin total:  35 mg   No change documented:  Derrick Byrne, RN   Plan last modified:  Charley Francisco RN (2/3/2020)   Next INR check:  2021   Priority:  Maintenance   Target end date:  Indefinite    Indications    Paroxysmal atrial fibrillation (H) [I48.0]             Anticoagulation Episode Summary     INR check location:      Preferred lab:      Send INR reminders to:  LATIA MEJIAS    Comments:  referral renewed 20      Anticoagulation Care Providers     Provider Role Specialty Phone number    Junie Golden MD  [facility] [phone]

## 2021-06-13 ENCOUNTER — MYC REFILL (OUTPATIENT)
Dept: FAMILY MEDICINE | Facility: CLINIC | Age: 76
End: 2021-06-13

## 2021-06-13 DIAGNOSIS — I48.0 PAF (PAROXYSMAL ATRIAL FIBRILLATION) (H): ICD-10-CM

## 2021-06-14 RX ORDER — WARFARIN SODIUM 5 MG/1
TABLET ORAL
Qty: 90 TABLET | Refills: 1 | Status: SHIPPED | OUTPATIENT
Start: 2021-06-14 | End: 2021-11-04

## 2021-06-16 DIAGNOSIS — J30.2 SEASONAL ALLERGIC RHINITIS, UNSPECIFIED TRIGGER: ICD-10-CM

## 2021-06-17 RX ORDER — LORATADINE 10 MG/1
TABLET ORAL
Qty: 90 TABLET | Refills: 0 | Status: SHIPPED | OUTPATIENT
Start: 2021-06-17 | End: 2021-09-16

## 2021-06-17 NOTE — TELEPHONE ENCOUNTER
"Routing refill request to provider for review/approval because:  Failed protocol.  No future appointment scheduled. Please advise. Thank you. Laura Benoit R.N.  Requested Prescriptions   Pending Prescriptions Disp Refills    loratadine (CLARITIN) 10 MG tablet [Pharmacy Med Name: LORATADINE 10MG TABLETS] 90 tablet 0     Sig: TAKE 1 TABLET(10 MG) BY MOUTH DAILY       Antihistamines Protocol Failed - 6/16/2021  3:13 PM        Failed - Patient is 3-64 years of age     Apply weight-based dosing for peds patients age 3 - 12 years of age.    Forward request to provider for patients under the age of 3 or over the age of 64.          Passed - Recent (12 mo) or future (30 days) visit within the authorizing provider's specialty     Patient has had an office visit with the authorizing provider or a provider within the authorizing providers department within the previous 12 mos or has a future within next 30 days. See \"Patient Info\" tab in inbasket, or \"Choose Columns\" in Meds & Orders section of the refill encounter.              Passed - Medication is active on med list                   "

## 2021-07-05 DIAGNOSIS — I48.0 PAF (PAROXYSMAL ATRIAL FIBRILLATION) (H): ICD-10-CM

## 2021-07-06 ENCOUNTER — ANTICOAGULATION THERAPY VISIT (OUTPATIENT)
Dept: NURSING | Facility: CLINIC | Age: 76
End: 2021-07-06

## 2021-07-06 DIAGNOSIS — I48.0 PAF (PAROXYSMAL ATRIAL FIBRILLATION) (H): ICD-10-CM

## 2021-07-06 DIAGNOSIS — I48.0 PAROXYSMAL ATRIAL FIBRILLATION (H): ICD-10-CM

## 2021-07-06 LAB — INR PPP: 2.4 (ref 0.86–1.14)

## 2021-07-06 PROCEDURE — 36416 COLLJ CAPILLARY BLOOD SPEC: CPT | Performed by: FAMILY MEDICINE

## 2021-07-06 PROCEDURE — 85610 PROTHROMBIN TIME: CPT | Performed by: FAMILY MEDICINE

## 2021-07-06 RX ORDER — METOPROLOL SUCCINATE 100 MG/1
100 TABLET, EXTENDED RELEASE ORAL DAILY
Qty: 90 TABLET | Refills: 3 | Status: SHIPPED | OUTPATIENT
Start: 2021-07-06 | End: 2022-04-22

## 2021-07-06 NOTE — PROGRESS NOTES
ANTICOAGULATION MANAGEMENT     Rian Ness 75 year old male is on warfarin with therapeutic INR result. (Goal INR 2.0-3.0)    Recent labs: (last 7 days)     07/06/21  0950   INR 2.40*       ASSESSMENT     Source(s): Patient/Caregiver Call       Warfarin doses taken: Warfarin taken as instructed    Diet: No new diet changes identified    New illness, injury, or hospitalization: No    Medication/supplement changes: None noted    Signs or symptoms of bleeding or clotting: No    Previous INR: Therapeutic last 2(+) visits    Additional findings: None     PLAN     Recommended plan for no diet, medication or health factor changes affecting INR     Dosing Instructions: Continue your current warfarin dose with next INR in 6 weeks       Summary  As of 7/6/2021    Full warfarin instructions:  5 mg every day   Next INR check:               Telephone call with Rian who verbalizes understanding and agrees to plan and who agrees to plan and repeated back plan correctly    Lab visit scheduled    Education provided: Please call back if any changes to your diet, medications or how you've been taking warfarin and Contact [phone]  with any changes, questions or concerns.     Plan made per [facility] anticoagulation protocol    Charley Francisco RN  Anticoagulation Clinic  7/6/2021    _______________________________________________________________________     Anticoagulation Episode Summary     Current INR goal:  2.0-3.0   TTR:  98.2 % (1 y)   Target end date:  Indefinite   Send INR reminders to:  [facility]    Indications    Paroxysmal atrial fibrillation (H) [I48.0]           Comments:  referral renewed 11/16/20         Anticoagulation Care Providers     Provider Role Specialty Phone number    Junie Golden MD Referring Family Medicine [phone]        Anticoagulation Management    Unable to reach Rian today.    Today's INR result of 2.40 is therapeutic (goal INR of 2.0-3.0).  Result received from: Clinic  Lab    Follow up required to confirm warfarin dose taken and assess for changes    left message to call back.      Anticoagulation clinic to follow up    Charley Francisco RN

## 2021-08-17 ENCOUNTER — ANTICOAGULATION THERAPY VISIT (OUTPATIENT)
Dept: ANTICOAGULATION | Facility: CLINIC | Age: 76
End: 2021-08-17

## 2021-08-17 ENCOUNTER — LAB (OUTPATIENT)
Dept: LAB | Facility: CLINIC | Age: 76
End: 2021-08-17
Payer: MEDICARE

## 2021-08-17 DIAGNOSIS — I48.0 PAROXYSMAL ATRIAL FIBRILLATION (H): ICD-10-CM

## 2021-08-17 DIAGNOSIS — I48.0 PAROXYSMAL ATRIAL FIBRILLATION (H): Primary | ICD-10-CM

## 2021-08-17 LAB — INR BLD: 2.7 (ref 0.9–1.1)

## 2021-08-17 PROCEDURE — 85610 PROTHROMBIN TIME: CPT

## 2021-08-17 PROCEDURE — 36416 COLLJ CAPILLARY BLOOD SPEC: CPT

## 2021-08-17 NOTE — PROGRESS NOTES
ANTICOAGULATION MANAGEMENT     Rian Ness 75 year old male is on warfarin with therapeutic INR result. (Goal INR 2.0-3.0)    Recent labs: (last 7 days)     08/17/21  0925   INR 2.7*       ASSESSMENT     Source(s): Chart Review and Patient/Caregiver Call       Warfarin doses taken: Warfarin taken as instructed    Diet: No new diet changes identified    New illness, injury, or hospitalization: No    Medication/supplement changes: None noted    Signs or symptoms of bleeding or clotting: No    Previous INR: Therapeutic last 2(+) visits    Additional findings: None     PLAN     Recommended plan for no diet, medication or health factor changes affecting INR     Dosing Instructions: Continue your current warfarin dose with next INR in 6 weeks       Summary  As of 8/17/2021    Full warfarin instructions:  5 mg every day   Next INR check:  9/28/2021             Telephone call with Rian who verbalizes understanding and agrees to plan    Lab visit scheduled    Education provided: Please call back if any changes to your diet, medications or how you've been taking warfarin    Plan made per [facility] anticoagulation protocol    Charley Francisco RN  Anticoagulation Clinic  8/17/2021    _______________________________________________________________________     Anticoagulation Episode Summary     Current INR goal:  2.0-3.0   TTR:  100.0 % (1 y)   Target end date:  Indefinite   Send INR reminders to:  [facility]    Indications    Paroxysmal atrial fibrillation (H) [I48.0]           Comments:  referral renewed 11/16/20         Anticoagulation Care Providers     Provider Role Specialty Phone number    Junie Golden MD Referring Family Medicine [phone]

## 2021-09-15 DIAGNOSIS — J30.2 SEASONAL ALLERGIC RHINITIS, UNSPECIFIED TRIGGER: ICD-10-CM

## 2021-09-15 NOTE — TELEPHONE ENCOUNTER
"Requested Prescriptions   Pending Prescriptions Disp Refills    loratadine (CLARITIN) 10 MG tablet [Pharmacy Med Name: LORATADINE 10MG TABLETS] 90 tablet 0     Sig: TAKE 1 TABLET(10 MG) BY MOUTH DAILY        Antihistamines Protocol Failed - 9/15/2021  1:37 PM        Failed - Patient is 3-64 years of age     Apply weight-based dosing for peds patients age 3 - 12 years of age.    Forward request to provider for patients under the age of 3 or over the age of 64.          Passed - Recent (12 mo) or future (30 days) visit within the authorizing provider's specialty     Patient has had an office visit with the authorizing provider or a provider within the authorizing providers department within the previous 12 mos or has a future within next 30 days. See \"Patient Info\" tab in inbasket, or \"Choose Columns\" in Meds & Orders section of the refill encounter.              Passed - Medication is active on med list              "

## 2021-09-16 RX ORDER — LORATADINE 10 MG/1
TABLET ORAL
Qty: 90 TABLET | Refills: 1 | Status: SHIPPED | OUTPATIENT
Start: 2021-09-16 | End: 2022-04-06

## 2021-09-26 ENCOUNTER — HEALTH MAINTENANCE LETTER (OUTPATIENT)
Age: 76
End: 2021-09-26

## 2021-09-28 ENCOUNTER — LAB (OUTPATIENT)
Dept: LAB | Facility: CLINIC | Age: 76
End: 2021-09-28
Payer: MEDICARE

## 2021-09-28 ENCOUNTER — ANTICOAGULATION THERAPY VISIT (OUTPATIENT)
Dept: ANTICOAGULATION | Facility: CLINIC | Age: 76
End: 2021-09-28

## 2021-09-28 DIAGNOSIS — I48.0 PAROXYSMAL ATRIAL FIBRILLATION (H): ICD-10-CM

## 2021-09-28 DIAGNOSIS — I48.0 PAROXYSMAL ATRIAL FIBRILLATION (H): Primary | ICD-10-CM

## 2021-09-28 LAB — INR BLD: 2.3 (ref 0.9–1.1)

## 2021-09-28 PROCEDURE — 85610 PROTHROMBIN TIME: CPT

## 2021-09-28 PROCEDURE — 36416 COLLJ CAPILLARY BLOOD SPEC: CPT

## 2021-09-28 NOTE — PROGRESS NOTES
ANTICOAGULATION MANAGEMENT     Rian Ness 75 year old male is on warfarin with therapeutic INR result. (Goal INR 2.0-3.0)    Recent labs: (last 7 days)     09/28/21  0927   INR 2.3*       ASSESSMENT     Source(s): Chart Review and Patient/Caregiver Call       Warfarin doses taken: Warfarin taken as instructed    Diet: No new diet changes identified    New illness, injury, or hospitalization: No    Medication/supplement changes: None noted    Signs or symptoms of bleeding or clotting: No    Previous INR: Therapeutic last 2(+) visits    Additional findings: None     PLAN     Recommended plan for no diet, medication or health factor changes affecting INR     Dosing Instructions: Continue your current warfarin dose with next INR in 6 weeks       Summary  As of 9/28/2021    Full warfarin instructions:  5 mg every day   Next INR check:  11/9/2021             Telephone call with Rian who verbalizes understanding and agrees to plan    Lab visit scheduled    Education provided: Please call back if any changes to your diet, medications or how you've been taking warfarin and Contact [phone]  with any changes, questions or concerns.     Plan made per [facility] anticoagulation protocol    Charley Francisco, RN  Anticoagulation Clinic  9/28/2021    _______________________________________________________________________     Anticoagulation Episode Summary     Current INR goal:  2.0-3.0   TTR:  100.0 % (1 y)   Target end date:  Indefinite   Send INR reminders to:  [facility]    Indications    Paroxysmal atrial fibrillation (H) [I48.0]           Comments:  referral renewed 11/16/20         Anticoagulation Care Providers     Provider Role Specialty Phone number    Junie Golden MD Referring Family Medicine [phone]

## 2021-11-01 NOTE — PROGRESS NOTES
"  Assessment & Plan     (R22.42) Mass of left foot  (primary encounter diagnosis)  Comment: likely varicose vein with associated edema  Plan: XR Foot Left G/E 3 Views        reassurance    (I83.813) Varicose veins of both lower extremities with pain  Comment: has had some vein treatments in past  Plan: Vascular Surgery Referral        Would like new referral to see if more can be done.    (Z23) High priority for 2019-nCoV vaccine  Comment: due  Plan: COVID-19,PF,PFIZER (12+ Yrs PURPLE LABEL)                        BMI:   Estimated body mass index is 33.64 kg/m  as calculated from the following:    Height as of 4/8/21: 1.676 m (5' 6\").    Weight as of this encounter: 94.5 kg (208 lb 6.4 oz).       See Patient Instructions    Return in about 5 months (around 4/4/2022) for Wellness Exam, Fasting Lab Work.    Junie Golden MD  [facility]   Rian is a 76 year old who presents for the following health issues     HPI     Concern - Lump   Onset: 1 month   Description: Left foot lump  Intensity: 3/10 Currently  Progression of Symptoms:  same and intermittent  Accompanying Signs & Symptoms: swelling,  Congestion , cough in proving   Previous history of similar problem: none   Precipitating factors:        Worsened by: touch, walking   Alleviating factors:        Improved by: rest, elevation   Therapies tried and outcome: rest, elevation     Lump on top of left foot, soft mild tenderness,  No trauma or injury, no change since noting it        Review of Systems   Constitutional, HEENT, cardiovascular, pulmonary, gi and gu systems are negative, except as otherwise noted.      Objective    /83   Pulse 105   Temp 98.1  F (36.7  C) (Oral)   Resp 20   Wt 94.5 kg (208 lb 6.4 oz)   SpO2 95%   BMI 33.64 kg/m    Body mass index is 33.64 kg/m .  Physical Exam   GENERAL: alert, no distress and elderly  EYES: Eyes grossly normal to inspection, PERRL and conjunctivae and sclerae " normal  MS: bilateral lower extremities with significant varicose veins extending down to ankle and foot. Area of concern is dorsum of left foot at proximal portion of 2nd metatarsal shaft. Soft/spongy, with varicose vein running over it..  Minimal ttp. + bilateral 1+ edema due to venous insufficiency  SKIN: varicosities - as described above  PSYCH: mentation appears normal, affect normal/bright    Xray left foot - calcaneal spur, no other bony abnl, mild soft tissue edema, personally reviewed during visit, await radiology review

## 2021-11-03 DIAGNOSIS — I48.0 PAF (PAROXYSMAL ATRIAL FIBRILLATION) (H): ICD-10-CM

## 2021-11-04 ENCOUNTER — OFFICE VISIT (OUTPATIENT)
Dept: FAMILY MEDICINE | Facility: CLINIC | Age: 76
End: 2021-11-04
Payer: MEDICARE

## 2021-11-04 ENCOUNTER — ANCILLARY PROCEDURE (OUTPATIENT)
Dept: GENERAL RADIOLOGY | Facility: CLINIC | Age: 76
End: 2021-11-04
Attending: FAMILY MEDICINE
Payer: MEDICARE

## 2021-11-04 VITALS
HEART RATE: 105 BPM | BODY MASS INDEX: 33.64 KG/M2 | WEIGHT: 208.4 LBS | SYSTOLIC BLOOD PRESSURE: 139 MMHG | TEMPERATURE: 98.1 F | DIASTOLIC BLOOD PRESSURE: 83 MMHG | RESPIRATION RATE: 20 BRPM | OXYGEN SATURATION: 95 %

## 2021-11-04 DIAGNOSIS — R22.42 MASS OF LEFT FOOT: Primary | ICD-10-CM

## 2021-11-04 DIAGNOSIS — I83.813 VARICOSE VEINS OF BOTH LOWER EXTREMITIES WITH PAIN: ICD-10-CM

## 2021-11-04 DIAGNOSIS — Z23 HIGH PRIORITY FOR 2019-NCOV VACCINE: ICD-10-CM

## 2021-11-04 DIAGNOSIS — R22.42 MASS OF LEFT FOOT: ICD-10-CM

## 2021-11-04 PROCEDURE — G0008 ADMIN INFLUENZA VIRUS VAC: HCPCS | Performed by: FAMILY MEDICINE

## 2021-11-04 PROCEDURE — 91300 COVID-19,PF,PFIZER (12+ YRS): CPT | Performed by: FAMILY MEDICINE

## 2021-11-04 PROCEDURE — 99214 OFFICE O/P EST MOD 30 MIN: CPT | Mod: 25 | Performed by: FAMILY MEDICINE

## 2021-11-04 PROCEDURE — 0004A COVID-19,PF,PFIZER (12+ YRS): CPT | Performed by: FAMILY MEDICINE

## 2021-11-04 PROCEDURE — 90662 IIV NO PRSV INCREASED AG IM: CPT | Performed by: FAMILY MEDICINE

## 2021-11-04 PROCEDURE — 73630 X-RAY EXAM OF FOOT: CPT | Mod: LT | Performed by: RADIOLOGY

## 2021-11-04 RX ORDER — WARFARIN SODIUM 5 MG/1
TABLET ORAL
Qty: 90 TABLET | Refills: 1 | Status: SHIPPED | OUTPATIENT
Start: 2021-11-04 | End: 2022-04-21

## 2021-11-04 ASSESSMENT — PAIN SCALES - GENERAL: PAINLEVEL: MILD PAIN (3)

## 2021-11-09 ENCOUNTER — LAB (OUTPATIENT)
Dept: LAB | Facility: CLINIC | Age: 76
End: 2021-11-09
Payer: MEDICARE

## 2021-11-09 ENCOUNTER — ANTICOAGULATION THERAPY VISIT (OUTPATIENT)
Dept: ANTICOAGULATION | Facility: CLINIC | Age: 76
End: 2021-11-09

## 2021-11-09 ENCOUNTER — DOCUMENTATION ONLY (OUTPATIENT)
Dept: FAMILY MEDICINE | Facility: CLINIC | Age: 76
End: 2021-11-09

## 2021-11-09 DIAGNOSIS — I48.0 PAROXYSMAL ATRIAL FIBRILLATION (H): ICD-10-CM

## 2021-11-09 DIAGNOSIS — I48.0 PAROXYSMAL ATRIAL FIBRILLATION (H): Primary | ICD-10-CM

## 2021-11-09 LAB — INR BLD: 2.3 (ref 0.9–1.1)

## 2021-11-09 PROCEDURE — 36415 COLL VENOUS BLD VENIPUNCTURE: CPT

## 2021-11-09 PROCEDURE — 85610 PROTHROMBIN TIME: CPT

## 2021-11-09 NOTE — PROGRESS NOTES
ANTICOAGULATION MANAGEMENT     Rian Ness 76 year old male is on warfarin with therapeutic INR result. (Goal INR 2.0-3.0)    Recent labs: (last 7 days)     11/09/21  0945   INR 2.3*       ASSESSMENT     Source(s): Chart Review and Patient/Caregiver Call       Warfarin doses taken: Warfarin taken as instructed    Diet: No new diet changes identified    New illness, injury, or hospitalization: No    Medication/supplement changes: None noted    Signs or symptoms of bleeding or clotting: No    Previous INR: Therapeutic last 2(+) visits    Additional findings: None     PLAN     Recommended plan for no diet, medication or health factor changes affecting INR     Dosing Instructions: Continue your current warfarin dose with next INR in 6 weeks       Summary  As of 11/9/2021    Full warfarin instructions:  5 mg every day   Next INR check:  12/21/2021             Telephone call with Rian who verbalizes understanding and agrees to plan    Lab visit scheduled    Education provided: Please call back if any changes to your diet, medications or how you've been taking warfarin and Contact [phone]  with any changes, questions or concerns.     Plan made per [facility] anticoagulation protocol    Charley Francisco RN  Anticoagulation Clinic  11/9/2021    _______________________________________________________________________     Anticoagulation Episode Summary     Current INR goal:  2.0-3.0   TTR:  100.0 % (1 y)   Target end date:  Indefinite   Send INR reminders to:  [facility]    Indications    Paroxysmal atrial fibrillation (H) [I48.0]           Comments:  referral renewed 11/16/20         Anticoagulation Care Providers     Provider Role Specialty Phone number    Junie Golden MD Referring Family Medicine [phone]

## 2021-11-09 NOTE — PROGRESS NOTES
ANTICOAGULATION MANAGEMENT      Rian Ness due for annual renewal of referral to anticoagulation monitoring. Order pended for your review and signature.      ANTICOAGULATION SUMMARY      Warfarin indication(s)     Atrial fibrillation    Heart valve present?  NO       Current goal range   INR: 2.0-3.0     Goal appropriate for indication? Yes, INR 2-3 appropriate for hx of DVT, PE, hypercoagulable state, Afib, LVAD, or bileaflet AVR without risk factors     Current duration of therapy Indefinite/long term therapy   Time in Therapeutic Range (TTR)  (Goal > 60%) 100%       Office visit with referring provider's group within last year yes on 11/4/21       Charley Francisco RN

## 2021-11-15 ENCOUNTER — TELEPHONE (OUTPATIENT)
Dept: OTHER | Facility: CLINIC | Age: 76
End: 2021-11-15
Payer: MEDICARE

## 2021-11-15 NOTE — TELEPHONE ENCOUNTER
[facility]    Who is the name of the provider?:  New      What is the location you see this provider at?: [place]    Reason for call:  Referred by PCP to vascular surgery.     Can we leave a detailed message on this number?  YES

## 2021-11-15 NOTE — TELEPHONE ENCOUNTER
Per chart review referral is for varicose veins and referral was placed to [facility]. However patient lives in [place] and referral is for [place] location.   Patient needs to be scheduled for in-person consult with Dr. Hendrickson in [place] otherwise referral needs to go to [facility].     Stacy JEFFRIESN, RN    [facility]  [facility]  Office: [phone]  Fax: [phone]

## 2021-12-14 ENCOUNTER — OFFICE VISIT (OUTPATIENT)
Dept: VASCULAR SURGERY | Facility: CLINIC | Age: 76
End: 2021-12-14
Payer: MEDICARE

## 2021-12-14 DIAGNOSIS — I83.812 VARICOSE VEINS OF LEFT LOWER EXTREMITY WITH PAIN: Primary | ICD-10-CM

## 2021-12-14 DIAGNOSIS — I83.893 VARICOSE VEINS OF LEG WITH EDEMA, BILATERAL: ICD-10-CM

## 2021-12-14 DIAGNOSIS — I87.2 STASIS DERMATITIS OF BOTH LEGS: ICD-10-CM

## 2021-12-14 PROCEDURE — 99203 OFFICE O/P NEW LOW 30 MIN: CPT | Performed by: SPECIALIST

## 2021-12-14 NOTE — PROGRESS NOTES
Patient Reported symptoms:    Right leg   Heaviness A little of the time   Achiness Some of the time   Swelling A little of the time   Throbbing A little of the time   Itching A little of the time   Appearance Moderately noticeable   Impact on work/activities Mildly reduced     Left Leg   Heaviness A little of the time   Achiness Some of the time   Swelling Some of the time   Throbbing A little of the time   Itching A little of the time   Appearance Very noticeable   Impact on work/activities Mildly reduced

## 2021-12-14 NOTE — PROGRESS NOTES
Consult requested by Dr. Golden    Reason for consultation: Left leg varicose veins and painful vein on top of foot    HPI:  Patient is a 76-year-old white male with a longstanding history of bilateral lower extremity varicose veins.  He has been treated on and off over the past 30 to 40 years.  He has been wearing compression socks the whole time.  Recently he developed what he thinks is a painful vein on the top of his left foot.  He describes it as a burning sensation and occasionally start stabbing sensation.  It occurs despite wearing compression socks.  He denies any leg trauma, DVT, superficial phlebitis or nonhealing wounds.  He is on Coumadin for A. Fib.    Past Medical History:   Diagnosis Date     Arthritis      Cancer (H)      Depressive disorder      HTN      Past Surgical History:   Procedure Laterality Date     ABDOMEN SURGERY       ARTHROSCOPY KNEE RT/LT  2000    right     ARTHROSCOPY KNEE RT/LT  1998    left     C TOTAL KNEE ARTHROPLASTY  10/11    right knee     CATARACT IOL, RT/LT       COLONOSCOPY       PHACOEMULSIFICATION WITH STANDARD INTRAOCULAR LENS IMPLANT  1/2010; 2/2010    left eye; right eye     SUPRAPUBIC PROSTATECTOMY  2/11     VASCULAR SURGERY       Current Outpatient Medications   Medication     DAILY MULTIVITAMIN PO     loratadine (CLARITIN) 10 MG tablet     MAGNESIUM OR     metoprolol succinate ER (TOPROL-XL) 100 MG 24 hr tablet     omeprazole (PRILOSEC) 20 MG DR capsule     warfarin ANTICOAGULANT (COUMADIN) 5 MG tablet     No current facility-administered medications for this visit.      No Known Allergies  Social History     Socioeconomic History     Marital status:      Spouse name: Not on file     Number of children: Not on file     Years of education: Not on file     Highest education level: Not on file   Occupational History     Not on file   Tobacco Use     Smoking status: Former Smoker     Packs/day: 1.50     Years: 34.00     Pack years: 51.00     Types: Cigarettes      Quit date: 1984     Years since quittin.8     Smokeless tobacco: Never Used     Tobacco comment: Nonsmoking household   Vaping Use     Vaping Use: Never used   Substance and Sexual Activity     Alcohol use: Yes     Comment: very lightly     Drug use: No     Sexual activity: Not Currently     Partners: Female     Birth control/protection: Implant   Other Topics Concern     Parent/sibling w/ CABG, MI or angioplasty before 65F 55M? No   Social History Narrative     Not on file     Social Determinants of Health     Financial Resource Strain: Not on file   Food Insecurity: Not on file   Transportation Needs: Not on file   Physical Activity: Not on file   Stress: Not on file   Social Connections: Not on file   Intimate Partner Violence: Not on file   Housing Stability: Not on file     Family History   Problem Relation Age of Onset     Cancer Mother         leukemia     Depression Other      Depression Other      Prostate Cancer Brother      Prostate Cancer Brother      Prostate Cancer Brother       ROS: 10 point ROS neg other than the symptoms noted above in the HPI.    PE:  B/P: Data Unavailable, T: Data Unavailable, P: Data Unavailable, R: Data Unavailable  General: well developed, well nourished WMwho appears their stated age  HEENT: NC/AT, EOMI, (-)icterus, (-)injection  Neck: Supple, No JVD  Chest: CTA  Heart: Irreg  Abd: Soft, non tender, non distended, non tender, no masses  Ext; Warm, +1 edema bilateral with scattered varicosities.  Bilateral stasis changes.  Tender mass dorsum of left foot ?  Neuroma versus varicosity.  Psych: AAOx3  Neuro: No focal deficits    Impression/plan:  This is a 76-year-old gentleman with a painful lump on the dorsum of his left foot.  It is unclear whether it represents a varicosity versus neuroma.  He does have bilateral varicose veins and is a CEAP 4 bilaterally.  All his symptoms are controlled with compression other than this mass on the top of the foot.  I discussed  the possible etiologies of this mass as well as the role of ultrasound and the patient expressed understanding.  After discussion the patient at this time is obtain ultrasound of his left lower extremity for reflux and also to further evaluate the mass.  He will follow with me after the ultrasound.    Matthew Landry MD, FACS

## 2021-12-14 NOTE — LETTER
12/14/2021         RE: Rian Ness  [address] [facility] [address]        Dear Colleague,    Thank you for referring your patient, Rian eNss, to the [facility] [place]. Please see a copy of my visit note below.    Consult requested by Dr. Golden    Reason for consultation: Left leg varicose veins and painful vein on top of foot    HPI:  Patient is a 76-year-old white male with a longstanding history of bilateral lower extremity varicose veins.  He has been treated on and off over the past 30 to 40 years.  He has been wearing compression socks the whole time.  Recently he developed what he thinks is a painful vein on the top of his left foot.  He describes it as a burning sensation and occasionally start stabbing sensation.  It occurs despite wearing compression socks.  He denies any leg trauma, DVT, superficial phlebitis or nonhealing wounds.  He is on Coumadin for A. Fib.    Past Medical History:   Diagnosis Date     Arthritis      Cancer (H)      Depressive disorder      HTN      Past Surgical History:   Procedure Laterality Date     ABDOMEN SURGERY       ARTHROSCOPY KNEE RT/LT  2000    right     ARTHROSCOPY KNEE RT/LT  1998    left     C TOTAL KNEE ARTHROPLASTY  10/11    right knee     CATARACT IOL, RT/LT       COLONOSCOPY       PHACOEMULSIFICATION WITH STANDARD INTRAOCULAR LENS IMPLANT  1/2010; 2/2010    left eye; right eye     SUPRAPUBIC PROSTATECTOMY  2/11     VASCULAR SURGERY       Current Outpatient Medications   Medication     DAILY MULTIVITAMIN PO     loratadine (CLARITIN) 10 MG tablet     MAGNESIUM OR     metoprolol succinate ER (TOPROL-XL) 100 MG 24 hr tablet     omeprazole (PRILOSEC) 20 MG DR capsule     warfarin ANTICOAGULANT (COUMADIN) 5 MG tablet     No current facility-administered medications for this visit.      No Known Allergies  Social History     Socioeconomic History     Marital status:      Spouse name: Not on file     Number of  children: Not on file     Years of education: Not on file     Highest education level: Not on file   Occupational History     Not on file   Tobacco Use     Smoking status: Former Smoker     Packs/day: 1.50     Years: 34.00     Pack years: 51.00     Types: Cigarettes     Quit date: 1984     Years since quittin.8     Smokeless tobacco: Never Used     Tobacco comment: Nonsmoking household   Vaping Use     Vaping Use: Never used   Substance and Sexual Activity     Alcohol use: Yes     Comment: very lightly     Drug use: No     Sexual activity: Not Currently     Partners: Female     Birth control/protection: Implant   Other Topics Concern     Parent/sibling w/ CABG, MI or angioplasty before 65F 55M? No   Social History Narrative     Not on file     Social Determinants of Health     Financial Resource Strain: Not on file   Food Insecurity: Not on file   Transportation Needs: Not on file   Physical Activity: Not on file   Stress: Not on file   Social Connections: Not on file   Intimate Partner Violence: Not on file   Housing Stability: Not on file     Family History   Problem Relation Age of Onset     Cancer Mother         leukemia     Depression Other      Depression Other      Prostate Cancer Brother      Prostate Cancer Brother      Prostate Cancer Brother       ROS: 10 point ROS neg other than the symptoms noted above in the HPI.    PE:  B/P: Data Unavailable, T: Data Unavailable, P: Data Unavailable, R: Data Unavailable  General: well developed, well nourished WMwho appears their stated age  HEENT: NC/AT, EOMI, (-)icterus, (-)injection  Neck: Supple, No JVD  Chest: CTA  Heart: Irreg  Abd: Soft, non tender, non distended, non tender, no masses  Ext; Warm, +1 edema bilateral with scattered varicosities.  Bilateral stasis changes.  Tender mass dorsum of left foot ?  Neuroma versus varicosity.  Psych: AAOx3  Neuro: No focal deficits    Impression/plan:  This is a 76-year-old gentleman with a painful lump on the  dorsum of his left foot.  It is unclear whether it represents a varicosity versus neuroma.  He does have bilateral varicose veins and is a CEAP 4 bilaterally.  All his symptoms are controlled with compression other than this mass on the top of the foot.  I discussed the possible etiologies of this mass as well as the role of ultrasound and the patient expressed understanding.  After discussion the patient at this time is obtain ultrasound of his left lower extremity for reflux and also to further evaluate the mass.  He will follow with me after the ultrasound.    Matthew Landry MD, FACS      Again, thank you for allowing me to participate in the care of your patient.        Sincerely,        Matthew Landry MD

## 2021-12-21 ENCOUNTER — LAB (OUTPATIENT)
Dept: LAB | Facility: CLINIC | Age: 76
End: 2021-12-21
Payer: MEDICARE

## 2021-12-21 ENCOUNTER — ANTICOAGULATION THERAPY VISIT (OUTPATIENT)
Dept: ANTICOAGULATION | Facility: CLINIC | Age: 76
End: 2021-12-21

## 2021-12-21 DIAGNOSIS — I48.0 PAROXYSMAL ATRIAL FIBRILLATION (H): ICD-10-CM

## 2021-12-21 DIAGNOSIS — I48.0 PAROXYSMAL ATRIAL FIBRILLATION (H): Primary | ICD-10-CM

## 2021-12-21 LAB — INR BLD: 2.6 (ref 0.9–1.1)

## 2021-12-21 PROCEDURE — 36415 COLL VENOUS BLD VENIPUNCTURE: CPT

## 2021-12-21 PROCEDURE — 85610 PROTHROMBIN TIME: CPT

## 2021-12-21 NOTE — PROGRESS NOTES
ANTICOAGULATION MANAGEMENT     Rian Ness 76 year old male is on warfarin with therapeutic INR result. (Goal INR 2.0-3.0)    Recent labs: (last 7 days)     12/21/21  0924   INR 2.6*       ASSESSMENT     Source(s): Chart Review and Patient/Caregiver Call       Warfarin doses taken: Warfarin taken as instructed    Diet: No new diet changes identified    New illness, injury, or hospitalization: No    Medication/supplement changes: None noted    Signs or symptoms of bleeding or clotting: No    Previous INR: Therapeutic last 2(+) visits    Additional findings: None     PLAN     Recommended plan for no diet, medication or health factor changes affecting INR     Dosing Instructions: Continue your current warfarin dose with next INR in 6 weeks       Summary  As of 12/21/2021    Full warfarin instructions:  5 mg every day   Next INR check:  2/1/2022             Telephone call with Rian who verbalizes understanding and agrees to plan    Lab visit scheduled    Education provided: Please call back if any changes to your diet, medications or how you've been taking warfarin and Contact [phone]  with any changes, questions or concerns.     Plan made per [facility] anticoagulation protocol    Charley Francisco RN  Anticoagulation Clinic  12/21/2021    _______________________________________________________________________     Anticoagulation Episode Summary     Current INR goal:  2.0-3.0   TTR:  100.0 % (1 y)   Target end date:  Indefinite   Send INR reminders to:  [facility]    Indications    Paroxysmal atrial fibrillation (H) [I48.0]           Comments:  referral renewed 11/16/20         Anticoagulation Care Providers     Provider Role Specialty Phone number    Junie Golden MD Referring Family Medicine [phone]

## 2022-01-04 ENCOUNTER — ANCILLARY PROCEDURE (OUTPATIENT)
Dept: ULTRASOUND IMAGING | Facility: CLINIC | Age: 77
End: 2022-01-04
Attending: SPECIALIST
Payer: MEDICARE

## 2022-01-04 ENCOUNTER — OFFICE VISIT (OUTPATIENT)
Dept: VASCULAR SURGERY | Facility: CLINIC | Age: 77
End: 2022-01-04
Attending: SPECIALIST
Payer: MEDICARE

## 2022-01-04 DIAGNOSIS — I83.812 VARICOSE VEINS OF LEFT LOWER EXTREMITY WITH PAIN: Primary | ICD-10-CM

## 2022-01-04 DIAGNOSIS — I87.2 STASIS DERMATITIS OF BOTH LEGS: ICD-10-CM

## 2022-01-04 DIAGNOSIS — I83.812 VARICOSE VEINS OF LEFT LOWER EXTREMITY WITH PAIN: ICD-10-CM

## 2022-01-04 DIAGNOSIS — I83.893 VARICOSE VEINS OF LEG WITH EDEMA, BILATERAL: ICD-10-CM

## 2022-01-04 PROCEDURE — 93971 EXTREMITY STUDY: CPT | Mod: LT | Performed by: SPECIALIST

## 2022-01-04 PROCEDURE — 99213 OFFICE O/P EST LOW 20 MIN: CPT | Performed by: SPECIALIST

## 2022-01-04 NOTE — LETTER
1/4/2022         RE: Rian Ness  [address] [facility] [address]        Dear Colleague,    Thank you for referring your patient, Rian Ness, to the [facility] [place]. Please see a copy of my visit note below.    Follow-up for ultrasound    Subjective:  Patient is a 76-year-old white male with a longstanding history of bilateral lower extremity varicose veins.  He has been treated on and off over the past 30 to 40 years.  He has been wearing compression socks the whole time.  Recently he developed what he thinks is a painful vein on the top of his left foot.  He describes it as a burning sensation and occasionally start stabbing sensation.  It occurs despite wearing compression socks.  He denies any leg trauma, DVT, superficial phlebitis or nonhealing wounds.  He is on Coumadin for A. Fib.      Since he was last seen the painful lump on the dorsum of his foot is still present but the pain is now resolved.  He has had no pain for several weeks.  He had his ultrasound today for which he now follows up.      Objective:  B/P: Data Unavailable, T: Data Unavailable, P: Data Unavailable, R: Data Unavailable  Ext; Warm, +1 edema bilateral with scattered varicosities.  Bilateral stasis changes.   mass dorsum of left foot-nontender      US -   Verbal from technician-probable thrombosed anterior tibial vein on dorsum of foot.      Impression/plan:  This is a 76-year-old gentleman with a mass on the dorsum of his left foot that most likely represents a thrombosed anterior tibial vein.  He is no longer symptomatic and the pain has resolved.  He probably had a short-term superficial phlebitis of that area.  He does have bilateral varicose veins and is a CEAP 4 bilaterally.  All his symptoms are controlled with compression.  Continue him on his anticoagulation for A. fib and have him follow-up with us as necessary.  He knows to see if should his veins become symptomatic despite  compression therapy.    Matthew Landry MD, FACS      Again, thank you for allowing me to participate in the care of your patient.        Sincerely,        Matthew Landry MD

## 2022-01-04 NOTE — PROGRESS NOTES
Follow-up for ultrasound    Subjective:  Patient is a 76-year-old white male with a longstanding history of bilateral lower extremity varicose veins.  He has been treated on and off over the past 30 to 40 years.  He has been wearing compression socks the whole time.  Recently he developed what he thinks is a painful vein on the top of his left foot.  He describes it as a burning sensation and occasionally start stabbing sensation.  It occurs despite wearing compression socks.  He denies any leg trauma, DVT, superficial phlebitis or nonhealing wounds.  He is on Coumadin for A. Fib.      Since he was last seen the painful lump on the dorsum of his foot is still present but the pain is now resolved.  He has had no pain for several weeks.  He had his ultrasound today for which he now follows up.      Objective:  B/P: Data Unavailable, T: Data Unavailable, P: Data Unavailable, R: Data Unavailable  Ext; Warm, +1 edema bilateral with scattered varicosities.  Bilateral stasis changes.   mass dorsum of left foot-nontender      US -   Verbal from technician-probable thrombosed anterior tibial vein on dorsum of foot.      Impression/plan:  This is a 76-year-old gentleman with a mass on the dorsum of his left foot that most likely represents a thrombosed anterior tibial vein.  He is no longer symptomatic and the pain has resolved.  He probably had a short-term superficial phlebitis of that area.  He does have bilateral varicose veins and is a CEAP 4 bilaterally.  All his symptoms are controlled with compression.  Continue him on his anticoagulation for A. fib and have him follow-up with us as necessary.  He knows to see if should his veins become symptomatic despite compression therapy.    Matthew Landry MD, FACS

## 2022-02-01 ENCOUNTER — ANTICOAGULATION THERAPY VISIT (OUTPATIENT)
Dept: ANTICOAGULATION | Facility: CLINIC | Age: 77
End: 2022-02-01

## 2022-02-01 ENCOUNTER — LAB (OUTPATIENT)
Dept: LAB | Facility: CLINIC | Age: 77
End: 2022-02-01
Payer: MEDICARE

## 2022-02-01 DIAGNOSIS — I48.0 PAROXYSMAL ATRIAL FIBRILLATION (H): Primary | ICD-10-CM

## 2022-02-01 DIAGNOSIS — I48.0 PAROXYSMAL ATRIAL FIBRILLATION (H): ICD-10-CM

## 2022-02-01 LAB — INR BLD: 2.2 (ref 0.9–1.1)

## 2022-02-01 PROCEDURE — 85610 PROTHROMBIN TIME: CPT

## 2022-02-01 PROCEDURE — 36415 COLL VENOUS BLD VENIPUNCTURE: CPT

## 2022-02-01 NOTE — PROGRESS NOTES
ANTICOAGULATION MANAGEMENT     Rian Ness 76 year old male is on warfarin with therapeutic INR result. (Goal INR 2.0-3.0)    Recent labs: (last 7 days)     02/01/22  0929   INR 2.2*       ASSESSMENT     Source(s): Chart Review and Patient/Caregiver Call       Warfarin doses taken: Warfarin taken as instructed    Diet: No new diet changes identified    New illness, injury, or hospitalization: No    Medication/supplement changes: None noted    Signs or symptoms of bleeding or clotting: No    Previous INR: Therapeutic last 2(+) visits    Additional findings: None     PLAN     Recommended plan for no diet, medication or health factor changes affecting INR     Dosing Instructions: Continue your current warfarin dose with next INR in 6 weeks       Summary  As of 2/1/2022    Full warfarin instructions:  5 mg every day   Next INR check:  3/15/2022             Telephone call with Rian who verbalizes understanding and agrees to plan    Lab visit scheduled    Education provided: Goal range and significance of current result    Plan made per [facility] anticoagulation protocol    Quita Ahmadi RN  Anticoagulation Clinic  2/1/2022    _______________________________________________________________________     Anticoagulation Episode Summary     Current INR goal:  2.0-3.0   TTR:  100.0 % (1 y)   Target end date:  Indefinite   Send INR reminders to:  [facility]    Indications    Paroxysmal atrial fibrillation (H) [I48.0]           Comments:  referral renewed 11/16/20         Anticoagulation Care Providers     Provider Role Specialty Phone number    Junie Golden MD Referring Family Medicine [phone]

## 2022-02-21 ENCOUNTER — OFFICE VISIT (OUTPATIENT)
Dept: OPHTHALMOLOGY | Facility: CLINIC | Age: 77
End: 2022-02-21
Payer: MEDICARE

## 2022-02-21 DIAGNOSIS — Z01.01 ENCOUNTER FOR EXAMINATION OF EYES AND VISION WITH ABNORMAL FINDINGS: ICD-10-CM

## 2022-02-21 DIAGNOSIS — H43.813 POSTERIOR VITREOUS DETACHMENT, BILATERAL: ICD-10-CM

## 2022-02-21 DIAGNOSIS — Z96.1 PSEUDOPHAKIA: Primary | ICD-10-CM

## 2022-02-21 DIAGNOSIS — H52.4 PRESBYOPIA: ICD-10-CM

## 2022-02-21 PROCEDURE — 92015 DETERMINE REFRACTIVE STATE: CPT | Mod: GY | Performed by: OPHTHALMOLOGY

## 2022-02-21 PROCEDURE — 92014 COMPRE OPH EXAM EST PT 1/>: CPT | Performed by: OPHTHALMOLOGY

## 2022-02-21 ASSESSMENT — SLIT LAMP EXAM - LIDS
COMMENTS: 2+ DERMATOCHALASIS - UPPER LID
COMMENTS: 2+ DERMATOCHALASIS - UPPER LID

## 2022-02-21 ASSESSMENT — REFRACTION_WEARINGRX
OS_CYLINDER: +2.00
OS_AXIS: 175
OS_SPHERE: -1.50
OD_SPHERE: -1.00
OD_ADD: +2.50
SPECS_TYPE: BIFOCAL
OS_ADD: +2.50
OD_AXIS: 005
OD_CYLINDER: +1.75

## 2022-02-21 ASSESSMENT — VISUAL ACUITY
CORRECTION_TYPE: GLASSES
OD_CC: 20/20
OS_CC: 20/20
OD_CC: J1+ W/BE
METHOD: SNELLEN - LINEAR
OS_CC+: -1

## 2022-02-21 ASSESSMENT — CONF VISUAL FIELD
OD_NORMAL: 1
OS_NORMAL: 1

## 2022-02-21 ASSESSMENT — REFRACTION_MANIFEST
OD_CYLINDER: +1.50
OD_AXIS: 005
OD_SPHERE: -0.75
OS_CYLINDER: +1.75
OS_ADD: +2.75
OS_SPHERE: -1.25
OD_ADD: +2.75
OS_AXIS: 175

## 2022-02-21 ASSESSMENT — CUP TO DISC RATIO
OD_RATIO: 0.2
OS_RATIO: 0.4

## 2022-02-21 ASSESSMENT — TONOMETRY
OS_IOP_MMHG: 17
IOP_METHOD: APPLANATION
OD_IOP_MMHG: 16

## 2022-02-21 ASSESSMENT — EXTERNAL EXAM - LEFT EYE: OS_EXAM: 2+ BROW PTOSIS, PROLAPSED FAT PADS: UPPER, LOWER

## 2022-02-21 ASSESSMENT — EXTERNAL EXAM - RIGHT EYE: OD_EXAM: 2+ BROW PTOSIS, PROLAPSED FAT PADS: UPPER, LOWER

## 2022-02-21 NOTE — LETTER
2/21/2022         RE: Rian Ness  [address] [address] [facility] [id number]        Dear Colleague,    Thank you for referring your patient, Rian Ness, to the [facility]. Please see a copy of my visit note below.     Current Eye Medications:  AT's as needed - rarely     Subjective:  Here for complete eye exam. Vision is stable. Eyes are sometimes itchy.      Objective:  See Ophthalmology Exam.       Assessment:  Stable eye exam.      ICD-10-CM    1. 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 Posterior vitreous detachment, bilateral  H43.813    3. Encounter for examination of eyes and vision with abnormal findings  Z01.01    4. Presbyopia  H52.4         Plan:  Glasses Rx given - optional  May use artificial tears up to 4 times daily both eyes. (Refresh Tears, Systane Ultra/Balance, or Theratears)   Possible clouding of posterior capsule both eyes discussed.   Call in October 2022 for an appointment in February 2023 for Complete Exam    Dr. Abebe [phone]            Again, thank you for allowing me to participate in the care of your patient.        Sincerely,        Jared Abebe MD

## 2022-02-21 NOTE — PROGRESS NOTES
Current Eye Medications:  AT's as needed - rarely     Subjective:  Here for complete eye exam. Vision is stable. Eyes are sometimes itchy.      Objective:  See Ophthalmology Exam.       Assessment:  Stable eye exam.      ICD-10-CM    1. 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 Posterior vitreous detachment, bilateral  H43.813    3. Encounter for examination of eyes and vision with abnormal findings  Z01.01    4. Presbyopia  H52.4         Plan:  Glasses Rx given - optional  May use artificial tears up to 4 times daily both eyes. (Refresh Tears, Systane Ultra/Balance, or Theratears)   Possible clouding of posterior capsule both eyes discussed.   Call in October 2022 for an appointment in February 2023 for Complete Exam    Dr. Abebe [phone]

## 2022-02-21 NOTE — PATIENT INSTRUCTIONS
Glasses Rx given - optional  May use artificial tears up to 4 times daily both eyes. (Refresh Tears, Systane Ultra/Balance, or Theratears)   Possible clouding of posterior capsule both eyes discussed.   Call in October 2022 for an appointment in February 2023 for Complete Exam    Dr. Abebe [phone]

## 2022-03-15 ENCOUNTER — LAB (OUTPATIENT)
Dept: LAB | Facility: CLINIC | Age: 77
End: 2022-03-15
Payer: MEDICARE

## 2022-03-15 ENCOUNTER — ANTICOAGULATION THERAPY VISIT (OUTPATIENT)
Dept: ANTICOAGULATION | Facility: CLINIC | Age: 77
End: 2022-03-15

## 2022-03-15 DIAGNOSIS — I48.0 PAROXYSMAL ATRIAL FIBRILLATION (H): Primary | ICD-10-CM

## 2022-03-15 DIAGNOSIS — I48.0 PAROXYSMAL ATRIAL FIBRILLATION (H): ICD-10-CM

## 2022-03-15 LAB — INR BLD: 1.9 (ref 0.9–1.1)

## 2022-03-15 PROCEDURE — 85610 PROTHROMBIN TIME: CPT

## 2022-03-15 PROCEDURE — 36415 COLL VENOUS BLD VENIPUNCTURE: CPT

## 2022-03-15 NOTE — PROGRESS NOTES
ANTICOAGULATION MANAGEMENT     Rian Ness 76 year old male is on warfarin with subtherapeutic INR result. (Goal INR 2.0-3.0)    Recent labs: (last 7 days)     03/15/22  0931   INR 1.9*       ASSESSMENT       Source(s): Chart Review and Patient/Caregiver Call       Warfarin doses taken: Warfarin taken as instructed    Diet: No new diet changes identified    New illness, injury, or hospitalization: No    Medication/supplement changes: None noted    Signs or symptoms of bleeding or clotting: No    Previous INR: Therapeutic last 2(+) visits    Additional findings: None       PLAN     Recommended plan for no diet, medication or health factor changes affecting INR     Dosing Instructions: Booster dose then continue your current warfarin dose with next INR in 2 weeks       Summary  As of 3/15/2022    Full warfarin instructions:  5 mg every day   Next INR check:               Telephone call with Rian who verbalizes understanding and agrees to plan    Lab visit scheduled    Education provided: Please call back if any changes to your diet, medications or how you've been taking warfarin and Monitoring for clotting signs and symptoms    Plan made per [facility]agulation protocol    Rosa Maria Weathers RN  Anticoagulation Clinic  3/15/2022    _______________________________________________________________________     Anticoagulation Episode Summary     Current INR goal:  2.0-3.0   TTR:  96.2 % (1 y)   Target end date:  Indefinite   Send INR reminders to:  [facility]    Indications    Paroxysmal atrial fibrillation (H) [I48.0]           Comments:  referral renewed 11/16/20         Anticoagulation Care Providers     Provider Role Specialty Phone number    Junie Golden MD Referring Family Medicine [phone]

## 2022-03-29 ENCOUNTER — ANTICOAGULATION THERAPY VISIT (OUTPATIENT)
Dept: ANTICOAGULATION | Facility: CLINIC | Age: 77
End: 2022-03-29

## 2022-03-29 ENCOUNTER — LAB (OUTPATIENT)
Dept: LAB | Facility: CLINIC | Age: 77
End: 2022-03-29
Payer: MEDICARE

## 2022-03-29 DIAGNOSIS — I48.0 PAROXYSMAL ATRIAL FIBRILLATION (H): ICD-10-CM

## 2022-03-29 DIAGNOSIS — I48.0 PAROXYSMAL ATRIAL FIBRILLATION (H): Primary | ICD-10-CM

## 2022-03-29 LAB — INR BLD: 1.9 (ref 0.9–1.1)

## 2022-03-29 PROCEDURE — 36416 COLLJ CAPILLARY BLOOD SPEC: CPT

## 2022-03-29 PROCEDURE — 85610 PROTHROMBIN TIME: CPT

## 2022-03-29 NOTE — PROGRESS NOTES
ANTICOAGULATION MANAGEMENT     Rianerick Ness 76 year old male is on warfarin with subtherapeutic INR result. (Goal INR 2.0-3.0)    Recent labs: (last 7 days)     03/29/22  1252   INR 1.9*       ASSESSMENT       Source(s): Chart Review and Patient/Caregiver Call       Warfarin doses taken: Warfarin taken as instructed    Diet: No new diet changes identified    New illness, injury, or hospitalization: No    Medication/supplement changes: None noted    Signs or symptoms of bleeding or clotting: No    Previous INR: Subtherapeutic    Additional findings: None       PLAN     Recommended plan for no diet, medication or health factor changes affecting INR     Dosing Instructions:  Increase your warfarin dose (7.1% change) with next INR in 2 weeks       Summary  As of 3/29/2022    Full warfarin instructions:  7.5 mg every Tue; 5 mg all other days   Next INR check:  4/13/2022             Telephone call with Rian who verbalizes understanding and agrees to plan    Lab visit scheduled    Education provided: Goal range and significance of current result, Importance of therapeutic range and Importance of following up at instructed interval    Plan made per [facility]agulation protocol    Dave Alva RN  Anticoagulation Clinic  3/29/2022    _______________________________________________________________________     Anticoagulation Episode Summary     Current INR goal:  2.0-3.0   TTR:  92.3 % (1 y)   Target end date:  Indefinite   Send INR reminders to:  [facility]    Indications    Paroxysmal atrial fibrillation (H) [I48.0]           Comments:  referral renewed 11/16/20         Anticoagulation Care Providers     Provider Role Specialty Phone number    Junie Golden MD Referring Family Medicine [phone]

## 2022-04-06 DIAGNOSIS — I10 HYPERTENSION GOAL BP (BLOOD PRESSURE) < 140/90: ICD-10-CM

## 2022-04-06 DIAGNOSIS — R73.09 ELEVATED GLUCOSE: ICD-10-CM

## 2022-04-06 DIAGNOSIS — J30.2 SEASONAL ALLERGIC RHINITIS, UNSPECIFIED TRIGGER: ICD-10-CM

## 2022-04-06 DIAGNOSIS — C61 MALIGNANT NEOPLASM PROSTATE (H): Primary | ICD-10-CM

## 2022-04-06 RX ORDER — LORATADINE 10 MG/1
TABLET ORAL
Qty: 90 TABLET | Refills: 1 | Status: SHIPPED | OUTPATIENT
Start: 2022-04-06 | End: 2022-10-11

## 2022-04-06 NOTE — TELEPHONE ENCOUNTER
Routing refill request to provider for review/approval because:  Patient is > 64 years old so RN unable to refill this medication per protocol.    Routing to provider to advise.  Martha Prince BSN, RN

## 2022-04-13 ENCOUNTER — ANTICOAGULATION THERAPY VISIT (OUTPATIENT)
Dept: ANTICOAGULATION | Facility: CLINIC | Age: 77
End: 2022-04-13

## 2022-04-13 ENCOUNTER — LAB (OUTPATIENT)
Dept: LAB | Facility: CLINIC | Age: 77
End: 2022-04-13
Payer: MEDICARE

## 2022-04-13 DIAGNOSIS — I48.0 PAROXYSMAL ATRIAL FIBRILLATION (H): Primary | ICD-10-CM

## 2022-04-13 DIAGNOSIS — I10 HYPERTENSION GOAL BP (BLOOD PRESSURE) < 140/90: ICD-10-CM

## 2022-04-13 DIAGNOSIS — I48.0 PAROXYSMAL ATRIAL FIBRILLATION (H): ICD-10-CM

## 2022-04-13 DIAGNOSIS — C61 MALIGNANT NEOPLASM PROSTATE (H): ICD-10-CM

## 2022-04-13 LAB
ANION GAP SERPL CALCULATED.3IONS-SCNC: 11 MMOL/L (ref 3–14)
BUN SERPL-MCNC: 22 MG/DL (ref 7–30)
CALCIUM SERPL-MCNC: 8.7 MG/DL (ref 8.5–10.1)
CHLORIDE BLD-SCNC: 106 MMOL/L (ref 94–109)
CO2 SERPL-SCNC: 25 MMOL/L (ref 20–32)
CREAT SERPL-MCNC: 1.18 MG/DL (ref 0.66–1.25)
GFR SERPL CREATININE-BSD FRML MDRD: 63 ML/MIN/1.73M2
GLUCOSE BLD-MCNC: 130 MG/DL (ref 70–99)
INR BLD: 3 (ref 0.9–1.1)
POTASSIUM BLD-SCNC: 4.5 MMOL/L (ref 3.4–5.3)
PSA SERPL-MCNC: 0.1 UG/L (ref 0–4)
SODIUM SERPL-SCNC: 142 MMOL/L (ref 133–144)

## 2022-04-13 PROCEDURE — 36415 COLL VENOUS BLD VENIPUNCTURE: CPT

## 2022-04-13 PROCEDURE — 85610 PROTHROMBIN TIME: CPT

## 2022-04-13 PROCEDURE — 80048 BASIC METABOLIC PNL TOTAL CA: CPT

## 2022-04-13 PROCEDURE — 84153 ASSAY OF PSA TOTAL: CPT

## 2022-04-13 NOTE — PROGRESS NOTES
ANTICOAGULATION MANAGEMENT     Rian Ness 76 year old male is on warfarin with therapeutic INR result. (Goal INR 2.0-3.0)    Recent labs: (last 7 days)     04/13/22  1124   INR 3.0*       ASSESSMENT       Source(s): Chart Review and Patient/Caregiver Call       Warfarin doses taken: More warfarin taken than planned which may be affecting INR    Diet: No new diet changes identified    New illness, injury, or hospitalization: No    Medication/supplement changes: None noted    Signs or symptoms of bleeding or clotting: No    Previous INR: Subtherapeutic    Additional findings: pt thought he was to take 7.5mg twice weekly, was taking tues and thurs. will keep same weekly dosing, but 7.5mg on Tues and Saturday.       PLAN     Recommended plan for no diet, medication or health factor changes affecting INR     Dosing Instructions: continue your current warfarin dose with next INR in 1 week       Summary  As of 4/13/2022    Full warfarin instructions:  7.5 mg every Tue, Sat; 5 mg all other days   Next INR check:  4/21/2022             Telephone call with Rian who verbalizes understanding and agrees to plan    Lab visit scheduled    Education provided: Goal range and significance of current result and Importance of taking warfarin as instructed    Plan made per [facility]agulation protocol    Ana Laura Orozco RN  Anticoagulation Clinic  4/13/2022    _______________________________________________________________________     Anticoagulation Episode Summary     Current INR goal:  2.0-3.0   TTR:  91.9 % (1 y)   Target end date:  Indefinite   Send INR reminders to:  [facility]    Indications    Paroxysmal atrial fibrillation (H) [I48.0]           Comments:  referral renewed 11/16/20         Anticoagulation Care Providers     Provider Role Specialty Phone number    Junie Golden MD Referring Family Medicine [phone]

## 2022-04-21 ENCOUNTER — LAB (OUTPATIENT)
Dept: LAB | Facility: CLINIC | Age: 77
End: 2022-04-21
Payer: MEDICARE

## 2022-04-21 ENCOUNTER — ANTICOAGULATION THERAPY VISIT (OUTPATIENT)
Dept: ANTICOAGULATION | Facility: CLINIC | Age: 77
End: 2022-04-21

## 2022-04-21 DIAGNOSIS — I48.0 PAROXYSMAL ATRIAL FIBRILLATION (H): ICD-10-CM

## 2022-04-21 DIAGNOSIS — R73.09 ELEVATED GLUCOSE: ICD-10-CM

## 2022-04-21 DIAGNOSIS — I48.0 PAROXYSMAL ATRIAL FIBRILLATION (H): Primary | ICD-10-CM

## 2022-04-21 DIAGNOSIS — I48.0 PAF (PAROXYSMAL ATRIAL FIBRILLATION) (H): ICD-10-CM

## 2022-04-21 LAB — INR BLD: 3.5 (ref 0.9–1.1)

## 2022-04-21 PROCEDURE — 36415 COLL VENOUS BLD VENIPUNCTURE: CPT

## 2022-04-21 PROCEDURE — 85610 PROTHROMBIN TIME: CPT

## 2022-04-21 NOTE — PROGRESS NOTES
ANTICOAGULATION MANAGEMENT     Rian ZELAYA Grover 76 year old male is on warfarin with supratherapeutic INR result. (Goal INR 2.0-3.0)    Recent labs: (last 7 days)     04/21/22  1424   INR 3.5*       ASSESSMENT       Source(s): Chart Review and Patient/Caregiver Call       Warfarin doses taken: Warfarin taken as instructed    Diet: No new diet changes identified    New illness, injury, or hospitalization: No    Medication/supplement changes: None noted    Signs or symptoms of bleeding or clotting: No    Previous INR: Supratherapeutic    Additional findings: None       PLAN     Recommended plan for no diet, medication or health factor changes affecting INR     Dosing Instructions: partial hold then decrease your warfarin dose (6.2% change) with next INR in 2 weeks       Summary  As of 4/21/2022    Full warfarin instructions:  4/21: 2.5 mg; Otherwise 7.5 mg every Tue; 5 mg all other days   Next INR check:  5/5/2022             Telephone call with Rian who verbalizes understanding and agrees to plan    Lab visit scheduled    Education provided: Potential interaction between warfarin and alcohol, Goal range and significance of current result and Importance of therapeutic range    Plan made per [facility]agulation protocol    Dave Alva RN  Anticoagulation Clinic  4/21/2022    _______________________________________________________________________     Anticoagulation Episode Summary     Current INR goal:  2.0-3.0   TTR:  89.7 % (1 y)   Target end date:  Indefinite   Send INR reminders to:  [facility]    Indications    Paroxysmal atrial fibrillation (H) [I48.0]           Comments:  referral renewed 11/16/20         Anticoagulation Care Providers     Provider Role Specialty Phone number    Junie Golden MD Referring Family Medicine [phone]

## 2022-04-22 RX ORDER — METOPROLOL SUCCINATE 100 MG/1
100 TABLET, EXTENDED RELEASE ORAL DAILY
Qty: 90 TABLET | Refills: 3 | Status: SHIPPED | OUTPATIENT
Start: 2022-04-22 | End: 2023-07-12

## 2022-05-05 ENCOUNTER — ANTICOAGULATION THERAPY VISIT (OUTPATIENT)
Dept: ANTICOAGULATION | Facility: CLINIC | Age: 77
End: 2022-05-05

## 2022-05-05 ENCOUNTER — LAB (OUTPATIENT)
Dept: LAB | Facility: CLINIC | Age: 77
End: 2022-05-05
Payer: MEDICARE

## 2022-05-05 DIAGNOSIS — I48.0 PAROXYSMAL ATRIAL FIBRILLATION (H): Primary | ICD-10-CM

## 2022-05-05 DIAGNOSIS — I48.0 PAROXYSMAL ATRIAL FIBRILLATION (H): ICD-10-CM

## 2022-05-05 LAB — INR BLD: 2.4 (ref 0.9–1.1)

## 2022-05-05 PROCEDURE — 85610 PROTHROMBIN TIME: CPT

## 2022-05-05 PROCEDURE — 36416 COLLJ CAPILLARY BLOOD SPEC: CPT

## 2022-05-05 NOTE — PROGRESS NOTES
ANTICOAGULATION MANAGEMENT     Rian ZELAYA Grover 76 year old male is on warfarin with therapeutic INR result. (Goal INR 2.0-3.0)    Recent labs: (last 7 days)     05/05/22  1031   INR 2.4*       ASSESSMENT       Source(s): Chart Review and Patient/Caregiver Call       Warfarin doses taken: Warfarin taken as instructed    Diet: No new diet changes identified    New illness, injury, or hospitalization: No    Medication/supplement changes: None noted    Signs or symptoms of bleeding or clotting: No    Previous INR: Supratherapeutic    Additional findings: None       PLAN     Recommended plan for no diet, medication or health factor changes affecting INR     Dosing Instructions: continue your current warfarin dose with next INR in 3 weeks       Summary  As of 5/5/2022    Full warfarin instructions:  7.5 mg every Tue; 5 mg all other days   Next INR check:               Telephone call with Rian who verbalizes understanding and agrees to plan and who agrees to plan and repeated back plan correctly    Lab visit scheduled    Education provided: Goal range and significance of current result and Contact [phone]  with any changes, questions or concerns.     Plan made per [facility]agulation protocol    Ana Laura Orozco RN  Anticoagulation Clinic  5/5/2022    _______________________________________________________________________     Anticoagulation Episode Summary     Current INR goal:  2.0-3.0   TTR:  88.0 % (1 y)   Target end date:  Indefinite   Send INR reminders to:  [facility]    Indications    Paroxysmal atrial fibrillation (H) [I48.0]           Comments:  referral renewed 11/16/20         Anticoagulation Care Providers     Provider Role Specialty Phone number    Junie Golden MD Referring Family Medicine [phone]

## 2022-05-08 ENCOUNTER — HEALTH MAINTENANCE LETTER (OUTPATIENT)
Age: 77
End: 2022-05-08

## 2022-05-26 ENCOUNTER — ANTICOAGULATION THERAPY VISIT (OUTPATIENT)
Dept: ANTICOAGULATION | Facility: CLINIC | Age: 77
End: 2022-05-26

## 2022-05-26 ENCOUNTER — LAB (OUTPATIENT)
Dept: LAB | Facility: CLINIC | Age: 77
End: 2022-05-26
Payer: MEDICARE

## 2022-05-26 DIAGNOSIS — I48.0 PAROXYSMAL ATRIAL FIBRILLATION (H): ICD-10-CM

## 2022-05-26 DIAGNOSIS — I48.0 PAROXYSMAL ATRIAL FIBRILLATION (H): Primary | ICD-10-CM

## 2022-05-26 LAB — INR BLD: 2.1 (ref 0.9–1.1)

## 2022-05-26 PROCEDURE — 36416 COLLJ CAPILLARY BLOOD SPEC: CPT

## 2022-05-26 PROCEDURE — 85610 PROTHROMBIN TIME: CPT

## 2022-05-26 NOTE — PROGRESS NOTES
ANTICOAGULATION MANAGEMENT     Rian Ness 76 year old male is on warfarin with therapeutic INR result. (Goal INR 2.0-3.0)    Recent labs: (last 7 days)     05/26/22  0934   INR 2.1*       ASSESSMENT       Source(s): Chart Review and Patient/Caregiver Call       Warfarin doses taken: Warfarin taken as instructed    Diet: No new diet changes identified    New illness, injury, or hospitalization: Yes: states he has allergy symptoms is taking loratadine for this     Medication/supplement changes: None noted    Signs or symptoms of bleeding or clotting: No    Previous INR: Therapeutic last visit; previously outside of goal range    Additional findings: None       PLAN     Recommended plan for temporary change(s) affecting INR     Dosing Instructions: continue your current warfarin dose with next INR in 4 weeks       Summary  As of 5/26/2022    Full warfarin instructions:  7.5 mg every Tue; 5 mg all other days   Next INR check:  6/23/2022             Telephone call with Rian who verbalizes understanding and agrees to plan    Lab visit scheduled    Education provided: Monitoring for bleeding signs and symptoms and Monitoring for clotting signs and symptoms    Plan made per [facility]agulation protocol    Luda Grissom RN  Anticoagulation Clinic  5/26/2022    _______________________________________________________________________     Anticoagulation Episode Summary     Current INR goal:  2.0-3.0   TTR:  88.0 % (1 y)   Target end date:  Indefinite   Send INR reminders to:  [facility]    Indications    Paroxysmal atrial fibrillation (H) [I48.0]           Comments:  referral renewed 11/16/20         Anticoagulation Care Providers     Provider Role Specialty Phone number    Junie Golden MD Referring Family Medicine [phone]

## 2022-06-09 NOTE — PROGRESS NOTES
"  Assessment & Plan      (Z00.00) Encounter for Medicare annual wellness exam  (primary encounter diagnosis)  Comment: preventive needs reviewed   Plan: see orders in Epic.   Encouraged to be active, find support group for loss of spouse    (I48.19) Persistent atrial fibrillation (H)  (I10) Hypertension goal BP (blood pressure) < 140/90  Comment: stable, controlled  Plan: Lipid panel reflex to direct LDL Fasting        Continue metoprolol, refilled by cardiology    (R73.09) Elevated glucose  Comment: on past labs  Plan: A1C today    (C61) Malignant neoplasm prostate (H)  Comment: PSA stable  Plan: continue yearly psa to monitor    (K21.9) Gastroesophageal reflux disease without esophagitis  Comment: controlled  Plan: omeprazole (PRILOSEC) 20 MG DR capsule        Refill x 1 yr       (Z13.6) CARDIOVASCULAR SCREENING; LDL GOAL LESS THAN 130  Comment: due  Plan: Lipid panel reflex to direct LDL Fasting            (Z23) High priority for 2019-nCoV vaccine  Comment: due  Plan: COVID-19,PF,PFIZER (12+ Yrs GRAY LABEL)                        BMI:   Estimated body mass index is 32.78 kg/m  as calculated from the following:    Height as of this encounter: 1.727 m (5' 8\").    Weight as of this encounter: 97.8 kg (215 lb 9.6 oz).   Weight management plan: Discussed healthy diet and exercise guidelines    See Patient Instructions    Return in about 53 weeks (around 6/22/2023) for Annual Wellness Visit.    Junie Golden MD  [facility]    Rose Modi is a 76 year old who presents for the following health issues     History of Present Illness       Reason for visit:  Headache  (Comes and goes, most day has headache )  Symptom onset:  More than a month (Worse x 8 months )  Symptoms include:  Sinus congestion, sinus pressure   Symptom progression:  Staying the same  Had these symptoms before:  YesHe consumes 1 sweetened beverage(s) daily.He exercises with enough effort to increase his heart rate 10 to " "19 minutes per day.  He exercises with enough effort to increase his heart rate 3 or less days per week.   He is taking medications regularly.        Annual Wellness Visit    Patient has been advised of split billing requirements and indicates understanding: Yes     Are you in the first 12 months of your Medicare Part B coverage?  No    Physical Health:    In general, how would you rate your overall physical health? fair    Outside of work, how many days during the week do you exercise?working around house     Outside of work, approximately how many minutes a day do you exercise?not applicable    If you drink alcohol do you typically have >3 drinks per day or >7 drinks per week? No    Do you usually eat at least 4 servings of fruit and vegetables a day, include whole grains & fiber and avoid regularly eating high fat or \"junk\" foods? NO    Do you have any problems taking medications regularly? No    Do you have any side effects from medications? none    Needs assistance for the following daily activities: no assistance needed    Which of the following safety concerns are present in your home?  none identified     Hearing impairment: Yes, Need to ask people to speak up or repeat themselves.    In the past 6 months, have you been bothered by leaking of urine? no    Mental Health:    In general, how would you rate your overall mental or emotional health? poor  PHQ-2 Score: 2    Do you feel safe in your environment? Yes    Have you ever done Advance Care Planning? (For example, a Health Directive, POLST, or a discussion with a medical provider or your loved ones about your wishes)? No, advance care planning information given to patient to review.  Patient plans to discuss their wishes with loved ones or provider.      Fall risk:  Fallen 2 or more times in the past year?: No  Any fall with injury in the past year?: No    Cognitive Screenin) Repeat 3 items (Leader, Season, Table)    2) Clock draw: NORMAL  3) 3 item " recall: Recalls 3 objects  Results: NORMAL clock, 1-2 items recalled: COGNITIVE IMPAIRMENT LESS LIKELY    Mini-CogTM Copyright S Sara. Licensed by the author for use in [facility]; reprinted with permission (phuong@.[email or web address]). All rights reserved.      Do you have sleep apnea, excessive snoring or daytime drowsiness?: snoring     Current providers sharing in care for this patient include:   Patient Care Team:  Junie Golden MD as PCP - General (Family Practice)  Junie Golden MD as Assigned PCP  Yash Stevens MD as MD (Cardiology)  Yash Stevens MD as Assigned Heart and Vascular Provider  Jared Abebe MD as Assigned Surgical Provider      Family history of prostate cancer: Yes pt has   Last PSA:   PSA   Date Value Ref Range Status   2021 0.09 0 - 4 ug/L Final     Comment:     Assay Method:  Chemiluminescence using Siemens Vista analyzer     PSA Tumor Marker   Date Value Ref Range Status   2022 0.10 0.00 - 4.00 ug/L Final     Doing self testicular exam: NO     Family history of colon cancer:No  Last colonscopy: , no longer screening     Family history of CAD:No  Last Cholesterol:   Lab Results   Component Value Date    CHOL 165 11/15/2017     Lab Results   Component Value Date    HDL 46 11/15/2017     Lab Results   Component Value Date    LDL 93 11/15/2017     Lab Results   Component Value Date    TRIG 130 11/15/2017     Lab Results   Component Value Date    CHOLHDLRATIO 4.9 2012          Immunizations:    Td tdap 2012  Covid:Pf 2021  Flu 2021  Shingrix: will check on cost  Pneumovax done         Seat Belt:YES   Sunscreen use: YES  Calcium Intake: adeq  Health Care Directive:NO   Sexually Active:NO      Current contraception: none     Current Concerns: spouse  4 months ago. Still adjusting, + sadness.          Patient Ed:  Reviewed health maintenance including diet, regular exercise, and periodic exams.     The risks, benefits, and treatment options  of prescribed medications or other treatments have been discussed with the patient.  The patient should call or schedule a follow up appt if no improvement or other problems.   Labs reviewed in EPIC  BP Readings from Last 3 Encounters:   22 126/78   21 139/83   21 130/86    Wt Readings from Last 3 Encounters:   22 97.8 kg (215 lb 9.6 oz)   21 94.5 kg (208 lb 6.4 oz)   21 97.5 kg (215 lb)                  Patient Active Problem List   Diagnosis     Testicular hypofunction     Osteoarthritis, knee     Malignant neoplasm prostate (H)     CARDIOVASCULAR SCREENING; LDL GOAL LESS THAN 130     Advanced directives, counseling/discussion     Status post total knee replacement     AC (acromioclavicular) joint arthritis     Biceps tendonitis     Pseudophakia,ou     Hypertension goal BP (blood pressure) < 140/90     Gastroesophageal reflux disease without esophagitis     Posterior vitreous detachment, bilateral     S/P complete repair of rotator cuff     Paroxysmal atrial fibrillation (H)     Past Surgical History:   Procedure Laterality Date     ABDOMEN SURGERY       ARTHROSCOPY KNEE RT/LT      right     ARTHROSCOPY KNEE RT/LT      left     CATARACT IOL, RT/LT       COLONOSCOPY       PHACOEMULSIFICATION WITH STANDARD INTRAOCULAR LENS IMPLANT  2010; 2010    left eye; right eye     SUPRAPUBIC PROSTATECTOMY       VASCULAR SURGERY       ZC TOTAL KNEE ARTHROPLASTY  10/11    right knee       Social History     Tobacco Use     Smoking status: Former Smoker     Packs/day: 1.50     Years: 34.00     Pack years: 51.00     Types: Cigarettes     Quit date: 1984     Years since quittin.3     Smokeless tobacco: Never Used     Tobacco comment: Nonsmoking household   Substance Use Topics     Alcohol use: Yes     Comment: very lightly     Family History   Problem Relation Age of Onset     Cancer Mother         leukemia     Depression Other      Depression Other      Prostate Cancer  "Brother      Prostate Cancer Brother      Prostate Cancer Brother          Current Outpatient Medications   Medication Sig Dispense Refill     DAILY MULTIVITAMIN PO 1 tab daily       loratadine (CLARITIN) 10 MG tablet TAKE 1 TABLET(10 MG) BY MOUTH DAILY 90 tablet 1     MAGNESIUM OR 1 tablet daily       metoprolol succinate ER (TOPROL-XL) 100 MG 24 hr tablet TAKE 1 TABLET (100 MG) BY MOUTH DAILY 90 tablet 3     omeprazole (PRILOSEC) 20 MG DR capsule TAKE 1 CAPSULE TWICE DAILY 180 capsule 3     warfarin ANTICOAGULANT (COUMADIN) 5 MG tablet Take by mouth 2.5mg on 4/21/22 ONLY and then take 7.5mg (1.5 tablets) on Tuesdays; 5mg (1 tablet) all other days of the week or as directed by the anticoagulation clinic 100 tablet 1        Review of Systems   Constitutional, HEENT, cardiovascular, pulmonary, gi and gu systems are negative, except as otherwise noted.      Objective    /78   Pulse (!) 121   Temp 98.2  F (36.8  C) (Oral)   Resp 22   Ht 1.727 m (5' 8\")   Wt 97.8 kg (215 lb 9.6 oz)   SpO2 93%   BMI 32.78 kg/m    Body mass index is 32.78 kg/m .  Physical Exam   GENERAL: healthy, alert and no distress  EYES: Eyes grossly normal to inspection, PERRL and conjunctivae and sclerae normal  HENT: ear canals and TM's normal, nose and mouth without ulcers or lesions  NECK: no adenopathy, no asymmetry, masses, or scars and thyroid normal to palpation  RESP: lungs clear to auscultation - no rales, rhonchi or wheezes  CV: regular rate and rhythm, normal S1 S2, no S3 or S4, no murmur, click or rub, no peripheral edema and peripheral pulses strong  ABDOMEN: soft, nontender, no hepatosplenomegaly, no masses and bowel sounds normal  MS: no gross musculoskeletal defects noted, no edema  SKIN: no suspicious lesions or rashes  NEURO: Normal strength and tone, mentation intact and speech normal  PSYCH: mentation appears normal, affect normal/bright                "

## 2022-06-16 ENCOUNTER — OFFICE VISIT (OUTPATIENT)
Dept: FAMILY MEDICINE | Facility: CLINIC | Age: 77
End: 2022-06-16
Payer: MEDICARE

## 2022-06-16 VITALS
RESPIRATION RATE: 22 BRPM | SYSTOLIC BLOOD PRESSURE: 126 MMHG | HEART RATE: 96 BPM | HEIGHT: 68 IN | BODY MASS INDEX: 32.67 KG/M2 | OXYGEN SATURATION: 93 % | TEMPERATURE: 98.2 F | DIASTOLIC BLOOD PRESSURE: 78 MMHG | WEIGHT: 215.6 LBS

## 2022-06-16 DIAGNOSIS — Z23 HIGH PRIORITY FOR 2019-NCOV VACCINE: ICD-10-CM

## 2022-06-16 DIAGNOSIS — I10 HYPERTENSION GOAL BP (BLOOD PRESSURE) < 140/90: ICD-10-CM

## 2022-06-16 DIAGNOSIS — I48.19 PERSISTENT ATRIAL FIBRILLATION (H): ICD-10-CM

## 2022-06-16 DIAGNOSIS — Z13.6 CARDIOVASCULAR SCREENING; LDL GOAL LESS THAN 130: ICD-10-CM

## 2022-06-16 DIAGNOSIS — C61 MALIGNANT NEOPLASM PROSTATE (H): ICD-10-CM

## 2022-06-16 DIAGNOSIS — Z00.00 ENCOUNTER FOR MEDICARE ANNUAL WELLNESS EXAM: Primary | ICD-10-CM

## 2022-06-16 DIAGNOSIS — R73.09 ELEVATED GLUCOSE: ICD-10-CM

## 2022-06-16 DIAGNOSIS — K21.9 GASTROESOPHAGEAL REFLUX DISEASE WITHOUT ESOPHAGITIS: ICD-10-CM

## 2022-06-16 LAB
CHOLEST SERPL-MCNC: 160 MG/DL
FASTING STATUS PATIENT QL REPORTED: NO
HBA1C MFR BLD: 6 % (ref 0–5.6)
HDLC SERPL-MCNC: 35 MG/DL
LDLC SERPL CALC-MCNC: 97 MG/DL
NONHDLC SERPL-MCNC: 125 MG/DL
TRIGL SERPL-MCNC: 138 MG/DL

## 2022-06-16 PROCEDURE — 80061 LIPID PANEL: CPT | Performed by: FAMILY MEDICINE

## 2022-06-16 PROCEDURE — 0054A COVID-19,PF,PFIZER (12+ YRS): CPT | Performed by: FAMILY MEDICINE

## 2022-06-16 PROCEDURE — 36415 COLL VENOUS BLD VENIPUNCTURE: CPT | Performed by: FAMILY MEDICINE

## 2022-06-16 PROCEDURE — 83036 HEMOGLOBIN GLYCOSYLATED A1C: CPT | Performed by: FAMILY MEDICINE

## 2022-06-16 PROCEDURE — 91305 COVID-19,PF,PFIZER (12+ YRS): CPT | Performed by: FAMILY MEDICINE

## 2022-06-16 PROCEDURE — 99214 OFFICE O/P EST MOD 30 MIN: CPT | Mod: 25 | Performed by: FAMILY MEDICINE

## 2022-06-16 PROCEDURE — G0439 PPPS, SUBSEQ VISIT: HCPCS | Performed by: FAMILY MEDICINE

## 2022-06-16 NOTE — PATIENT INSTRUCTIONS
Patient Education   Personalized Prevention Plan  You are due for the preventive services outlined below.  Your care team is available to assist you in scheduling these services.  If you have already completed any of these items, please share that information with your care team to update in your medical record.  Health Maintenance Due   Topic Date Due     ANNUAL REVIEW OF HM ORDERS  Never done     Zoster (Shingles) Vaccine (1 of 2) Never done     COVID-19 Vaccine (4 - Booster for Pfizer series) 03/04/2022

## 2022-06-16 NOTE — NURSING NOTE
"Chief Complaint   Patient presents with     Headache       Initial /78   Pulse (!) 121   Temp 98.2  F (36.8  C) (Oral)   Resp 22   Ht 1.727 m (5' 8\")   Wt 97.8 kg (215 lb 9.6 oz)   SpO2 93%   BMI 32.78 kg/m   Estimated body mass index is 32.78 kg/m  as calculated from the following:    Height as of this encounter: 1.727 m (5' 8\").    Weight as of this encounter: 97.8 kg (215 lb 9.6 oz).  Medication Reconciliation: complete  Mat Obrien CMA    "

## 2022-06-16 NOTE — LETTER
June 27, 2022      Rian Ness  [id number] [place] [address]        Dear Rian,     Your cholesterol is fine.   Your A1C is in prediabetic range.  We will continue to monitor this yearly.       Resulted Orders   Lipid panel reflex to direct LDL Fasting   Result Value Ref Range    Cholesterol 160 <200 mg/dL    Triglycerides 138 <150 mg/dL    Direct Measure HDL 35 (L) >=40 mg/dL    LDL Cholesterol Calculated 97 <=100 mg/dL    Non HDL Cholesterol 125 <130 mg/dL    Patient Fasting > 8hrs? No     Narrative    Cholesterol  Desirable:  <200 mg/dL    Triglycerides  Normal:  Less than 150 mg/dL  Borderline High:  150-199 mg/dL  High:  200-499 mg/dL  Very High:  Greater than or equal to 500 mg/dL    Direct Measure HDL  Female:  Greater than or equal to 50 mg/dL   Male:  Greater than or equal to 40 mg/dL    LDL Cholesterol  Desirable:  <100mg/dL  Above Desirable:  100-129 mg/dL   Borderline High:  130-159 mg/dL   High:  160-189 mg/dL   Very High:  >= 190 mg/dL    Non HDL Cholesterol  Desirable:  130 mg/dL  Above Desirable:  130-159 mg/dL  Borderline High:  160-189 mg/dL  High:  190-219 mg/dL  Very High:  Greater than or equal to 220 mg/dL   Hemoglobin A1c   Result Value Ref Range    Hemoglobin A1C 6.0 (H) 0.0 - 5.6 %      Comment:      Normal <5.7%   Prediabetes 5.7-6.4%    Diabetes 6.5% or higher     Note: Adopted from ADA consensus guidelines.       If you have any questions or concerns, please call the clinic at the number listed above.       Sincerely,      Juine Golden MD

## 2022-06-21 ENCOUNTER — TELEPHONE (OUTPATIENT)
Dept: FAMILY MEDICINE | Facility: CLINIC | Age: 77
End: 2022-06-21
Payer: MEDICARE

## 2022-06-21 DIAGNOSIS — K21.9 GASTROESOPHAGEAL REFLUX DISEASE WITHOUT ESOPHAGITIS: ICD-10-CM

## 2022-06-21 NOTE — TELEPHONE ENCOUNTER
Remaining refill transferred to the new pharmacy. Patient notified. Thank you. Laura Benoit R.N.

## 2022-06-21 NOTE — TELEPHONE ENCOUNTER
Patient is calling stating that he needs his omeprazole (PRILOSEC) 20 MG DR capsule RX resent to [facility] ([facility]) - [place], [place] - [address].  The only medication that he gets from [facility] is loratadine.  Thank you  Doretha Ashraf

## 2022-06-23 ENCOUNTER — LAB (OUTPATIENT)
Dept: LAB | Facility: CLINIC | Age: 77
End: 2022-06-23
Payer: MEDICARE

## 2022-06-23 ENCOUNTER — ANTICOAGULATION THERAPY VISIT (OUTPATIENT)
Dept: ANTICOAGULATION | Facility: CLINIC | Age: 77
End: 2022-06-23

## 2022-06-23 DIAGNOSIS — I48.0 PAROXYSMAL ATRIAL FIBRILLATION (H): ICD-10-CM

## 2022-06-23 DIAGNOSIS — I48.0 PAROXYSMAL ATRIAL FIBRILLATION (H): Primary | ICD-10-CM

## 2022-06-23 LAB — INR BLD: 2.1 (ref 0.9–1.1)

## 2022-06-23 PROCEDURE — 85610 PROTHROMBIN TIME: CPT

## 2022-06-23 PROCEDURE — 36415 COLL VENOUS BLD VENIPUNCTURE: CPT

## 2022-06-23 NOTE — PROGRESS NOTES
ANTICOAGULATION MANAGEMENT     Rian Ness 76 year old male is on warfarin with therapeutic INR result. (Goal INR 2.0-3.0)    Recent labs: (last 7 days)     06/23/22  0938   INR 2.1*       ASSESSMENT       Source(s): Chart Review    Previous INR was Therapeutic last 2(+) visits    Medication, diet, health changes since last INR chart reviewed; none identified           PLAN     Recommended plan for no diet, medication or health factor changes affecting INR     Dosing Instructions: continue your current warfarin dose with next INR in 4 weeks       Summary  As of 6/23/2022    Full warfarin instructions:  7.5 mg every Tue; 5 mg all other days   Next INR check:  7/21/2022             Detailed voice message left for Rian with dosing instructions and follow up date.     Contact [phone]  to schedule and with any changes, questions or concerns.     Education provided: Contact [phone]  with any changes, questions or concerns.     Plan made per [facility] anticoagulation protocol    Luda Grissom RN  Anticoagulation Clinic  6/23/2022    _______________________________________________________________________     Anticoagulation Episode Summary     Current INR goal:  2.0-3.0   TTR:  88.0 % (1 y)   Target end date:  Indefinite   Send INR reminders to:  [facility]    Indications    Paroxysmal atrial fibrillation (H) [I48.0]           Comments:  referral renewed 11/16/20         Anticoagulation Care Providers     Provider Role Specialty Phone number    Junie Golden MD Referring Family Medicine [phone]

## 2022-07-26 ENCOUNTER — ANTICOAGULATION THERAPY VISIT (OUTPATIENT)
Dept: ANTICOAGULATION | Facility: CLINIC | Age: 77
End: 2022-07-26

## 2022-07-26 ENCOUNTER — LAB (OUTPATIENT)
Dept: LAB | Facility: CLINIC | Age: 77
End: 2022-07-26
Payer: MEDICARE

## 2022-07-26 DIAGNOSIS — I48.0 PAROXYSMAL ATRIAL FIBRILLATION (H): Primary | ICD-10-CM

## 2022-07-26 DIAGNOSIS — I48.0 PAROXYSMAL ATRIAL FIBRILLATION (H): ICD-10-CM

## 2022-07-26 LAB — INR BLD: 1.9 (ref 0.9–1.1)

## 2022-07-26 PROCEDURE — 85610 PROTHROMBIN TIME: CPT

## 2022-07-26 PROCEDURE — 36416 COLLJ CAPILLARY BLOOD SPEC: CPT

## 2022-07-26 NOTE — PROGRESS NOTES
ANTICOAGULATION MANAGEMENT     Rian Ness 76 year old male is on warfarin with subtherapeutic INR result. (Goal INR 2.0-3.0)    Recent labs: (last 7 days)     07/26/22  0940   INR 1.9*       ASSESSMENT       Source(s): Chart Review and Patient/Caregiver Call       Warfarin doses taken: Warfarin taken as instructed    Diet: No new diet changes identified    New illness, injury, or hospitalization: No    Medication/supplement changes: None noted    Signs or symptoms of bleeding or clotting: No    Previous INR: Therapeutic last 2(+) visits    Additional findings: None       PLAN     Recommended plan for no diet, medication or health factor changes affecting INR     Dosing Instructions: Continue your current warfarin dose with next INR in 2 weeks       Summary  As of 7/26/2022    Full warfarin instructions:  7.5 mg every Tue; 5 mg all other days   Next INR check:               Telephone call with Rian who verbalizes understanding and agrees to plan    Patient offered & declined to schedule next visit    Education provided: Goal range and significance of current result    Plan made per [facility] anticoagulation protocol    Quita Ahmadi RN  Anticoagulation Clinic  7/26/2022    _______________________________________________________________________     Anticoagulation Episode Summary     Current INR goal:  2.0-3.0   TTR:  83.5 % (1 y)   Target end date:  Indefinite   Send INR reminders to:  [facility]    Indications    Paroxysmal atrial fibrillation (H) [I48.0]           Comments:  referral renewed 11/16/20         Anticoagulation Care Providers     Provider Role Specialty Phone number    Junie Golden MD Referring Family Medicine [phone]

## 2022-09-02 ENCOUNTER — LAB (OUTPATIENT)
Dept: LAB | Facility: CLINIC | Age: 77
End: 2022-09-02
Payer: MEDICARE

## 2022-09-02 ENCOUNTER — ANTICOAGULATION THERAPY VISIT (OUTPATIENT)
Dept: ANTICOAGULATION | Facility: CLINIC | Age: 77
End: 2022-09-02

## 2022-09-02 DIAGNOSIS — I48.0 PAROXYSMAL ATRIAL FIBRILLATION (H): Primary | ICD-10-CM

## 2022-09-02 DIAGNOSIS — I48.0 PAROXYSMAL ATRIAL FIBRILLATION (H): ICD-10-CM

## 2022-09-02 LAB — INR BLD: 2.9 (ref 0.9–1.1)

## 2022-09-02 PROCEDURE — 85610 PROTHROMBIN TIME: CPT

## 2022-09-02 PROCEDURE — 36416 COLLJ CAPILLARY BLOOD SPEC: CPT

## 2022-09-02 NOTE — PROGRESS NOTES
ANTICOAGULATION MANAGEMENT     Rian Ness 76 year old male is on warfarin with therapeutic INR result. (Goal INR 2.0-3.0)    Recent labs: (last 7 days)     09/02/22  1135   INR 2.9*       ASSESSMENT       Source(s): Chart Review and Patient/Caregiver Call       Warfarin doses taken: More warfarin taken than planned which may be affecting INR was taking 7.5mg on Tuesday AND Saturday.    Diet: No new diet changes identified    New illness, injury, or hospitalization: No    Medication/supplement changes: None noted    Signs or symptoms of bleeding or clotting: No    Previous INR: Subtherapeutic    Additional findings: None       PLAN     Recommended plan for ongoing change(s) affecting INR     Dosing Instructions: Continue your current warfarin dose with next INR in 2 weeks   Will continue the dosing Rian has been doing for the past several weeks, 7.5mg on Tues and Saturday and 5mg AOD and recheck in 2 weeks    Summary  As of 9/2/2022    Full warfarin instructions:  7.5 mg every Tue, Sat; 5 mg all other days   Next INR check:  9/23/2022             Telephone call with Rian who verbalizes understanding and agrees to plan    Lab visit scheduled    Education provided: Goal range and significance of current result, Importance of therapeutic range, Importance of following up at instructed interval, Importance of taking warfarin as instructed and Contact [phone]  with any changes, questions or concerns.     Plan made per [facility] anticoagulation protocol    Ana Laura Orozco RN  Anticoagulation Clinic  9/2/2022    _______________________________________________________________________     Anticoagulation Episode Summary     Current INR goal:  2.0-3.0   TTR:  82.4 % (1 y)   Target end date:  Indefinite   Send INR reminders to:  [facility]    Indications    Paroxysmal atrial fibrillation (H) [I48.0]           Comments:  referral renewed 11/16/20         Anticoagulation Care Providers     Provider Role Specialty  Phone number    Junie Golden MD [facility] [phone]

## 2022-09-09 NOTE — PROGRESS NOTES
"Rian Nses  :  1945  DOS: 2019  MRN: [id number]    Sports Medicine Clinic Visit    PCP: Junie Golden    Rian Ness is a 74 year old Right hand dominant male who is seen as an AIC patient presenting with acute right wrist pain and swelling.    Injury: Insidious onset of right wrist pain and swelling, that initially noted after mousing for 1 - 2 hours yesterday.  Pain located over right radial, dorsal wrist, nonradiating.  Additional Features:  Positive: swelling and weakness.  Symptoms are better with Rest.  Symptoms are worse with: mousing/typing, grasping, moving wrist, lifting.  Other evaluation and/or treatments so far consists of: No Treatment tried to date.  Recent imaging completed: No recent imaging completed.  Prior History of related problems: none    Social History: retired    Review of Systems  Musculoskeletal: as above  Remainder of review of systems is negative including constitutional, CV, pulmonary, GI, Skin and Neurologic except as noted in HPI or medical history.    Past Medical History:   Diagnosis Date     Arthritis      Cancer (H)      Depressive disorder      HTN      Past Surgical History:   Procedure Laterality Date     ABDOMEN SURGERY       ARTHROSCOPY KNEE RT/LT      right     ARTHROSCOPY KNEE RT/LT      left     C TOTAL KNEE ARTHROPLASTY  10/11    right knee     CATARACT IOL, RT/LT       COLONOSCOPY       PHACOEMULSIFICATION WITH STANDARD INTRAOCULAR LENS IMPLANT  2010; 2010    left eye; right eye     SUPRAPUBIC PROSTATECTOMY       VASCULAR SURGERY       Family History   Problem Relation Age of Onset     Cancer Mother         leukemia     Depression Other      Depression Other      Prostate Cancer Brother      Prostate Cancer Brother      Prostate Cancer Brother          Objective  /82   Ht 1.727 m (5' 8\")   Wt 95.7 kg (211 lb)   BMI 32.08 kg/m        General: healthy, alert and in no distress      HEENT: no scleral icterus or " Department of Orthopedic Surgery  Physician Assistant   Discharge Summary       Ignacio Thompson is a 37 y.o. female who underwent total hip replacement procedure without complication. Ignacio Thompson was admitted to the floor following their recovery in the PACU. Discharge Diagnosis  left Hip Replacement    Current Inpatient Medications    No current facility-administered medications for this encounter. Post-operatively the patients diet was advanced as tolerated and their incision was checked on POD #1 and #2. The incisional dressing remained in place, clean, dry, and intact with no signs of infection. The patient remained neurovascularly intact in the lower extremity and had intact pulses distally. Patients calf remained soft and showed no evidence of DVT. The patient was able to move their leg, ankle/foot without any problems post-operatively. Physical therapy and occupational therapy were consulted and began working with the patient post-operatively. The patient progressed with PT/OT as would be expected and continued to improve through their stay. The patients pain was initially controlled with PO medications but had to transition to IV pain medications in the afternoon of POD1. Control of her pain was finally achieved on POD2. From a medical standpoint the patient remained stable and continued to have the medicine team follow throughout their stay. The patient will be discharged at this time to Home  with their current diet restrictions and will continue to follow the hip precautions outlined to them by us and PT/OT. Condition on Discharge: Stable    Plan  Return visit in 2 weeks. .  Patient was instructed on the use of pain medications, the signs and symptoms of infection, and was given our number to call should they have any questions or concerns following discharge. For opioid prescriptions given at discharge the following statement is provided for compliance with [employer] rules. Patient being given increased dosage/quantity of opoid pain medication in excess of [employer] guidelines which noted a 30 MED daily of opioids due to the fact that he/she has undergone major orthopaedic surgery as outlined in rule 4731-11-13. Dosages and further duration of the pain medication will be re-evaluated at her post op visit in 2 weeks. Patient was educated on dosing expectations and limits of prescribing as a result of the new [facility] Board rules enacted August 31, 2017. Please also note that this is not the initial opoid prescription issued to this patient but a continuation of medication utilized during the hospital admission as noted in the medical record. [place] report has also been utilized to screen for any abuse history or suspicious activity as outlined in [place]. All efforts have been taken to prevent abuse potential and misuse of opioid medications including education, screening, and close clinical follow up.     Jodi Win, [address] conjunctival erythema     Skin: no suspicious lesions or rash. No jaundice.     CV: regular rhythm by palpation, 2+ distal pulses, no pedal edema      Resp: normal respiratory effort without conversational dyspnea     Psych: normal mood and affect      Gait: nonantalgic, appropriate coordination and balance     Neuro: normal light touch sensory exam of the extremities. Motor strength as noted below     Right Wrist and Hand exam    Inspection:       Swelling: over generalized wrist    Tender:       Broadly across the RC joint, and the dorsal wrist and finger extensors tendons more mildly    Non Tender:       Remainder of the Wrist and Hand right    ROM:       Decreased active and passive ROM of the right Wrist with flexion, extension, radial deviation and ulnar deviation     Strength:       Motor function intact    Neurovascular:       2+ radial pulses bilaterally with brisk capillary refill and      normal sensation to light touch in the radial, median and ulnar nerve distributions      Radiology:  XR images independently visualized and reviewed with patient today in clinic  No clear acute findings, some DJD present in 1st CMC and STT joints  Will follow final radiology read    Assessment:  1. Right wrist pain        Plan:  Discussed the assessment with the patient.  Follow up: 2 weeks if no improvement, sooner prn for worsening sx  Given acuity prefer to avoid injection today  Significant pain and swelling across the wrist joint, suspect mild OA flare  Cannot completely r/o gout, low suspicion for infection  Wrist brace provided which was much more comfortable  Ice therapy and antiinflammatory options reviewed  Consider add'l imaging or procedure, vs OT, based on clinical progress  If pain resolves he can cancel f/u  XR images independently visualized and reviewed with patient today in clinic  Home handouts provided and supportive care reviewed  All questions were answered today  Contact us with additional  questions or concerns  Signs and sx of concern reviewed      Eriberto Batista DO, CAQ  Primary Care Sports Medicine  [facility]                 Disclaimer: This note consists of symbols derived from keyboarding, dictation and/or voice recognition software. As a result, there may be errors in the script that have gone undetected. Please consider this when interpreting information found in this chart.

## 2022-09-16 ENCOUNTER — ANTICOAGULATION THERAPY VISIT (OUTPATIENT)
Dept: ANTICOAGULATION | Facility: CLINIC | Age: 77
End: 2022-09-16

## 2022-09-16 ENCOUNTER — LAB (OUTPATIENT)
Dept: LAB | Facility: CLINIC | Age: 77
End: 2022-09-16
Payer: MEDICARE

## 2022-09-16 DIAGNOSIS — I48.0 PAROXYSMAL ATRIAL FIBRILLATION (H): Primary | ICD-10-CM

## 2022-09-16 DIAGNOSIS — I48.0 PAROXYSMAL ATRIAL FIBRILLATION (H): ICD-10-CM

## 2022-09-16 LAB — INR BLD: 3.3 (ref 0.9–1.1)

## 2022-09-16 PROCEDURE — 85610 PROTHROMBIN TIME: CPT

## 2022-09-16 PROCEDURE — 36416 COLLJ CAPILLARY BLOOD SPEC: CPT

## 2022-09-16 NOTE — PROGRESS NOTES
ANTICOAGULATION MANAGEMENT     Rian Ness 76 year old male is on warfarin with supratherapeutic INR result. (Goal INR 2.0-3.0)    Recent labs: (last 7 days)     09/16/22  1119   INR 3.3*       ASSESSMENT       Source(s): Chart Review and Patient/Caregiver Call       Warfarin doses taken: Warfarin taken as instructed    Diet: No new diet changes identified    New illness, injury, or hospitalization: No    Medication/supplement changes: None noted    Signs or symptoms of bleeding or clotting: No    Previous INR: Therapeutic last visit; previously outside of goal range    Additional findings: None       PLAN     Recommended plan for no diet, medication or health factor changes affecting INR     Dosing Instructions: decrease your warfarin dose (6.2% change) with next INR in 2 weeks       Summary  As of 9/16/2022    Full warfarin instructions:  7.5 mg every Tue, Sat; 5 mg all other days   Next INR check:               Telephone call with Rian who verbalizes understanding and agrees to plan    Lab visit scheduled    Education provided: Goal range and significance of current result, Importance of therapeutic range, Importance of following up at instructed interval and Contact [phone]  with any changes, questions or concerns.     Plan made per [facility] anticoagulation protocol    Ana Laura Orozco RN  Anticoagulation Clinic  9/16/2022    _______________________________________________________________________     Anticoagulation Episode Summary     Current INR goal:  2.0-3.0   TTR:  79.6 % (1 y)   Target end date:  Indefinite   Send INR reminders to:  [facility]    Indications    Paroxysmal atrial fibrillation (H) [I48.0]           Comments:  referral renewed 11/16/20         Anticoagulation Care Providers     Provider Role Specialty Phone number    Junie Golden MD Referring Family Medicine [phone]

## 2022-09-30 ENCOUNTER — ANTICOAGULATION THERAPY VISIT (OUTPATIENT)
Dept: ANTICOAGULATION | Facility: CLINIC | Age: 77
End: 2022-09-30

## 2022-09-30 ENCOUNTER — LAB (OUTPATIENT)
Dept: LAB | Facility: CLINIC | Age: 77
End: 2022-09-30
Payer: MEDICARE

## 2022-09-30 DIAGNOSIS — I48.0 PAROXYSMAL ATRIAL FIBRILLATION (H): Primary | ICD-10-CM

## 2022-09-30 DIAGNOSIS — I48.0 PAROXYSMAL ATRIAL FIBRILLATION (H): ICD-10-CM

## 2022-09-30 LAB — INR BLD: 2.7 (ref 0.9–1.1)

## 2022-09-30 PROCEDURE — 85610 PROTHROMBIN TIME: CPT

## 2022-09-30 PROCEDURE — 36416 COLLJ CAPILLARY BLOOD SPEC: CPT

## 2022-09-30 NOTE — PROGRESS NOTES
ANTICOAGULATION MANAGEMENT     Rian ZELAYA Grover 76 year old male is on warfarin with therapeutic INR result. (Goal INR 2.0-3.0)    Recent labs: (last 7 days)     09/30/22  1115   INR 2.7*       ASSESSMENT       Source(s): Chart Review and Patient/Caregiver Call       Warfarin doses taken: Warfarin taken as instructed    Diet: No new diet changes identified    New illness, injury, or hospitalization: No    Medication/supplement changes: None noted    Signs or symptoms of bleeding or clotting: No    Previous INR: Supratherapeutic    Additional findings: None       PLAN     Recommended plan for no diet, medication or health factor changes affecting INR     Dosing Instructions: Continue your current warfarin dose with next INR in 3 weeks       Summary  As of 9/30/2022    Full warfarin instructions:  7.5 mg every Tue; 5 mg all other days   Next INR check:  10/21/2022             Telephone call with Rian who verbalizes understanding and agrees to plan    Lab visit scheduled    Education provided: Goal range and significance of current result, Importance of therapeutic range, Importance of following up at instructed interval and Contact [phone]  with any changes, questions or concerns.     Plan made per [facility] anticoagulation protocol    Ana Laura Orozco RN  Anticoagulation Clinic  9/30/2022    _______________________________________________________________________     Anticoagulation Episode Summary     Current INR goal:  2.0-3.0   TTR:  77.6 % (1 y)   Target end date:  Indefinite   Send INR reminders to:  [facility]    Indications    Paroxysmal atrial fibrillation (H) [I48.0]           Comments:  referral renewed 11/16/20         Anticoagulation Care Providers     Provider Role Specialty Phone number    Junie Golden MD Referring Family Medicine [phone]

## 2022-10-10 DIAGNOSIS — J30.2 SEASONAL ALLERGIC RHINITIS, UNSPECIFIED TRIGGER: ICD-10-CM

## 2022-10-11 RX ORDER — LORATADINE 10 MG/1
TABLET ORAL
Qty: 90 TABLET | Refills: 1 | Status: SHIPPED | OUTPATIENT
Start: 2022-10-11 | End: 2023-04-14

## 2022-10-21 ENCOUNTER — LAB (OUTPATIENT)
Dept: LAB | Facility: CLINIC | Age: 77
End: 2022-10-21
Payer: MEDICARE

## 2022-10-21 ENCOUNTER — ANTICOAGULATION THERAPY VISIT (OUTPATIENT)
Dept: ANTICOAGULATION | Facility: CLINIC | Age: 77
End: 2022-10-21

## 2022-10-21 DIAGNOSIS — I48.0 PAROXYSMAL ATRIAL FIBRILLATION (H): ICD-10-CM

## 2022-10-21 DIAGNOSIS — I48.0 PAROXYSMAL ATRIAL FIBRILLATION (H): Primary | ICD-10-CM

## 2022-10-21 LAB — INR BLD: 1.7 (ref 0.9–1.1)

## 2022-10-21 PROCEDURE — 85610 PROTHROMBIN TIME: CPT

## 2022-10-21 PROCEDURE — 36416 COLLJ CAPILLARY BLOOD SPEC: CPT

## 2022-10-21 NOTE — PROGRESS NOTES
ANTICOAGULATION MANAGEMENT     Rian Ness 76 year old male is on warfarin with subtherapeutic INR result. (Goal INR 2.0-3.0)    Recent labs: (last 7 days)     10/21/22  0943   INR 1.7*       ASSESSMENT       Source(s): Chart Review and Patient/Caregiver Call       Warfarin doses taken: Warfarin taken as instructed    Diet: Increased greens/vitamin K in diet; plans to resume previous intake    New illness, injury, or hospitalization: No    Medication/supplement changes: None noted    Signs or symptoms of bleeding or clotting: No    Previous INR: Therapeutic last visit; previously outside of goal range    Additional findings: None       PLAN     Recommended plan for temporary change(s) affecting INR     Dosing Instructions: booster dose then continue your current warfarin dose with next INR in 2 weeks       Summary  As of 10/21/2022    Full warfarin instructions:  10/21: 7.5 mg; Otherwise 7.5 mg every Tue; 5 mg all other days; Starting 10/21/2022   Next INR check:  11/4/2022             Telephone call with Rian who verbalizes understanding and agrees to plan    Lab visit scheduled    Education provided:     Please call back if any changes to your diet, medications or how you've been taking warfarin    Symptom monitoring: monitoring for clotting signs and symptoms    Plan made per [facility] anticoagulation protocol    Rosa Maria Weathers RN  Anticoagulation Clinic  10/21/2022    _______________________________________________________________________     Anticoagulation Episode Summary     Current INR goal:  2.0-3.0   TTR:  75.9 % (1 y)   Target end date:  Indefinite   Send INR reminders to:  [facility]    Indications    Paroxysmal atrial fibrillation (H) [I48.0]           Comments:  referral renewed 11/16/20         Anticoagulation Care Providers     Provider Role Specialty Phone number    Junie Golden MD Referring Family Medicine [phone]

## 2022-11-01 ENCOUNTER — DOCUMENTATION ONLY (OUTPATIENT)
Dept: ANTICOAGULATION | Facility: CLINIC | Age: 77
End: 2022-11-01

## 2022-11-01 DIAGNOSIS — I48.0 PAROXYSMAL ATRIAL FIBRILLATION (H): Primary | ICD-10-CM

## 2022-11-01 NOTE — PROGRESS NOTES
ANTICOAGULATION CLINIC REFERRAL RENEWAL REQUEST       An annual renewal order is required for all patients referred to [facility].?  Please review and sign the pended referral order for Rian Ness.       ANTICOAGULATION SUMMARY      Warfarin indication(s)   Atrial Fibrillation    Mechanical heart valve present?  NO       Current goal range   INR: 2.0-3.0     Goal appropriate for indication? Goal INR 2-3, standard for indication(s) above     Time in Therapeutic Range (TTR)  (Goal > 60%) 75%       Office visit with referring provider's group within last year yes on 6/16/22       Luda Grissom RN  [facility]

## 2022-11-04 ENCOUNTER — LAB (OUTPATIENT)
Dept: LAB | Facility: CLINIC | Age: 77
End: 2022-11-04
Payer: MEDICARE

## 2022-11-04 ENCOUNTER — ANTICOAGULATION THERAPY VISIT (OUTPATIENT)
Dept: ANTICOAGULATION | Facility: CLINIC | Age: 77
End: 2022-11-04

## 2022-11-04 DIAGNOSIS — I48.0 PAROXYSMAL ATRIAL FIBRILLATION (H): ICD-10-CM

## 2022-11-04 DIAGNOSIS — I48.0 PAROXYSMAL ATRIAL FIBRILLATION (H): Primary | ICD-10-CM

## 2022-11-04 LAB — INR BLD: 1.8 (ref 0.9–1.1)

## 2022-11-04 PROCEDURE — 85610 PROTHROMBIN TIME: CPT

## 2022-11-04 PROCEDURE — 36415 COLL VENOUS BLD VENIPUNCTURE: CPT

## 2022-11-04 NOTE — PROGRESS NOTES
ANTICOAGULATION MANAGEMENT     Rian Ness 77 year old male is on warfarin with subtherapeutic INR result. (Goal INR 2.0-3.0)    Recent labs: (last 7 days)     11/04/22  1011   INR 1.8*       ASSESSMENT       Source(s): Chart Review and Patient/Caregiver Call       Warfarin doses taken: Warfarin taken as instructed    Diet: No new diet changes identified    New illness, injury, or hospitalization: No    Medication/supplement changes: None noted    Signs or symptoms of bleeding or clotting: No    Previous INR: Subtherapeutic    Additional findings: None       PLAN     Recommended plan for no diet, medication or health factor changes affecting INR     Dosing Instructions: Continue your current warfarin dose with next INR in 2 weeks       Summary  As of 11/4/2022    Full warfarin instructions:  7.5 mg every Tue, Fri; 5 mg all other days; Starting 11/4/2022   Next INR check:  11/18/2022             Telephone call with Rian who verbalizes understanding and agrees to plan    Lab visit scheduled    Education provided:     Please call back if any changes to your diet, medications or how you've been taking warfarin    Plan made per [facility] anticoagulation protocol    Rosa Maria Weathers RN  Anticoagulation Clinic  11/4/2022    _______________________________________________________________________     Anticoagulation Episode Summary     Current INR goal:  2.0-3.0   TTR:  72.1 % (1 y)   Target end date:  Indefinite   Send INR reminders to:  [facility]    Indications    Paroxysmal atrial fibrillation (H) [I48.0]           Comments:  referral renewed 11/16/20         Anticoagulation Care Providers     Provider Role Specialty Phone number    Junie Golden MD Referring Family Medicine [phone]

## 2022-11-18 ENCOUNTER — LAB (OUTPATIENT)
Dept: LAB | Facility: CLINIC | Age: 77
End: 2022-11-18
Payer: MEDICARE

## 2022-11-18 ENCOUNTER — ANTICOAGULATION THERAPY VISIT (OUTPATIENT)
Dept: ANTICOAGULATION | Facility: CLINIC | Age: 77
End: 2022-11-18

## 2022-11-18 DIAGNOSIS — I48.0 PAROXYSMAL ATRIAL FIBRILLATION (H): ICD-10-CM

## 2022-11-18 DIAGNOSIS — I48.0 PAROXYSMAL ATRIAL FIBRILLATION (H): Primary | ICD-10-CM

## 2022-11-18 LAB — INR BLD: 2.5 (ref 0.9–1.1)

## 2022-11-18 PROCEDURE — 36416 COLLJ CAPILLARY BLOOD SPEC: CPT

## 2022-11-18 PROCEDURE — 85610 PROTHROMBIN TIME: CPT

## 2022-11-18 NOTE — PROGRESS NOTES
ANTICOAGULATION MANAGEMENT     Rian Ness 77 year old male is on warfarin with therapeutic INR result. (Goal INR 2.0-3.0)    Recent labs: (last 7 days)     11/18/22  1040   INR 2.5*       ASSESSMENT       Source(s): Chart Review and Patient/Caregiver Call       Warfarin doses taken: Warfarin taken as instructed    Diet: No new diet changes identified    New illness, injury, or hospitalization: No    Medication/supplement changes: None noted    Signs or symptoms of bleeding or clotting: No    Previous INR: Subtherapeutic    Additional findings: None       PLAN     Recommended plan for no diet, medication or health factor changes affecting INR     Dosing Instructions: Continue your current warfarin dose with next INR in 3 weeks       Summary  As of 11/18/2022    Full warfarin instructions:  7.5 mg every Tue, Fri; 5 mg all other days; Starting 11/18/2022   Next INR check:  12/9/2022             Telephone call with Rian who verbalizes understanding and agrees to plan    Lab visit scheduled    Education provided:     Please call back if any changes to your diet, medications or how you've been taking warfarin    Taking warfarin: importance of following [facility] instructions vs instructions on the prescription bottle    Plan made per [facility] anticoagulation protocol    Ana Laura Orozco RN  Anticoagulation Clinic  11/18/2022    _______________________________________________________________________     Anticoagulation Episode Summary     Current INR goal:  2.0-3.0   TTR:  71.0 % (1 y)   Target end date:  Indefinite   Send INR reminders to:  [facility]    Indications    Paroxysmal atrial fibrillation (H) [I48.0]           Comments:  referral renewed 11/16/20         Anticoagulation Care Providers     Provider Role Specialty Phone number    Junie Golden MD Referring Family Medicine [phone]

## 2022-12-09 ENCOUNTER — ANTICOAGULATION THERAPY VISIT (OUTPATIENT)
Dept: ANTICOAGULATION | Facility: CLINIC | Age: 77
End: 2022-12-09

## 2022-12-09 ENCOUNTER — LAB (OUTPATIENT)
Dept: LAB | Facility: CLINIC | Age: 77
End: 2022-12-09
Payer: MEDICARE

## 2022-12-09 DIAGNOSIS — I48.0 PAROXYSMAL ATRIAL FIBRILLATION (H): ICD-10-CM

## 2022-12-09 DIAGNOSIS — I48.0 PAROXYSMAL ATRIAL FIBRILLATION (H): Primary | ICD-10-CM

## 2022-12-09 LAB — INR BLD: 3.1 (ref 0.9–1.1)

## 2022-12-09 PROCEDURE — 85610 PROTHROMBIN TIME: CPT

## 2022-12-09 PROCEDURE — 36416 COLLJ CAPILLARY BLOOD SPEC: CPT

## 2022-12-09 NOTE — PROGRESS NOTES
ANTICOAGULATION MANAGEMENT     Rian Ness 77 year old male is on warfarin with supratherapeutic INR result. (Goal INR 2.0-3.0)    Recent labs: (last 7 days)     12/09/22  1020   INR 3.1*       ASSESSMENT       Source(s): Chart Review and Patient/Caregiver Call       Warfarin doses taken: Warfarin taken as instructed    Diet: No new diet changes identified    New illness, injury, or hospitalization: No    Medication/supplement changes: None noted    Signs or symptoms of bleeding or clotting: No    Previous INR: Therapeutic last visit; previously outside of goal range    Additional findings: None       PLAN     Recommended plan for no diet, medication or health factor changes affecting INR     Dosing Instructions: Continue your current warfarin dose with next INR in 2 weeks       Summary  As of 12/9/2022    Full warfarin instructions:  7.5 mg every Tue, Fri; 5 mg all other days; Starting 12/9/2022   Next INR check:  12/23/2022             Telephone call with Rian who verbalizes understanding and agrees to plan    Lab visit scheduled    Education provided:     Please call back if any changes to your diet, medications or how you've been taking warfarin    Goal range and lab monitoring: goal range and significance of current result, Importance of therapeutic range and Importance of following up at instructed interval    Plan made per [facility] anticoagulation protocol    Ana Laura Orozco RN  Anticoagulation Clinic  12/9/2022    _______________________________________________________________________     Anticoagulation Episode Summary     Current INR goal:  2.0-3.0   TTR:  70.0 % (1 y)   Target end date:  Indefinite   Send INR reminders to:  [facility]    Indications    Paroxysmal atrial fibrillation (H) [I48.0]           Comments:  referral renewed 11/16/20         Anticoagulation Care Providers     Provider Role Specialty Phone number    Junie Golden MD Referring Family Medicine [phone]

## 2022-12-23 ENCOUNTER — ANTICOAGULATION THERAPY VISIT (OUTPATIENT)
Dept: ANTICOAGULATION | Facility: CLINIC | Age: 77
End: 2022-12-23

## 2022-12-23 ENCOUNTER — LAB (OUTPATIENT)
Dept: LAB | Facility: CLINIC | Age: 77
End: 2022-12-23
Payer: MEDICARE

## 2022-12-23 DIAGNOSIS — I48.0 PAROXYSMAL ATRIAL FIBRILLATION (H): Primary | ICD-10-CM

## 2022-12-23 DIAGNOSIS — I48.0 PAROXYSMAL ATRIAL FIBRILLATION (H): ICD-10-CM

## 2022-12-23 LAB — INR BLD: 2.3 (ref 0.9–1.1)

## 2022-12-23 PROCEDURE — 36416 COLLJ CAPILLARY BLOOD SPEC: CPT

## 2022-12-23 PROCEDURE — 85610 PROTHROMBIN TIME: CPT

## 2022-12-23 NOTE — PROGRESS NOTES
ANTICOAGULATION MANAGEMENT     Rian Ness 77 year old male is on warfarin with therapeutic INR result. (Goal INR 2.0-3.0)    Recent labs: (last 7 days)     12/23/22  1013   INR 2.3*       ASSESSMENT       Source(s): Chart Review and Patient/Caregiver Call       Warfarin doses taken: Warfarin taken as instructed    Diet: No new diet changes identified    New illness, injury, or hospitalization: No    Medication/supplement changes: None noted    Signs or symptoms of bleeding or clotting: No    Previous INR: Supratherapeutic    Additional findings: None       PLAN     Recommended plan for no diet, medication or health factor changes affecting INR     Dosing Instructions: Continue your current warfarin dose with next INR in 3 weeks       Summary  As of 12/23/2022    Full warfarin instructions:  7.5 mg every Tue, Fri; 5 mg all other days   Next INR check:  1/13/2023             Telephone call with Rian who verbalizes understanding and agrees to plan    Lab visit scheduled    Education provided:     Please call back if any changes to your diet, medications or how you've been taking warfarin    Contact [phone]  with any changes, questions or concerns.     Plan made per [facility] anticoagulation protocol    Derrick ZELAYA RN  Anticoagulation Clinic  12/23/2022    _______________________________________________________________________     Anticoagulation Episode Summary     Current INR goal:  2.0-3.0   TTR:  69.6 % (1 y)   Target end date:  Indefinite   Send INR reminders to:  [facility]    Indications    Paroxysmal atrial fibrillation (H) [I48.0]           Comments:  referral renewed 11/16/20         Anticoagulation Care Providers     Provider Role Specialty Phone number    Junie Golden MD Referring Family Medicine [phone]

## 2023-01-04 DIAGNOSIS — I48.0 PAF (PAROXYSMAL ATRIAL FIBRILLATION) (H): ICD-10-CM

## 2023-01-05 RX ORDER — WARFARIN SODIUM 5 MG/1
TABLET ORAL
Qty: 100 TABLET | Refills: 1 | Status: SHIPPED | OUTPATIENT
Start: 2023-01-05 | End: 2023-10-09

## 2023-01-08 ENCOUNTER — HEALTH MAINTENANCE LETTER (OUTPATIENT)
Age: 78
End: 2023-01-08

## 2023-01-13 ENCOUNTER — ANTICOAGULATION THERAPY VISIT (OUTPATIENT)
Dept: ANTICOAGULATION | Facility: CLINIC | Age: 78
End: 2023-01-13

## 2023-01-13 ENCOUNTER — LAB (OUTPATIENT)
Dept: LAB | Facility: CLINIC | Age: 78
End: 2023-01-13
Payer: MEDICARE

## 2023-01-13 DIAGNOSIS — I48.0 PAROXYSMAL ATRIAL FIBRILLATION (H): Primary | ICD-10-CM

## 2023-01-13 DIAGNOSIS — I48.0 PAROXYSMAL ATRIAL FIBRILLATION (H): ICD-10-CM

## 2023-01-13 LAB — INR BLD: 2.6 (ref 0.9–1.1)

## 2023-01-13 PROCEDURE — 36416 COLLJ CAPILLARY BLOOD SPEC: CPT

## 2023-01-13 PROCEDURE — 85610 PROTHROMBIN TIME: CPT

## 2023-01-13 NOTE — PROGRESS NOTES
ANTICOAGULATION MANAGEMENT     Rain Ness 77 year old male is on warfarin with therapeutic INR result. (Goal INR 2.0-3.0)    Recent labs: (last 7 days)     01/13/23  1013   INR 2.6*       ASSESSMENT       Source(s): Chart Review    Previous INR was Therapeutic last visit; previously outside of goal range    Medication, diet, health changes since last INR chart reviewed; none identified           PLAN     Recommended plan for no diet, medication or health factor changes affecting INR     Dosing Instructions: Continue your current warfarin dose with next INR in 4 weeks       Summary  As of 1/13/2023    Full warfarin instructions:  7.5 mg every Tue, Fri; 5 mg all other days   Next INR check:  2/10/2023             Detailed voice message left for Rian with dosing instructions and follow up date.     Contact [phone]  to schedule and with any changes, questions or concerns.     Education provided:     Please call back if any changes to your diet, medications or how you've been taking warfarin    Goal range and lab monitoring: goal range and significance of current result, Importance of therapeutic range and Importance of following up at instructed interval    Plan made per [facility] anticoagulation protocol    Ana Laura Orozco RN  Anticoagulation Clinic  1/13/2023    _______________________________________________________________________     Anticoagulation Episode Summary     Current INR goal:  2.0-3.0   TTR:  69.6 % (1 y)   Target end date:  Indefinite   Send INR reminders to:  [facility]    Indications    Paroxysmal atrial fibrillation (H) [I48.0]           Comments:  referral renewed 11/16/20         Anticoagulation Care Providers     Provider Role Specialty Phone number    Junie Golden MD Referring Family Medicine [phone]

## 2023-01-30 ENCOUNTER — ANCILLARY PROCEDURE (OUTPATIENT)
Dept: GENERAL RADIOLOGY | Facility: CLINIC | Age: 78
End: 2023-01-30
Attending: FAMILY MEDICINE
Payer: MEDICARE

## 2023-01-30 ENCOUNTER — OFFICE VISIT (OUTPATIENT)
Dept: FAMILY MEDICINE | Facility: CLINIC | Age: 78
End: 2023-01-30
Payer: MEDICARE

## 2023-01-30 VITALS
OXYGEN SATURATION: 96 % | BODY MASS INDEX: 33.56 KG/M2 | TEMPERATURE: 97.5 F | DIASTOLIC BLOOD PRESSURE: 83 MMHG | HEART RATE: 95 BPM | SYSTOLIC BLOOD PRESSURE: 138 MMHG | RESPIRATION RATE: 22 BRPM | HEIGHT: 67 IN | WEIGHT: 213.8 LBS

## 2023-01-30 DIAGNOSIS — I10 HYPERTENSION GOAL BP (BLOOD PRESSURE) < 140/90: Primary | ICD-10-CM

## 2023-01-30 DIAGNOSIS — C61 MALIGNANT NEOPLASM PROSTATE (H): ICD-10-CM

## 2023-01-30 DIAGNOSIS — M54.2 NECK PAIN: ICD-10-CM

## 2023-01-30 DIAGNOSIS — G89.29 CHRONIC BILATERAL LOW BACK PAIN WITH RIGHT-SIDED SCIATICA: ICD-10-CM

## 2023-01-30 DIAGNOSIS — I48.19 PERSISTENT ATRIAL FIBRILLATION (H): ICD-10-CM

## 2023-01-30 DIAGNOSIS — M54.41 CHRONIC BILATERAL LOW BACK PAIN WITH RIGHT-SIDED SCIATICA: ICD-10-CM

## 2023-01-30 PROCEDURE — 72100 X-RAY EXAM L-S SPINE 2/3 VWS: CPT | Mod: TC | Performed by: RADIOLOGY

## 2023-01-30 PROCEDURE — 72040 X-RAY EXAM NECK SPINE 2-3 VW: CPT | Mod: TC | Performed by: RADIOLOGY

## 2023-01-30 PROCEDURE — 99214 OFFICE O/P EST MOD 30 MIN: CPT | Performed by: FAMILY MEDICINE

## 2023-01-30 ASSESSMENT — PAIN SCALES - GENERAL: PAINLEVEL: MILD PAIN (2)

## 2023-01-30 NOTE — PROGRESS NOTES
"  Assessment & Plan     (I10) Hypertension goal BP (blood pressure) < 140/90  (primary encounter diagnosis)  Comment: to goal  Plan: due for labs  Continue current medications    (I48.19) Persistent atrial fibrillation (H)  Comment: stable, rate controlled  Plan: continue anticoagulation    (C61) Malignant neoplasm prostate (H)  Comment: no sing of recurrence  Plan: follows with urology    (M54.41,  G89.29) Chronic bilateral low back pain with right-sided sciatica  (M54.2) Neck pain  Comment: suspect disc degeneration and arthritic disease  Plan: XR Lumbar Spine 2/3 Views        XR Cervical Spine 2/3 Views        Wide spread djd, consider referral for injections.                BMI:   Estimated body mass index is 33.99 kg/m  as calculated from the following:    Height as of this encounter: 1.689 m (5' 6.5\").    Weight as of this encounter: 97 kg (213 lb 12.8 oz).   Weight management plan: Discussed healthy diet and exercise guidelines    See Patient Instructions    Return in about 5 months (around 6/30/2023) for Wellness Exam.    Junie Golden MD  [facility]   Rian is a 77 year old, presenting for the following health issues:  Referral      History of Present Illness       Back Pain:  He presents for follow up of back pain. Patient's back pain is a recurring problem.  Location of back pain:  Right lower back, left lower back and right buttock  Description of back pain: other  Back pain spreads: right buttocks    Since patient first noticed back pain, pain is: always present, but gets better and worse  Does back pain interfere with his job:  Not applicable      Headaches:   Since the patient's last clinic visit, headaches are: worsened  The patient is getting headaches:  Almost constant  He is able to do normal daily activities when he has a migraine.  The patient is taking the following rescue/relief medications:  Tylenol   Patient states \"I get only a small amount of " "relief\" from the rescue/relief medications.   The patient is taking the following medications to prevent migraines:  No medications to prevent migraines  In the past 4 weeks, the patient has gone to an Urgent Care or Emergency Room 0 times times due to headaches.    He eats 0-1 servings of fruits and vegetables daily.He consumes 1 sweetened beverage(s) daily.He exercises with enough effort to increase his heart rate 10 to 19 minutes per day.    He is taking medications regularly.     No weakness of legs, no incontinence.  Suspect ha due to neck pain.  Coming up on 1 year anniversary of wife's death.            Review of Systems   Constitutional, HEENT, cardiovascular, pulmonary, gi and gu systems are negative, except as otherwise noted.      Objective    /83   Pulse 95   Temp 97.5  F (36.4  C) (Oral)   Resp 22   Ht 1.689 m (5' 6.5\")   Wt 97 kg (213 lb 12.8 oz)   SpO2 96%   BMI 33.99 kg/m    Body mass index is 33.99 kg/m .  Physical Exam   GENERAL: alert, no distress and elderly  EYES: Eyes grossly normal to inspection, PERRL and conjunctivae and sclerae normal  RESP: lungs clear to auscultation - no rales, rhonchi or wheezes  CV: irregularly irregular rhythm, normal S1 S2, no S3 or S4, no murmur, click or rub, peripheral pulses strong and no peripheral edema  MS: no gross musculoskeletal defects noted, no edema  SKIN: no suspicious lesions or rashes  PSYCH: mentation appears normal and affect flat    Xrays: LS spine: moderate facet arthritic changes, + degenerative disc disease   C spine - normal alignment. + facet arthritic change, disc height loss  personally reviewed during visit, await radiology review                 "

## 2023-02-06 ENCOUNTER — OFFICE VISIT (OUTPATIENT)
Dept: OPHTHALMOLOGY | Facility: CLINIC | Age: 78
End: 2023-02-06
Payer: MEDICARE

## 2023-02-06 DIAGNOSIS — Z96.1 PSEUDOPHAKIA: Primary | ICD-10-CM

## 2023-02-06 DIAGNOSIS — H43.813 POSTERIOR VITREOUS DETACHMENT, BILATERAL: ICD-10-CM

## 2023-02-06 DIAGNOSIS — H52.4 PRESBYOPIA: ICD-10-CM

## 2023-02-06 DIAGNOSIS — H26.492 POSTERIOR CAPSULAR OPACIFICATION VISUALLY SIGNIFICANT, LEFT EYE: ICD-10-CM

## 2023-02-06 DIAGNOSIS — Z01.01 ENCOUNTER FOR EXAMINATION OF EYES AND VISION WITH ABNORMAL FINDINGS: ICD-10-CM

## 2023-02-06 PROCEDURE — 92014 COMPRE OPH EXAM EST PT 1/>: CPT | Mod: 57 | Performed by: OPHTHALMOLOGY

## 2023-02-06 PROCEDURE — 92015 DETERMINE REFRACTIVE STATE: CPT | Mod: GY | Performed by: OPHTHALMOLOGY

## 2023-02-06 PROCEDURE — 66821 AFTER CATARACT LASER SURGERY: CPT | Mod: LT | Performed by: OPHTHALMOLOGY

## 2023-02-06 ASSESSMENT — VISUAL ACUITY
OD_CC: 20/25
METHOD: SNELLEN - LINEAR
OD_CC+: -2
CORRECTION_TYPE: GLASSES
OD_CC: J1+
OS_CC: 20/25
OS_CC: J1+

## 2023-02-06 ASSESSMENT — CONF VISUAL FIELD
OD_SUPERIOR_NASAL_RESTRICTION: 0
OS_SUPERIOR_NASAL_RESTRICTION: 0
OD_INFERIOR_TEMPORAL_RESTRICTION: 0
OS_INFERIOR_NASAL_RESTRICTION: 0
OD_INFERIOR_NASAL_RESTRICTION: 0
OS_INFERIOR_TEMPORAL_RESTRICTION: 0
OS_SUPERIOR_TEMPORAL_RESTRICTION: 0
OD_SUPERIOR_TEMPORAL_RESTRICTION: 0
METHOD: COUNTING FINGERS
OD_NORMAL: 1
OS_NORMAL: 1

## 2023-02-06 ASSESSMENT — REFRACTION_MANIFEST
OS_SPHERE: -1.50
OD_SPHERE: -0.75
OD_AXIS: 008
OS_ADD: +2.50
OD_CYLINDER: +1.75
OD_ADD: +2.50
OS_CYLINDER: +2.00
OS_AXIS: 175

## 2023-02-06 ASSESSMENT — REFRACTION_WEARINGRX
OD_SPHERE: -1.00
OS_AXIS: 175
OS_CYLINDER: +2.00
OS_ADD: +2.50
OD_ADD: +2.50
SPECS_TYPE: BIFOCAL
OD_AXIS: 005
OD_CYLINDER: +1.75
OS_SPHERE: -1.50

## 2023-02-06 ASSESSMENT — TONOMETRY
OS_IOP_MMHG: 18
IOP_METHOD: APPLANATION
OD_IOP_MMHG: 16

## 2023-02-06 ASSESSMENT — EXTERNAL EXAM - RIGHT EYE: OD_EXAM: 2+ BROW PTOSIS, PROLAPSED FAT PADS: UPPER, LOWER

## 2023-02-06 ASSESSMENT — SLIT LAMP EXAM - LIDS
COMMENTS: 2+ DERMATOCHALASIS - UPPER LID
COMMENTS: 2+ DERMATOCHALASIS - UPPER LID

## 2023-02-06 ASSESSMENT — CUP TO DISC RATIO
OS_RATIO: 0.4
OD_RATIO: 0.2

## 2023-02-06 ASSESSMENT — EXTERNAL EXAM - LEFT EYE: OS_EXAM: 2+ BROW PTOSIS, PROLAPSED FAT PADS: UPPER, LOWER

## 2023-02-06 NOTE — PATIENT INSTRUCTIONS
Your eye may be slightly red, sore, and blurry for a few days.  You may notice some floaters for a few days.  Keep scheduled return visit for a intraocular pressure check and refraction in about one week.    Jared Abebe M.D.  [phone]

## 2023-02-06 NOTE — LETTER
2/6/2023         RE: Rian Ness  [address] [facility] [address]        Dear Colleague,    Thank you for referring your patient, Rian Ness, to the [facility]. Please see a copy of my visit note below.     Current Eye Medications:  Artificial tears - both eyes PRN      Subjective:  Comprehensive eye exam - wondering if he has to update his glasses this year. No pain or discomfort either eye.      Objective:  See Ophthalmology Exam.       Assessment:  Visually significant posterior capsule opacity left eye.  Otherwise stable eye exam.      ICD-10-CM    1. Pseudophakia,ou  Z96.1       2. Posterior capsular opacification visually significant, left eye  H26.492       3. Posterior vitreous detachment, bilateral  H43.813       4. Encounter for examination of eyes and vision with abnormal findings  Z01.01       5. Presbyopia  H52.4            Plan:  Yag Capsulotomy performed without problems left eye under Proparacaine anesthetic.  Well tolerated.  Number of applications: 18  Power of applications: 1.5 mJ  Iopidine given.    Jared Abebe M.D.                  Again, thank you for allowing me to participate in the care of your patient.        Sincerely,        Jared Abebe MD

## 2023-02-06 NOTE — PROGRESS NOTES
Current Eye Medications:  Artificial tears - both eyes PRN      Subjective:  Comprehensive eye exam - wondering if he has to update his glasses this year. No pain or discomfort either eye.      Objective:  See Ophthalmology Exam.       Assessment:  Visually significant posterior capsule opacity left eye.  Otherwise stable eye exam.      ICD-10-CM    1. Pseudophakia,ou  Z96.1       2. Posterior capsular opacification visually significant, left eye  H26.492       3. Posterior vitreous detachment, bilateral  H43.813       4. Encounter for examination of eyes and vision with abnormal findings  Z01.01       5. Presbyopia  H52.4            Plan:  Yag Capsulotomy performed without problems left eye under Proparacaine anesthetic.  Well tolerated.  Number of applications: 18  Power of applications: 1.5 mJ  Iopidine given.    Jared Abebe M.D.

## 2023-02-10 ENCOUNTER — ANTICOAGULATION THERAPY VISIT (OUTPATIENT)
Dept: ANTICOAGULATION | Facility: CLINIC | Age: 78
End: 2023-02-10

## 2023-02-10 ENCOUNTER — LAB (OUTPATIENT)
Dept: LAB | Facility: CLINIC | Age: 78
End: 2023-02-10
Payer: MEDICARE

## 2023-02-10 DIAGNOSIS — I48.0 PAROXYSMAL ATRIAL FIBRILLATION (H): ICD-10-CM

## 2023-02-10 DIAGNOSIS — I48.0 PAROXYSMAL ATRIAL FIBRILLATION (H): Primary | ICD-10-CM

## 2023-02-10 LAB — INR BLD: 2.8 (ref 0.9–1.1)

## 2023-02-10 PROCEDURE — 36416 COLLJ CAPILLARY BLOOD SPEC: CPT

## 2023-02-10 PROCEDURE — 85610 PROTHROMBIN TIME: CPT

## 2023-02-10 NOTE — PROGRESS NOTES
ANTICOAGULATION MANAGEMENT     Rian Ness 77 year old male is on warfarin with therapeutic INR result. (Goal INR 2.0-3.0)    Recent labs: (last 7 days)     02/10/23  1018   INR 2.8*       ASSESSMENT       Source(s): Chart Review and Patient/Caregiver Call       Warfarin doses taken: Warfarin taken as instructed    Diet: No new diet changes identified    New illness, injury, or hospitalization: No    Medication/supplement changes: None noted    Signs or symptoms of bleeding or clotting: No    Previous INR: Therapeutic last 2(+) visits    Additional findings: None       PLAN     Recommended plan for no diet, medication or health factor changes affecting INR     Dosing Instructions: Continue your current warfarin dose with next INR in 4 weeks       Summary  As of 2/10/2023    Full warfarin instructions:  7.5 mg every Tue, Fri; 5 mg all other days   Next INR check:  3/10/2023             Telephone call with Rian who verbalizes understanding and agrees to plan    Lab visit scheduled    Education provided:     Please call back if any changes to your diet, medications or how you've been taking warfarin    Plan made per [facility] anticoagulation protocol    Rosa Maria Weathers RN  Anticoagulation Clinic  2/10/2023    _______________________________________________________________________     Anticoagulation Episode Summary     Current INR goal:  2.0-3.0   TTR:  69.6 % (1 y)   Target end date:  Indefinite   Send INR reminders to:  [facility]    Indications    Paroxysmal atrial fibrillation (H) [I48.0]           Comments:  referral renewed 11/16/20         Anticoagulation Care Providers     Provider Role Specialty Phone number    Junie Golden MD Referring Family Medicine [phone]

## 2023-02-13 ENCOUNTER — OFFICE VISIT (OUTPATIENT)
Dept: OPHTHALMOLOGY | Facility: CLINIC | Age: 78
End: 2023-02-13
Payer: MEDICARE

## 2023-02-13 DIAGNOSIS — Z96.1 PSEUDOPHAKIA: Primary | ICD-10-CM

## 2023-02-13 PROCEDURE — 99024 POSTOP FOLLOW-UP VISIT: CPT | Performed by: OPHTHALMOLOGY

## 2023-02-13 ASSESSMENT — REFRACTION_WEARINGRX
SPECS_TYPE: BIFOCAL
OD_AXIS: 005
OS_AXIS: 175
OD_SPHERE: -1.00
OS_ADD: +2.50
OD_ADD: +2.50
OD_CYLINDER: +1.75
OS_SPHERE: -1.50
OS_CYLINDER: +2.00

## 2023-02-13 ASSESSMENT — VISUAL ACUITY
OS_CC: 20/20
METHOD: SNELLEN - LINEAR
CORRECTION_TYPE: GLASSES
OD_CC+: -2
OD_CC: 20/20

## 2023-02-13 ASSESSMENT — TONOMETRY
OS_IOP_MMHG: 17
IOP_METHOD: APPLANATION
OD_IOP_MMHG: 15

## 2023-02-13 ASSESSMENT — REFRACTION_MANIFEST
OS_CYLINDER: +1.75
OS_AXIS: 177
OS_SPHERE: -1.75

## 2023-02-13 ASSESSMENT — SLIT LAMP EXAM - LIDS
COMMENTS: 2+ DERMATOCHALASIS - UPPER LID
COMMENTS: 2+ DERMATOCHALASIS - UPPER LID

## 2023-02-13 ASSESSMENT — EXTERNAL EXAM - RIGHT EYE: OD_EXAM: 2+ BROW PTOSIS, PROLAPSED FAT PADS: UPPER, LOWER

## 2023-02-13 ASSESSMENT — EXTERNAL EXAM - LEFT EYE: OS_EXAM: 2+ BROW PTOSIS, PROLAPSED FAT PADS: UPPER, LOWER

## 2023-02-13 NOTE — PROGRESS NOTES
"Current Eye Medications:  Artificial tears - both eyes PRN     Subjective:  S/p YAG Cap left eye 2/6/23 - vision left eye clearer.    Objective:  See Ophthalmology Exam.      Assessment:  Doing well s/p Yag Caps left eye.      Plan:  Glasses prescription given - optional  May use artificial tears up to four times a day (like Refresh Optive, Systane Balance, TheraTears, or generic artificial tears are ok. Avoid \"get the red out\" drops).   Possible clouding of posterior capsule right eye discussed.    Call in October 2023 for an appointment in February 2024 for Complete Exam    Dr. Abebe [phone]           "

## 2023-02-13 NOTE — PATIENT INSTRUCTIONS
"Glasses prescription given - optional  May use artificial tears up to four times a day (like Refresh Optive, Systane Balance, TheraTears, or generic artificial tears are ok. Avoid \"get the red out\" drops).   Possible clouding of posterior capsule right eye discussed.    Call in October 2023 for an appointment in February 2024 for Complete Exam    Dr. Abebe [phone]    "

## 2023-02-13 NOTE — LETTER
"    2/13/2023         RE: Rian Ness  [address] [address] [facility] [address]        Dear Colleague,    Thank you for referring your patient, Rian Ness, to the [facility]. Please see a copy of my visit note below.    Current Eye Medications:  Artificial tears - both eyes PRN     Subjective:  S/p YAG Cap left eye 2/6/23 - vision left eye clearer.    Objective:  See Ophthalmology Exam.      Assessment:  Doing well s/p Yag Caps left eye.      Plan:  Glasses prescription given - optional  May use artificial tears up to four times a day (like Refresh Optive, Systane Balance, TheraTears, or generic artificial tears are ok. Avoid \"get the red out\" drops).   Possible clouding of posterior capsule right eye discussed.    Call in October 2023 for an appointment in February 2024 for Complete Exam    Dr. Abebe [phone]               Again, thank you for allowing me to participate in the care of your patient.        Sincerely,        Jared Abebe MD    "
Follow up with Dr Cna in 14 days. Call office for appointment. Take medications as prescribed. Take antibiotics as directed by Dr Can and primary medical doctor. Keep dressing clean, dry, and intact. Rest, ice, and elevate affected extremity. Non-weight bearing left upper extremity.

## 2023-03-10 ENCOUNTER — ANTICOAGULATION THERAPY VISIT (OUTPATIENT)
Dept: ANTICOAGULATION | Facility: CLINIC | Age: 78
End: 2023-03-10

## 2023-03-10 ENCOUNTER — LAB (OUTPATIENT)
Dept: LAB | Facility: CLINIC | Age: 78
End: 2023-03-10
Payer: MEDICARE

## 2023-03-10 DIAGNOSIS — I48.0 PAROXYSMAL ATRIAL FIBRILLATION (H): Primary | ICD-10-CM

## 2023-03-10 DIAGNOSIS — I48.0 PAROXYSMAL ATRIAL FIBRILLATION (H): ICD-10-CM

## 2023-03-10 LAB — INR BLD: 2.9 (ref 0.9–1.1)

## 2023-03-10 PROCEDURE — 85610 PROTHROMBIN TIME: CPT

## 2023-03-10 PROCEDURE — 36415 COLL VENOUS BLD VENIPUNCTURE: CPT

## 2023-03-10 NOTE — PROGRESS NOTES
ANTICOAGULATION MANAGEMENT     Rianerick Ness 77 year old male is on warfarin with therapeutic INR result. (Goal INR 2.0-3.0)    Recent labs: (last 7 days)     03/10/23  0934   INR 2.9*       ASSESSMENT       Source(s): Chart Review and Patient/Caregiver Call       Warfarin doses taken: Warfarin taken as instructed    Diet: No new diet changes identified    New illness, injury, or hospitalization: No    Medication/supplement changes: None noted    Signs or symptoms of bleeding or clotting: No    Previous INR: Therapeutic last 2(+) visits    Additional findings: None         PLAN     Recommended plan for no diet, medication or health factor changes affecting INR     Dosing Instructions: Continue your current warfarin dose with next INR in 5 weeks       Summary  As of 3/10/2023    Full warfarin instructions:  7.5 mg every Tue, Fri; 5 mg all other days   Next INR check:  4/14/2023             Telephone call with Rian who verbalizes understanding and agrees to plan    Lab visit scheduled    Education provided:     Please call back if any changes to your diet, medications or how you've been taking warfarin    Contact [phone]  with any changes, questions or concerns.     Plan made per [facility]agulation protocol    Derrick ZELAYA RN  Anticoagulation Clinic  3/10/2023    _______________________________________________________________________     Anticoagulation Episode Summary     Current INR goal:  2.0-3.0   TTR:  71.9 % (1 y)   Target end date:  Indefinite   Send INR reminders to:  [facility]    Indications    Paroxysmal atrial fibrillation (H) [I48.0]           Comments:  referral renewed 11/16/20         Anticoagulation Care Providers     Provider Role Specialty Phone number    Junie Golden MD Referring Family Medicine [phone]

## 2023-04-13 DIAGNOSIS — J30.2 SEASONAL ALLERGIC RHINITIS, UNSPECIFIED TRIGGER: ICD-10-CM

## 2023-04-14 ENCOUNTER — LAB (OUTPATIENT)
Dept: LAB | Facility: CLINIC | Age: 78
End: 2023-04-14
Payer: MEDICARE

## 2023-04-14 ENCOUNTER — ANTICOAGULATION THERAPY VISIT (OUTPATIENT)
Dept: ANTICOAGULATION | Facility: CLINIC | Age: 78
End: 2023-04-14

## 2023-04-14 DIAGNOSIS — I48.0 PAROXYSMAL ATRIAL FIBRILLATION (H): Primary | ICD-10-CM

## 2023-04-14 DIAGNOSIS — I48.0 PAROXYSMAL ATRIAL FIBRILLATION (H): ICD-10-CM

## 2023-04-14 LAB — INR BLD: 2.6 (ref 0.9–1.1)

## 2023-04-14 PROCEDURE — 36416 COLLJ CAPILLARY BLOOD SPEC: CPT

## 2023-04-14 PROCEDURE — 85610 PROTHROMBIN TIME: CPT

## 2023-04-14 RX ORDER — LORATADINE 10 MG/1
TABLET ORAL
Qty: 90 TABLET | Refills: 1 | Status: SHIPPED | OUTPATIENT
Start: 2023-04-14 | End: 2023-10-09

## 2023-04-14 NOTE — PROGRESS NOTES
ANTICOAGULATION MANAGEMENT     Rian Ness 77 year old male is on warfarin with therapeutic INR result. (Goal INR 2.0-3.0)    Recent labs: (last 7 days)     04/14/23  0943   INR 2.6*       ASSESSMENT       Source(s): Chart Review and Patient/Caregiver Call       Warfarin doses taken: Warfarin taken as instructed    Diet: No new diet changes identified    Medication/supplement changes: None noted    New illness, injury, or hospitalization: No    Signs or symptoms of bleeding or clotting: No    Previous INR: Therapeutic last 2(+) visits    Additional findings: None         PLAN     Recommended plan for no diet, medication or health factor changes affecting INR     Dosing Instructions: Continue your current warfarin dose with next INR in 6 weeks       Summary  As of 4/14/2023    Full warfarin instructions:  7.5 mg every Tue, Fri; 5 mg all other days   Next INR check:  5/26/2023             Telephone call with Rian who verbalizes understanding and agrees to plan    Lab visit scheduled    Education provided:     Goal range and lab monitoring: goal range and significance of current result    Plan made per [facility]agulation protocol    Quita Ahmadi RN  Anticoagulation Clinic  4/14/2023    _______________________________________________________________________     Anticoagulation Episode Summary     Current INR goal:  2.0-3.0   TTR:  77.8 % (1 y)   Target end date:  Indefinite   Send INR reminders to:  [facility]    Indications    Paroxysmal atrial fibrillation (H) [I48.0]           Comments:           Anticoagulation Care Providers     Provider Role Specialty Phone number    Junie Golden MD Referring Family Medicine [phone]

## 2023-05-17 ENCOUNTER — PATIENT OUTREACH (OUTPATIENT)
Dept: CARE COORDINATION | Facility: CLINIC | Age: 78
End: 2023-05-17
Payer: MEDICARE

## 2023-05-26 ENCOUNTER — ANTICOAGULATION THERAPY VISIT (OUTPATIENT)
Dept: ANTICOAGULATION | Facility: CLINIC | Age: 78
End: 2023-05-26

## 2023-05-26 ENCOUNTER — LAB (OUTPATIENT)
Dept: LAB | Facility: CLINIC | Age: 78
End: 2023-05-26
Payer: MEDICARE

## 2023-05-26 DIAGNOSIS — I48.0 PAROXYSMAL ATRIAL FIBRILLATION (H): ICD-10-CM

## 2023-05-26 DIAGNOSIS — I48.0 PAROXYSMAL ATRIAL FIBRILLATION (H): Primary | ICD-10-CM

## 2023-05-26 LAB — INR BLD: 1.7 (ref 0.9–1.1)

## 2023-05-26 PROCEDURE — 85610 PROTHROMBIN TIME: CPT

## 2023-05-26 PROCEDURE — 36416 COLLJ CAPILLARY BLOOD SPEC: CPT

## 2023-05-26 NOTE — PROGRESS NOTES
ANTICOAGULATION MANAGEMENT     Rian Ness 77 year old male is on warfarin with subtherapeutic INR result. (Goal INR 2.0-3.0)    Recent labs: (last 7 days)     05/26/23  0933   INR 1.7*       ASSESSMENT       Source(s): Chart Review and Patient/Caregiver Call       Warfarin doses taken: Missed dose(s) may be affecting INR    Diet: No new diet changes identified    Medication/supplement changes: None noted    New illness, injury, or hospitalization: No    Signs or symptoms of bleeding or clotting: No    Previous result: Therapeutic last 2(+) visits    Additional findings: Patient missed one dose. He is congested and his allergies are acting up so he fell asleep w/o taking his pill.         PLAN     Recommended plan for temporary change(s) affecting INR     Dosing Instructions: booster dose then continue your current warfarin dose with next INR in 2 weeks       Summary  As of 5/26/2023    Full warfarin instructions:  5/26: 10 mg; Otherwise 7.5 mg every Tue, Fri; 5 mg all other days   Next INR check:  6/9/2023             Telephone call with Rian who verbalizes understanding and agrees to plan    Lab visit scheduled    Education provided:     Please call back if any changes to your diet, medications or how you've been taking warfarin    Symptom monitoring: monitoring for clotting signs and symptoms, monitoring for stroke signs and symptoms and when to seek medical attention/emergency care    Contact [phone]  with any changes, questions or concerns.     Plan made per [facility]agulation protocol    Derrick CEE RN  Anticoagulation Clinic  5/26/2023    _______________________________________________________________________     Anticoagulation Episode Summary     Current INR goal:  2.0-3.0   TTR:  77.7 % (1 y)   Target end date:  Indefinite   Send INR reminders to:  [facility]    Indications    Paroxysmal atrial fibrillation (H) [I48.0]           Comments:           Anticoagulation Care Providers      Provider Role Specialty Phone number    Junie Golden MD Referring Family Medicine [phone]

## 2023-06-01 ENCOUNTER — PATIENT OUTREACH (OUTPATIENT)
Dept: CARE COORDINATION | Facility: CLINIC | Age: 78
End: 2023-06-01
Payer: MEDICARE

## 2023-06-09 ENCOUNTER — ANTICOAGULATION THERAPY VISIT (OUTPATIENT)
Dept: ANTICOAGULATION | Facility: CLINIC | Age: 78
End: 2023-06-09

## 2023-06-09 ENCOUNTER — LAB (OUTPATIENT)
Dept: LAB | Facility: CLINIC | Age: 78
End: 2023-06-09
Payer: MEDICARE

## 2023-06-09 DIAGNOSIS — I48.0 PAROXYSMAL ATRIAL FIBRILLATION (H): Primary | ICD-10-CM

## 2023-06-09 DIAGNOSIS — I48.0 PAROXYSMAL ATRIAL FIBRILLATION (H): ICD-10-CM

## 2023-06-09 LAB — INR BLD: 3.2 (ref 0.9–1.1)

## 2023-06-09 PROCEDURE — 36416 COLLJ CAPILLARY BLOOD SPEC: CPT

## 2023-06-09 PROCEDURE — 85610 PROTHROMBIN TIME: CPT

## 2023-06-09 NOTE — PROGRESS NOTES
ANTICOAGULATION MANAGEMENT     Rian Ness 77 year old male is on warfarin with supratherapeutic INR result. (Goal INR 2.0-3.0)    Recent labs: (last 7 days)     06/09/23  1036   INR 3.2*       ASSESSMENT       Source(s): Chart Review and Patient/Caregiver Call       Warfarin doses taken: Warfarin taken differently, but did not change total weekly dose    Diet: No new diet changes identified    Medication/supplement changes: his dentist prescribed him amoxicillin, he will finish it tomorrow    New illness, injury, or hospitalization: Yes: had a tooth pulled last week, he is seeing improvement following antibiotics    Signs or symptoms of bleeding or clotting: No    Previous result: Subtherapeutic    Additional findings: None         PLAN     Recommended plan for temporary change(s) affecting INR     Dosing Instructions: partial hold then continue your current warfarin dose with next INR in 1-2 weeks       Summary  As of 6/9/2023    Full warfarin instructions:  6/10: 5 mg; Otherwise 7.5 mg every Tue, Sat; 5 mg all other days   Next INR check:  6/23/2023             Telephone call with Rain who verbalizes understanding and agrees to plan    Lab visit scheduled    Education provided:     Goal range and lab monitoring: goal range and significance of current result    Interaction IS anticipated between warfarin and antibiotics    Symptom monitoring: monitoring for bleeding signs and symptoms    Importance of notifying anticoagulation clinic for: changes in medications; a sooner lab recheck maybe needed    Contact [phone]  with any changes, questions or concerns.     Plan made per [facility]agulation protocol    Ying Mendez RN  Anticoagulation Clinic  6/9/2023    _______________________________________________________________________     Anticoagulation Episode Summary     Current INR goal:  2.0-3.0   TTR:  76.4 % (1 y)   Target end date:  Indefinite   Send INR reminders to:  [facility]     Indications    Paroxysmal atrial fibrillation (H) [I48.0]           Comments:           Anticoagulation Care Providers     Provider Role Specialty Phone number    Junie Golden MD Referring Family Medicine [phone]

## 2023-06-22 ENCOUNTER — ANTICOAGULATION THERAPY VISIT (OUTPATIENT)
Dept: ANTICOAGULATION | Facility: CLINIC | Age: 78
End: 2023-06-22

## 2023-06-22 ENCOUNTER — LAB (OUTPATIENT)
Dept: LAB | Facility: CLINIC | Age: 78
End: 2023-06-22
Payer: MEDICARE

## 2023-06-22 DIAGNOSIS — I48.0 PAROXYSMAL ATRIAL FIBRILLATION (H): ICD-10-CM

## 2023-06-22 DIAGNOSIS — I48.0 PAROXYSMAL ATRIAL FIBRILLATION (H): Primary | ICD-10-CM

## 2023-06-22 LAB — INR BLD: 4.1 (ref 0.9–1.1)

## 2023-06-22 PROCEDURE — 85610 PROTHROMBIN TIME: CPT

## 2023-06-22 PROCEDURE — 36416 COLLJ CAPILLARY BLOOD SPEC: CPT

## 2023-06-22 NOTE — PROGRESS NOTES
ANTICOAGULATION MANAGEMENT     Rian Ness 77 year old male is on warfarin with supratherapeutic INR result. (Goal INR 2.0-3.0)    Recent labs: (last 7 days)     06/22/23  1121   INR 4.1*       ASSESSMENT       Source(s): Chart Review and Patient/Caregiver Call       Warfarin doses taken: Warfarin taken as instructed    Diet: No new diet changes identified    Medication/supplement changes: None noted    New illness, injury, or hospitalization: Pt states his allergies have been acting up the past week, he did a bunch of weeding yesterday that has been impacting them as well.    Signs or symptoms of bleeding or clotting: No    Previous result: Supratherapeutic    Additional findings: None         PLAN     Recommended plan for temporary change(s) affecting INR     Dosing Instructions: hold dose then continue your current warfarin dose with next INR in 1 week       Summary  As of 6/22/2023    Full warfarin instructions:  6/22: Hold; Otherwise 7.5 mg every Tue, Sat; 5 mg all other days   Next INR check:  6/29/2023             Telephone call with Rian who verbalizes understanding and agrees to plan    Lab visit scheduled    Education provided:     Goal range and lab monitoring: goal range and significance of current result and Importance of therapeutic range    Potential impact on INR from inflammation/increased allergy symptoms.     Plan made per [facility]agulation protocol    Dave Alva RN  Anticoagulation Clinic  6/22/2023    _______________________________________________________________________     Anticoagulation Episode Summary     Current INR goal:  2.0-3.0   TTR:  72.9 % (1 y)   Target end date:  Indefinite   Send INR reminders to:  [facility]    Indications    Paroxysmal atrial fibrillation (H) [I48.0]           Comments:           Anticoagulation Care Providers     Provider Role Specialty Phone number    Junie Golden MD Referring Family Medicine [phone]

## 2023-06-29 ENCOUNTER — LAB (OUTPATIENT)
Dept: LAB | Facility: CLINIC | Age: 78
End: 2023-06-29
Payer: MEDICARE

## 2023-06-29 ENCOUNTER — ANTICOAGULATION THERAPY VISIT (OUTPATIENT)
Dept: ANTICOAGULATION | Facility: CLINIC | Age: 78
End: 2023-06-29

## 2023-06-29 DIAGNOSIS — I48.0 PAROXYSMAL ATRIAL FIBRILLATION (H): ICD-10-CM

## 2023-06-29 DIAGNOSIS — I48.0 PAROXYSMAL ATRIAL FIBRILLATION (H): Primary | ICD-10-CM

## 2023-06-29 LAB — INR BLD: 2.6 (ref 0.9–1.1)

## 2023-06-29 PROCEDURE — 85610 PROTHROMBIN TIME: CPT

## 2023-06-29 PROCEDURE — 36416 COLLJ CAPILLARY BLOOD SPEC: CPT

## 2023-06-29 NOTE — PROGRESS NOTES
ANTICOAGULATION MANAGEMENT     Rian Ness 77 year old male is on warfarin with therapeutic INR result. (Goal INR 2.0-3.0)    Recent labs: (last 7 days)     06/29/23  1128   INR 2.6*       ASSESSMENT       Source(s): Chart Review and Patient/Caregiver Call       Warfarin doses taken: Warfarin taken as instructed    Diet: No new diet changes identified    Medication/supplement changes: None noted     New illness, injury, or hospitalization: No- allergies still bothersome    Signs or symptoms of bleeding or clotting: No    Previous result: Supratherapeutic    Additional findings: None         PLAN     Recommended plan for no diet, medication or health factor changes affecting INR     Dosing Instructions: Continue your current warfarin dose with next INR in 2 weeks       Summary  As of 6/29/2023    Full warfarin instructions:  7.5 mg every Tue, Sat; 5 mg all other days   Next INR check:  7/13/2023             Telephone call with Rian who verbalizes understanding and agrees to plan and who agrees to plan and repeated back plan correctly    Lab visit scheduled    Education provided:     None required    Plan made per [facility] anticoagulation protocol    Kina Kelly RN  Anticoagulation Clinic  6/29/2023    _______________________________________________________________________     Anticoagulation Episode Summary     Current INR goal:  2.0-3.0   TTR:  71.5 % (1 y)   Target end date:  Indefinite   Send INR reminders to:  [facility]    Indications    Paroxysmal atrial fibrillation (H) [I48.0]           Comments:           Anticoagulation Care Providers     Provider Role Specialty Phone number    Junie Golden MD Referring Family Medicine [phone]

## 2023-07-07 DIAGNOSIS — I48.0 PAF (PAROXYSMAL ATRIAL FIBRILLATION) (H): ICD-10-CM

## 2023-07-07 DIAGNOSIS — K21.9 GASTROESOPHAGEAL REFLUX DISEASE WITHOUT ESOPHAGITIS: ICD-10-CM

## 2023-07-12 NOTE — TELEPHONE ENCOUNTER
metoprolol succinate ER (TOPROL-XL) 100 MG 24 hr tablet      Last Written Prescription Date:  4/22/2022  Last Fill Quantity: 90,   # refills: 3  Last Office Visit : 5/4/2021  [place]  Future Office visit:  none    Routing refill request to provider for review/approval because:  Failed medication protocol: appointment  - 24 months last visit  - plan last visit 5/4/2021: Return to clinic as needed.  - patient need to be seen for refills or defer to PCP?

## 2023-07-13 ENCOUNTER — ANTICOAGULATION THERAPY VISIT (OUTPATIENT)
Dept: ANTICOAGULATION | Facility: CLINIC | Age: 78
End: 2023-07-13

## 2023-07-13 ENCOUNTER — LAB (OUTPATIENT)
Dept: LAB | Facility: CLINIC | Age: 78
End: 2023-07-13
Payer: MEDICARE

## 2023-07-13 DIAGNOSIS — I48.0 PAROXYSMAL ATRIAL FIBRILLATION (H): Primary | ICD-10-CM

## 2023-07-13 DIAGNOSIS — I48.0 PAROXYSMAL ATRIAL FIBRILLATION (H): ICD-10-CM

## 2023-07-13 LAB — INR BLD: 3.9 (ref 0.9–1.1)

## 2023-07-13 PROCEDURE — 36416 COLLJ CAPILLARY BLOOD SPEC: CPT

## 2023-07-13 PROCEDURE — 85610 PROTHROMBIN TIME: CPT

## 2023-07-13 NOTE — PROGRESS NOTES
ANTICOAGULATION MANAGEMENT     Rian Ness 77 year old male is on warfarin with supratherapeutic INR result. (Goal INR 2.0-3.0)    Recent labs: (last 7 days)     07/13/23  1130   INR 3.9*       ASSESSMENT       Source(s): Chart Review and Patient/Caregiver Call       Warfarin doses taken: Warfarin taken as instructed    Diet: No new diet changes identified    Medication/supplement changes: Pt has been taking some OTC Nasocort as needed with per micromedex, can increased risk of bleeding or diminished effects of warfarin.    New illness, injury, or hospitalization: No - stil complains of allergy symptoms may make an appt with PCP to discuss    Signs or symptoms of bleeding or clotting: No    Previous result: Therapeutic last visit; previously outside of goal range    Additional findings: None         PLAN     Recommended plan for temporary change(s) affecting INR     Dosing Instructions: hold dose then continue your current warfarin dose with next INR in 2 weeks       Summary  As of 7/13/2023    Full warfarin instructions:  7/13: Hold; Otherwise 7.5 mg every Tue, Sat; 5 mg all other days   Next INR check:  7/27/2023             Telephone call with Rian who verbalizes understanding and agrees to plan and who agrees to plan and repeated back plan correctly    Lab visit scheduled    Education provided:     Goal range and lab monitoring: goal range and significance of current result and Importance of therapeutic range    Interaction IS anticipated between warfarin and Nasocort    Contact [phone]  with any changes, questions or concerns.     Plan made per [facility] anticoagulation protocol    Salome Castellon RN  Anticoagulation Clinic  7/13/2023    _______________________________________________________________________     Anticoagulation Episode Summary     Current INR goal:  2.0-3.0   TTR:  69.7 % (1 y)   Target end date:  Indefinite   Send INR reminders to:  [facility]    Indications    Paroxysmal atrial  fibrillation (H) [I48.0]           Comments:           Anticoagulation Care Providers     Provider Role Specialty Phone number    Junie Golden MD Referring Family Medicine [phone]

## 2023-07-13 NOTE — TELEPHONE ENCOUNTER
Defer to PCP.    Luda Griffith, RN  Cardiology Care Coordinator  [facility]  [phone] option 1

## 2023-07-14 RX ORDER — METOPROLOL SUCCINATE 100 MG/1
100 TABLET, EXTENDED RELEASE ORAL DAILY
Qty: 90 TABLET | Refills: 0 | Status: SHIPPED | OUTPATIENT
Start: 2023-07-14 | End: 2023-12-13

## 2023-07-16 ENCOUNTER — HEALTH MAINTENANCE LETTER (OUTPATIENT)
Age: 78
End: 2023-07-16

## 2023-07-27 ENCOUNTER — ANTICOAGULATION THERAPY VISIT (OUTPATIENT)
Dept: ANTICOAGULATION | Facility: CLINIC | Age: 78
End: 2023-07-27

## 2023-07-27 ENCOUNTER — LAB (OUTPATIENT)
Dept: LAB | Facility: CLINIC | Age: 78
End: 2023-07-27
Payer: MEDICARE

## 2023-07-27 DIAGNOSIS — I48.0 PAROXYSMAL ATRIAL FIBRILLATION (H): ICD-10-CM

## 2023-07-27 DIAGNOSIS — I48.0 PAROXYSMAL ATRIAL FIBRILLATION (H): Primary | ICD-10-CM

## 2023-07-27 LAB — INR BLD: 2.9 (ref 0.9–1.1)

## 2023-07-27 PROCEDURE — 85610 PROTHROMBIN TIME: CPT

## 2023-07-27 PROCEDURE — 36416 COLLJ CAPILLARY BLOOD SPEC: CPT

## 2023-07-27 NOTE — PROGRESS NOTES
ANTICOAGULATION MANAGEMENT     Rian Ness 77 year old male is on warfarin with therapeutic INR result. (Goal INR 2.0-3.0)    Recent labs: (last 7 days)     07/27/23  1411   INR 2.9*       ASSESSMENT     Source(s): Chart Review and Patient/Caregiver Call     Warfarin doses taken: Warfarin taken as instructed  Diet: No new diet changes identified  Medication/supplement changes: None noted  New illness, injury, or hospitalization: No  Signs or symptoms of bleeding or clotting: No  Previous result: Supratherapeutic  Additional findings: None       PLAN     Recommended plan for no diet, medication or health factor changes affecting INR     Dosing Instructions: Continue your current warfarin dose with next INR in 3 weeks       Summary  As of 7/27/2023      Full warfarin instructions:  7.5 mg every Tue, Sat; 5 mg all other days   Next INR check:  8/17/2023               Telephone call with Rian who verbalizes understanding and agrees to plan and who agrees to plan and repeated back plan correctly    Lab visit scheduled    Education provided:   Please call back if any changes to your diet, medications or how you've been taking warfarin    Plan made per [facility] anticoagulation protocol    Salome Castellon, RN  Anticoagulation Clinic  7/27/2023    _______________________________________________________________________     Anticoagulation Episode Summary       Current INR goal:  2.0-3.0   TTR:  70.0 % (1 y)   Target end date:  Indefinite   Send INR reminders to:  [facility]    Indications    Paroxysmal atrial fibrillation (H) [I48.0]             Comments:               Anticoagulation Care Providers       Provider Role Specialty Phone number    Junie Golden MD Referring Family Medicine [phone]

## 2023-08-17 ENCOUNTER — ANTICOAGULATION THERAPY VISIT (OUTPATIENT)
Dept: ANTICOAGULATION | Facility: CLINIC | Age: 78
End: 2023-08-17

## 2023-08-17 ENCOUNTER — LAB (OUTPATIENT)
Dept: LAB | Facility: CLINIC | Age: 78
End: 2023-08-17
Payer: MEDICARE

## 2023-08-17 DIAGNOSIS — I48.0 PAROXYSMAL ATRIAL FIBRILLATION (H): Primary | ICD-10-CM

## 2023-08-17 DIAGNOSIS — I48.0 PAROXYSMAL ATRIAL FIBRILLATION (H): ICD-10-CM

## 2023-08-17 LAB — INR BLD: 2.6 (ref 0.9–1.1)

## 2023-08-17 PROCEDURE — 85610 PROTHROMBIN TIME: CPT

## 2023-08-17 PROCEDURE — 36416 COLLJ CAPILLARY BLOOD SPEC: CPT

## 2023-08-17 NOTE — PROGRESS NOTES
ANTICOAGULATION MANAGEMENT     Rian Ness 77 year old male is on warfarin with therapeutic INR result. (Goal INR 2.0-3.0)    Recent labs: (last 7 days)     08/17/23  0924   INR 2.6*       ASSESSMENT     Source(s): Chart Review and Patient/Caregiver Call     Warfarin doses taken: Warfarin taken as instructed  Diet: No new diet changes identified  Medication/supplement changes: None noted  New illness, injury, or hospitalization: No  Signs or symptoms of bleeding or clotting: No  Previous result: Therapeutic last visit; previously outside of goal range  Additional findings: None       PLAN     Recommended plan for no diet, medication or health factor changes affecting INR     Dosing Instructions: Continue your current warfarin dose with next INR in 4 weeks       Summary  As of 8/17/2023      Full warfarin instructions:  7.5 mg every Tue, Sat; 5 mg all other days   Next INR check:  9/14/2023               Telephone call with Rian who verbalizes understanding and agrees to plan and who agrees to plan and repeated back plan correctly    Lab visit scheduled    Education provided:   None required    Plan made per [facility] anticoagulation protocol    Kina Kelly RN  Anticoagulation Clinic  8/17/2023    _______________________________________________________________________     Anticoagulation Episode Summary       Current INR goal:  2.0-3.0   TTR:  70.9 % (1 y)   Target end date:  Indefinite   Send INR reminders to:  [facility]    Indications    Paroxysmal atrial fibrillation (H) [I48.0]             Comments:               Anticoagulation Care Providers       Provider Role Specialty Phone number    Junie Golden MD Referring Family Medicine [phone]

## 2023-09-14 ENCOUNTER — LAB (OUTPATIENT)
Dept: LAB | Facility: CLINIC | Age: 78
End: 2023-09-14
Payer: MEDICARE

## 2023-09-14 ENCOUNTER — ANTICOAGULATION THERAPY VISIT (OUTPATIENT)
Dept: ANTICOAGULATION | Facility: CLINIC | Age: 78
End: 2023-09-14

## 2023-09-14 DIAGNOSIS — I48.0 PAROXYSMAL ATRIAL FIBRILLATION (H): Primary | ICD-10-CM

## 2023-09-14 DIAGNOSIS — I48.0 PAROXYSMAL ATRIAL FIBRILLATION (H): ICD-10-CM

## 2023-09-14 LAB — INR BLD: 2.3 (ref 0.9–1.1)

## 2023-09-14 PROCEDURE — 36416 COLLJ CAPILLARY BLOOD SPEC: CPT

## 2023-09-14 PROCEDURE — 85610 PROTHROMBIN TIME: CPT

## 2023-09-14 NOTE — PROGRESS NOTES
ANTICOAGULATION MANAGEMENT     Rian Ness 77 year old male is on warfarin with therapeutic INR result. (Goal INR 2.0-3.0)    Recent labs: (last 7 days)     09/14/23  0933   INR 2.3*       ASSESSMENT     Source(s): Chart Review and Patient/Caregiver Call     Warfarin doses taken: Warfarin taken as instructed  Diet: No new diet changes identified  Medication/supplement changes: None noted  New illness, injury, or hospitalization: No  Signs or symptoms of bleeding or clotting: No  Previous result: Therapeutic last 2(+) visits  Additional findings: None       PLAN     Recommended plan for no diet, medication or health factor changes affecting INR     Dosing Instructions: Continue your current warfarin dose with next INR in 4 weeks       Summary  As of 9/14/2023      Full warfarin instructions:  7.5 mg every Tue, Sat; 5 mg all other days   Next INR check:  10/12/2023               Telephone call with Rian who verbalizes understanding and agrees to plan and who agrees to plan and repeated back plan correctly    Lab visit scheduled    Education provided:   Contact [phone]  with any changes, questions or concerns.     Plan made per [facility]agulation protocol    Salome Castellon RN  Anticoagulation Clinic  9/14/2023    _______________________________________________________________________     Anticoagulation Episode Summary       Current INR goal:  2.0-3.0   TTR:  73.1 % (1 y)   Target end date:  Indefinite   Send INR reminders to:  [facility]    Indications    Paroxysmal atrial fibrillation (H) [I48.0]             Comments:               Anticoagulation Care Providers       Provider Role Specialty Phone number    Junie Golden MD Referring Family Medicine [phone]

## 2023-10-09 DIAGNOSIS — R73.09 ELEVATED GLUCOSE: ICD-10-CM

## 2023-10-09 DIAGNOSIS — Z13.6 CARDIOVASCULAR SCREENING; LDL GOAL LESS THAN 130: ICD-10-CM

## 2023-10-09 DIAGNOSIS — I48.0 PAF (PAROXYSMAL ATRIAL FIBRILLATION) (H): ICD-10-CM

## 2023-10-09 DIAGNOSIS — I10 HYPERTENSION GOAL BP (BLOOD PRESSURE) < 140/90: Primary | ICD-10-CM

## 2023-10-09 DIAGNOSIS — J30.2 SEASONAL ALLERGIC RHINITIS, UNSPECIFIED TRIGGER: ICD-10-CM

## 2023-10-09 DIAGNOSIS — Z12.5 SCREENING PSA (PROSTATE SPECIFIC ANTIGEN): ICD-10-CM

## 2023-10-09 RX ORDER — WARFARIN SODIUM 5 MG/1
TABLET ORAL
Qty: 100 TABLET | Refills: 10 | Status: SHIPPED | OUTPATIENT
Start: 2023-10-09

## 2023-10-09 RX ORDER — LORATADINE 10 MG/1
TABLET ORAL
Qty: 90 TABLET | Refills: 0 | Status: SHIPPED | OUTPATIENT
Start: 2023-10-09 | End: 2023-12-13

## 2023-10-09 NOTE — TELEPHONE ENCOUNTER
Please mail letter    Dawit Modi,    I received a refill request for loratidine.  I have sent a 90 day supply to your pharmacy.  You are due for your wellness exam; please call now as those types of appointments are already booking into November.    You will need labs prior to your visit so please schedule that appointment at least 3 days prior to your appointment with me.

## 2023-10-09 NOTE — TELEPHONE ENCOUNTER
ANTICOAGULATION MANAGEMENT:  Medication Refill    Anticoagulation Summary  As of 9/14/2023      Warfarin maintenance plan:  7.5 mg (5 mg x 1.5) every Tue, Sat; 5 mg (5 mg x 1) all other days   Next INR check:  10/12/2023   Target end date:  Indefinite    Indications    Paroxysmal atrial fibrillation (H) [I48.0]                 Anticoagulation Care Providers       Provider Role Specialty Phone number    Junie Golden MD Referring Family Medicine [phone]            Refill Criteria    Visit with referring provider/group: Meets criteria: office visit within referring provider group in the last 1 year on 1/30/23    [facility] referral signed last signed: 11/01/2022; within last year: Yes    Lab monitoring not exceeding 2 weeks overdue: Yes    Rian meets all criteria for refill. Rx instructions and quantity supplied updated to match patient's current dosing plan. Warfarin 90 day supply with 1 refill granted per [facility] protocol     Luda Grissom RN  Anticoagulation Clinic

## 2023-10-12 ENCOUNTER — LAB (OUTPATIENT)
Dept: LAB | Facility: CLINIC | Age: 78
End: 2023-10-12
Payer: MEDICARE

## 2023-10-12 ENCOUNTER — ANTICOAGULATION THERAPY VISIT (OUTPATIENT)
Dept: ANTICOAGULATION | Facility: CLINIC | Age: 78
End: 2023-10-12

## 2023-10-12 DIAGNOSIS — I10 HYPERTENSION GOAL BP (BLOOD PRESSURE) < 140/90: ICD-10-CM

## 2023-10-12 DIAGNOSIS — Z12.5 SCREENING PSA (PROSTATE SPECIFIC ANTIGEN): ICD-10-CM

## 2023-10-12 DIAGNOSIS — I48.0 PAROXYSMAL ATRIAL FIBRILLATION (H): ICD-10-CM

## 2023-10-12 DIAGNOSIS — I48.0 PAROXYSMAL ATRIAL FIBRILLATION (H): Primary | ICD-10-CM

## 2023-10-12 DIAGNOSIS — Z13.6 CARDIOVASCULAR SCREENING; LDL GOAL LESS THAN 130: ICD-10-CM

## 2023-10-12 LAB
ANION GAP SERPL CALCULATED.3IONS-SCNC: 11 MMOL/L (ref 7–15)
BUN SERPL-MCNC: 16.9 MG/DL (ref 8–23)
CALCIUM SERPL-MCNC: 9.2 MG/DL (ref 8.8–10.2)
CHLORIDE SERPL-SCNC: 103 MMOL/L (ref 98–107)
CHOLEST SERPL-MCNC: 164 MG/DL
CREAT SERPL-MCNC: 1.03 MG/DL (ref 0.67–1.17)
DEPRECATED HCO3 PLAS-SCNC: 27 MMOL/L (ref 22–29)
EGFRCR SERPLBLD CKD-EPI 2021: 74 ML/MIN/1.73M2
GLUCOSE SERPL-MCNC: 156 MG/DL (ref 70–99)
HDLC SERPL-MCNC: 43 MG/DL
INR BLD: 2.9 (ref 0.9–1.1)
LDLC SERPL CALC-MCNC: 101 MG/DL
NONHDLC SERPL-MCNC: 121 MG/DL
POTASSIUM SERPL-SCNC: 4.5 MMOL/L (ref 3.4–5.3)
PSA SERPL DL<=0.01 NG/ML-MCNC: 0.12 NG/ML (ref 0–6.5)
SODIUM SERPL-SCNC: 141 MMOL/L (ref 135–145)
TRIGL SERPL-MCNC: 101 MG/DL

## 2023-10-12 PROCEDURE — 80048 BASIC METABOLIC PNL TOTAL CA: CPT

## 2023-10-12 PROCEDURE — 36415 COLL VENOUS BLD VENIPUNCTURE: CPT

## 2023-10-12 PROCEDURE — 80061 LIPID PANEL: CPT

## 2023-10-12 PROCEDURE — 36416 COLLJ CAPILLARY BLOOD SPEC: CPT

## 2023-10-12 PROCEDURE — G0103 PSA SCREENING: HCPCS

## 2023-10-12 PROCEDURE — 85610 PROTHROMBIN TIME: CPT

## 2023-10-12 NOTE — PROGRESS NOTES
ANTICOAGULATION MANAGEMENT     Rian Ness 77 year old male is on warfarin with therapeutic INR result. (Goal INR 2.0-3.0)    Recent labs: (last 7 days)     10/12/23  0950   INR 2.9*       ASSESSMENT     Source(s): Chart Review and Patient/Caregiver Call     Warfarin doses taken: Warfarin taken as instructed  Diet: No new diet changes identified  Medication/supplement changes: None noted  New illness, injury, or hospitalization: No  Signs or symptoms of bleeding or clotting: No  Previous result: Therapeutic last 2(+) visits  Additional findings: None       PLAN     Recommended plan for no diet, medication or health factor changes affecting INR     Dosing Instructions: Continue your current warfarin dose with next INR in 5 weeks       Summary  As of 10/12/2023      Full warfarin instructions:  7.5 mg every Tue, Sat; 5 mg all other days   Next INR check:  11/16/2023               Telephone call with Rian who verbalizes understanding and agrees to plan    Lab visit scheduled    Education provided:   Contact [phone]  with any changes, questions or concerns.     Plan made per [facility] anticoagulation protocol    Salome Castellon RN  Anticoagulation Clinic  10/12/2023    _______________________________________________________________________     Anticoagulation Episode Summary       Current INR goal:  2.0-3.0   TTR:  75.7% (1 y)   Target end date:  Indefinite   Send INR reminders to:  [facility]    Indications    Paroxysmal atrial fibrillation (H) [I48.0]             Comments:               Anticoagulation Care Providers       Provider Role Specialty Phone number    Junie Golden MD Referring Family Medicine [phone]

## 2023-10-12 NOTE — LETTER
October 19, 2023      Rian Ness  [address] [facility] [id number]        Rian,     Your sugar was high on your recent labs.  I have ordered a follow-up test to rule out prediabetes.  You can have this drawn at your next lab appointment.     Junie Golden MD     Resulted Orders   PSA, screen   Result Value Ref Range    Prostate Specific Antigen Screen 0.12 0.00 - 6.50 ng/mL    Narrative    This result is obtained using the Roche Elecsys total PSA method on the lexa [id number] immunoassay analyzer. Results obtained with different assay methods or kits cannot be used interchangeably.   Basic metabolic panel  (Ca, Cl, CO2, Creat, Gluc, K, Na, BUN)   Result Value Ref Range    Sodium 141 135 - 145 mmol/L      Comment:      Reference intervals for this test were updated on 09/26/2023 to more accurately reflect our healthy population. There may be differences in the flagging of prior results with similar values performed with this method. Interpretation of those prior results can be made in the context of the updated reference intervals.     Potassium 4.5 3.4 - 5.3 mmol/L    Chloride 103 98 - 107 mmol/L    Carbon Dioxide (CO2) 27 22 - 29 mmol/L    Anion Gap 11 7 - 15 mmol/L    Urea Nitrogen 16.9 8.0 - 23.0 mg/dL    Creatinine 1.03 0.67 - 1.17 mg/dL    GFR Estimate 74 >60 mL/min/1.73m2    Calcium 9.2 8.8 - 10.2 mg/dL    Glucose 156 (H) 70 - 99 mg/dL   Lipid panel reflex to direct LDL Fasting   Result Value Ref Range    Cholesterol 164 <200 mg/dL    Triglycerides 101 <150 mg/dL    Direct Measure HDL 43 >=40 mg/dL    LDL Cholesterol Calculated 101 (H) <=100 mg/dL    Non HDL Cholesterol 121 <130 mg/dL    Narrative    Cholesterol  Desirable:  <200 mg/dL    Triglycerides  Normal:  Less than 150 mg/dL  Borderline High:  150-199 mg/dL  High:  200-499 mg/dL  Very High:  Greater than or equal to 500 mg/dL    Direct Measure HDL  Female:  Greater than or equal to 50 mg/dL   Male:  Greater than or equal to 40  mg/dL    LDL Cholesterol  Desirable:  <100mg/dL  Above Desirable:  100-129 mg/dL   Borderline High:  130-159 mg/dL   High:  160-189 mg/dL   Very High:  >= 190 mg/dL    Non HDL Cholesterol  Desirable:  130 mg/dL  Above Desirable:  130-159 mg/dL  Borderline High:  160-189 mg/dL  High:  190-219 mg/dL  Very High:  Greater than or equal to 220 mg/dL       If you have any questions or concerns, please call the clinic at the number listed above.

## 2023-10-19 NOTE — RESULT ENCOUNTER NOTE
Letter sent     Angeli Blevins,    [facility]

## 2023-10-25 ENCOUNTER — DOCUMENTATION ONLY (OUTPATIENT)
Dept: ANTICOAGULATION | Facility: CLINIC | Age: 78
End: 2023-10-25
Payer: MEDICARE

## 2023-10-25 DIAGNOSIS — I48.19 PERSISTENT ATRIAL FIBRILLATION (H): ICD-10-CM

## 2023-10-25 DIAGNOSIS — I48.0 PAROXYSMAL ATRIAL FIBRILLATION (H): Primary | ICD-10-CM

## 2023-10-25 NOTE — PROGRESS NOTES
ANTICOAGULATION CLINIC REFERRAL RENEWAL REQUEST       An annual renewal order is required for all patients referred to [facility].?  Please review and sign the pended referral order for Rian Ness.       ANTICOAGULATION SUMMARY      Warfarin indication(s)   Atrial Fibrillation    Mechanical heart valve present?  NO       Current goal range   INR: 2.0-3.0     Goal appropriate for indication? Goal INR 2-3, standard for indication(s) above     Time in Therapeutic Range (TTR)  (Goal > 60%) 75.7%       Office visit with referring provider's group within last year yes on 1/30/23         [facility]

## 2023-11-16 ENCOUNTER — LAB (OUTPATIENT)
Dept: LAB | Facility: CLINIC | Age: 78
End: 2023-11-16
Payer: MEDICARE

## 2023-11-16 ENCOUNTER — ANTICOAGULATION THERAPY VISIT (OUTPATIENT)
Dept: ANTICOAGULATION | Facility: CLINIC | Age: 78
End: 2023-11-16

## 2023-11-16 DIAGNOSIS — I48.0 PAROXYSMAL ATRIAL FIBRILLATION (H): Primary | ICD-10-CM

## 2023-11-16 DIAGNOSIS — I48.19 PERSISTENT ATRIAL FIBRILLATION (H): ICD-10-CM

## 2023-11-16 LAB — INR BLD: 3.1 (ref 0.9–1.1)

## 2023-11-16 PROCEDURE — 36416 COLLJ CAPILLARY BLOOD SPEC: CPT

## 2023-11-16 PROCEDURE — 85610 PROTHROMBIN TIME: CPT

## 2023-11-16 NOTE — PROGRESS NOTES
ANTICOAGULATION MANAGEMENT     Rian Ness 78 year old male is on warfarin with supratherapeutic INR result. (Goal INR 2.0-3.0)    Recent labs: (last 7 days)     11/16/23  1221   INR 3.1*       ASSESSMENT     Source(s): Chart Review and Patient/Caregiver Call     Warfarin doses taken: Warfarin taken as instructed  Diet: No new diet changes identified  Medication/supplement changes: None noted  New illness, injury, or hospitalization: No  Signs or symptoms of bleeding or clotting: No  Previous result: Therapeutic last 2(+) visits  Additional findings: None       PLAN     Recommended plan for no diet, medication or health factor changes affecting INR     Dosing Instructions: Continue your current warfarin dose with next INR in 2 weeks       Summary  As of 11/16/2023      Full warfarin instructions:  7.5 mg every Tue, Sat; 5 mg all other days   Next INR check:  11/30/2023               Telephone call with Rian who verbalizes understanding and agrees to plan and who agrees to plan and repeated back plan correctly    Lab visit scheduled    Education provided:   Goal range and lab monitoring: goal range and significance of current result, Importance of therapeutic range, and Importance of following up at instructed interval    Plan made per [facility] anticoagulation protocol    Salome Castellon, RN  Anticoagulation Clinic  11/16/2023    _______________________________________________________________________     Anticoagulation Episode Summary       Current INR goal:  2.0-3.0   TTR:  77.6% (1 y)   Target end date:  Indefinite   Send INR reminders to:  [facility]    Indications    Paroxysmal atrial fibrillation (H) [I48.0]  Persistent atrial fibrillation (H) [I48.19]             Comments:               Anticoagulation Care Providers       Provider Role Specialty Phone number    Junie Golden MD Referring Family Medicine [phone]

## 2023-11-30 ENCOUNTER — ANTICOAGULATION THERAPY VISIT (OUTPATIENT)
Dept: ANTICOAGULATION | Facility: CLINIC | Age: 78
End: 2023-11-30

## 2023-11-30 ENCOUNTER — LAB (OUTPATIENT)
Dept: LAB | Facility: CLINIC | Age: 78
End: 2023-11-30
Payer: MEDICARE

## 2023-11-30 DIAGNOSIS — I48.19 PERSISTENT ATRIAL FIBRILLATION (H): ICD-10-CM

## 2023-11-30 DIAGNOSIS — I48.0 PAROXYSMAL ATRIAL FIBRILLATION (H): Primary | ICD-10-CM

## 2023-11-30 LAB — INR BLD: 2 (ref 0.9–1.1)

## 2023-11-30 PROCEDURE — 36416 COLLJ CAPILLARY BLOOD SPEC: CPT

## 2023-11-30 PROCEDURE — 85610 PROTHROMBIN TIME: CPT

## 2023-11-30 NOTE — PROGRESS NOTES
ANTICOAGULATION MANAGEMENT     Rian Ness 78 year old male is on warfarin with therapeutic INR result. (Goal INR 2.0-3.0)    Recent labs: (last 7 days)     11/30/23  1059   INR 2.0*       ASSESSMENT     Source(s): Chart Review  Previous INR was Supratherapeutic  Medication, diet, health changes since last INR chart reviewed; none identified         PLAN     Recommended plan for no diet, medication or health factor changes affecting INR     Dosing Instructions: Continue your current warfarin dose with next INR in 2 weeks       Summary  As of 11/30/2023      Full warfarin instructions:  7.5 mg every Tue, Sat; 5 mg all other days   Next INR check:  12/14/2023               Detailed voice message left for Rian with dosing instructions and follow up date.     Contact [phone]  to schedule and with any changes, questions or concerns.     Education provided:   Please call back if any changes to your diet, medications or how you've been taking warfarin    Plan made per [facility] anticoagulation protocol    Salome Castellon RN  Anticoagulation Clinic  11/30/2023    _______________________________________________________________________     Anticoagulation Episode Summary       Current INR goal:  2.0-3.0   TTR:  77.2% (1 y)   Target end date:  Indefinite   Send INR reminders to:  [facility]    Indications    Paroxysmal atrial fibrillation (H) [I48.0]  Persistent atrial fibrillation (H) [I48.19]             Comments:               Anticoagulation Care Providers       Provider Role Specialty Phone number    Junie Golden MD Referring Family Medicine [phone]

## 2023-12-13 ENCOUNTER — OFFICE VISIT (OUTPATIENT)
Dept: FAMILY MEDICINE | Facility: CLINIC | Age: 78
End: 2023-12-13
Payer: MEDICARE

## 2023-12-13 VITALS
OXYGEN SATURATION: 98 % | TEMPERATURE: 98.8 F | SYSTOLIC BLOOD PRESSURE: 140 MMHG | DIASTOLIC BLOOD PRESSURE: 86 MMHG | WEIGHT: 196 LBS | RESPIRATION RATE: 22 BRPM | HEART RATE: 106 BPM | BODY MASS INDEX: 31.16 KG/M2

## 2023-12-13 DIAGNOSIS — R53.83 OTHER FATIGUE: ICD-10-CM

## 2023-12-13 DIAGNOSIS — C61 MALIGNANT NEOPLASM PROSTATE (H): ICD-10-CM

## 2023-12-13 DIAGNOSIS — M25.512 ACUTE PAIN OF LEFT SHOULDER: ICD-10-CM

## 2023-12-13 DIAGNOSIS — J30.2 SEASONAL ALLERGIC RHINITIS, UNSPECIFIED TRIGGER: ICD-10-CM

## 2023-12-13 DIAGNOSIS — K21.9 GASTROESOPHAGEAL REFLUX DISEASE WITHOUT ESOPHAGITIS: ICD-10-CM

## 2023-12-13 DIAGNOSIS — E55.9 VITAMIN D DEFICIENCY: ICD-10-CM

## 2023-12-13 DIAGNOSIS — I48.0 PAF (PAROXYSMAL ATRIAL FIBRILLATION) (H): ICD-10-CM

## 2023-12-13 DIAGNOSIS — Z00.00 ENCOUNTER FOR MEDICARE ANNUAL WELLNESS EXAM: Primary | ICD-10-CM

## 2023-12-13 PROCEDURE — G0439 PPPS, SUBSEQ VISIT: HCPCS | Performed by: FAMILY MEDICINE

## 2023-12-13 PROCEDURE — 99214 OFFICE O/P EST MOD 30 MIN: CPT | Mod: 25 | Performed by: FAMILY MEDICINE

## 2023-12-13 RX ORDER — METOPROLOL SUCCINATE 100 MG/1
100 TABLET, EXTENDED RELEASE ORAL DAILY
Qty: 90 TABLET | Refills: 1 | Status: SHIPPED | OUTPATIENT
Start: 2023-12-13 | End: 2024-08-20

## 2023-12-13 RX ORDER — LORATADINE 10 MG/1
10 TABLET ORAL DAILY
Qty: 90 TABLET | Refills: 3 | Status: SHIPPED | OUTPATIENT
Start: 2023-12-13

## 2023-12-13 RX ORDER — RESPIRATORY SYNCYTIAL VIRUS VACCINE 120MCG/0.5
0.5 KIT INTRAMUSCULAR ONCE
Qty: 1 EACH | Refills: 0 | Status: CANCELLED | OUTPATIENT
Start: 2023-12-13 | End: 2023-12-13

## 2023-12-13 ASSESSMENT — ENCOUNTER SYMPTOMS
ARTHRALGIAS: 1
DIZZINESS: 0
CHILLS: 0
SHORTNESS OF BREATH: 0
SORE THROAT: 0
PALPITATIONS: 0
ABDOMINAL PAIN: 0
HEADACHES: 0
FATIGUE: 1
NAUSEA: 0
DYSURIA: 0
HEMATOCHEZIA: 0
JOINT SWELLING: 0
MYALGIAS: 0
MYALGIAS: 1
NERVOUS/ANXIOUS: 0
FEVER: 0
EYE PAIN: 0
CONSTIPATION: 0
FREQUENCY: 0
HEMATURIA: 0
HEARTBURN: 0
COUGH: 0
DIARRHEA: 0
PARESTHESIAS: 0
WEAKNESS: 0

## 2023-12-13 ASSESSMENT — PAIN SCALES - GENERAL: PAINLEVEL: SEVERE PAIN (6)

## 2023-12-13 ASSESSMENT — ACTIVITIES OF DAILY LIVING (ADL): CURRENT_FUNCTION: NO ASSISTANCE NEEDED

## 2023-12-13 NOTE — PATIENT INSTRUCTIONS
Patient Education   Personalized Prevention Plan  You are due for the preventive services outlined below.  Your care team is available to assist you in scheduling these services.  If you have already completed any of these items, please share that information with your care team to update in your medical record.  Health Maintenance Due   Topic Date Due    ANNUAL REVIEW OF HM ORDERS  Never done    Zoster (Shingles) Vaccine (1 of 2) Never done    RSV VACCINE (Pregnancy & 60+) (1 - 1-dose 60+ series) Never done    Diptheria Tetanus Pertussis (DTAP/TDAP/TD) Vaccine (2 - Td or Tdap) 12/03/2022    Flu Vaccine (1) 09/01/2023    COVID-19 Vaccine (5 - 2023-24 season) 09/01/2023          Ok to take tylenol (acetaminophen) 1000 mg every 6 hours for shoulder pain.    Expect calls to schedule physical therapy and orthopedics for your shoulder.

## 2023-12-13 NOTE — PROGRESS NOTES
"SUBJECTIVE:   Rian is a 78 year old, presenting for the following:  Wellness Visit        12/13/2023    10:14 AM   Additional Questions   Roomed by misa   Accompanied by self       Are you in the first 12 months of your Medicare coverage?  No    Healthy Habits:     In general, how would you rate your overall health?  Fair    Frequency of exercise:  1 day/week    Duration of exercise:  30-45 minutes    Do you usually eat at least 4 servings of fruit and vegetables a day, include whole grains    & fiber and avoid regularly eating high fat or \"junk\" foods?  No    Taking medications regularly:  No    Barriers to taking medications:  None    Medication side effects:  None    Ability to successfully perform activities of daily living:  No assistance needed    Home Safety:  No safety concerns identified    Hearing Impairment:  No hearing concerns    In the past 6 months, have you been bothered by leaking of urine?  No    In general, how would you rate your overall mental or emotional health?  Good    Additional concerns today:  No      Today's PHQ-2 Score:       12/13/2023    10:13 AM   PHQ-2 ( 1999 [employer])   Q1: Little interest or pleasure in doing things 1   Q2: Feeling down, depressed or hopeless 1   PHQ-2 Score 2   Q1: Little interest or pleasure in doing things Several days   Q2: Feeling down, depressed or hopeless Several days   PHQ-2 Score 2           Have you ever done Advance Care Planning? (For example, a Health Directive, POLST, or a discussion with a medical provider or your loved ones about your wishes): No, advance care planning information given to patient to review.  Patient plans to discuss their wishes with loved ones or provider.         Fall risk  Fallen 2 or more times in the past year?: No  Any fall with injury in the past year?: No    Cognitive Screening Normal cognition based on my direct observation during interview and exam.         Reviewed and updated as needed this visit by clinical staff   " Tobacco  Allergies  Meds  Problems  Med Hx  Surg Hx  Fam Hx          Reviewed and updated as needed this visit by Provider   Tobacco  Allergies  Meds  Problems  Med Hx  Surg Hx  Fam Hx         Social History     Tobacco Use    Smoking status: Former     Packs/day: 1.50     Years: 34.00     Additional pack years: 0.00     Total pack years: 51.00     Types: Cigarettes     Quit date: 1984     Years since quittin.9    Smokeless tobacco: Never    Tobacco comments:     Nonsmoking household   Substance Use Topics    Alcohol use: Yes     Comment: very lightly             2023    10:49 AM   Alcohol Use   Prescreen: >3 drinks/day or >7 drinks/week? No     Do you have a current opioid prescription? No  Do you use any other controlled substances or medications that are not prescribed by a provider? None              Current providers sharing in care for this patient include:   Patient Care Team:  Junie Golden MD as PCP - General (Family Practice)  Junie Golden MD as Assigned PCP  Yash Stevens MD as MD (Cardiology)  Jared Abebe MD as Assigned Surgical Provider    The following health maintenance items are reviewed in Epic and correct as of today:  Health Maintenance   Topic Date Due    ANNUAL REVIEW OF HM ORDERS  Never done    ZOSTER IMMUNIZATION (1 of 2) Never done    RSV VACCINE (Pregnancy & 60+) (1 - 1-dose 60+ series) Never done    COVID-19 Vaccine (2023- season) 2023    EYE EXAM  2024    BMP  10/12/2024    PSA  10/12/2024    MEDICARE ANNUAL WELLNESS VISIT  2024    FALL RISK ASSESSMENT  2024    ADVANCE CARE PLANNING  2027    LIPID  10/12/2028    DTAP/TDAP/TD IMMUNIZATION (3 - Td or Tdap) 2033    PHQ-2 (once per calendar year)  Completed    INFLUENZA VACCINE  Completed    Pneumococcal Vaccine: 65+ Years  Completed    IPV IMMUNIZATION  Aged Out    HPV IMMUNIZATION  Aged Out    MENINGITIS IMMUNIZATION  Aged Out    RSV MONOCLONAL  ANTIBODY  Aged Out    COLORECTAL CANCER SCREENING  Discontinued       Family history of prostate cancer: Yes pt  Last PSA:   PSA   Date Value Ref Range Status   03/22/2021 0.09 0 - 4 ug/L Final     Comment:     Assay Method:  Chemiluminescence using Siemens Vista analyzer     Prostate Specific Antigen Screen   Date Value Ref Range Status   10/12/2023 0.12 0.00 - 6.50 ng/mL Final     PSA Tumor Marker   Date Value Ref Range Status   04/13/2022 0.10 0.00 - 4.00 ug/L Final     Doing self testicular exam: NO     Family history of colon cancer:No  Last colonscopy: 2009, no longer screening     Family history of CAD:No  Last Cholesterol:   Lab Results   Component Value Date    CHOL 164 10/12/2023    CHOL 165 11/15/2017     Lab Results   Component Value Date    HDL 43 10/12/2023    HDL 46 11/15/2017     Lab Results   Component Value Date     10/12/2023    LDL 93 11/15/2017     Lab Results   Component Value Date    TRIG 101 10/12/2023    TRIG 130 11/15/2017     Lab Results   Component Value Date    CHOLHDLRATIO 4.9 11/26/2012          Immunizations:    Td tdap 12/2023  Covid:6/2022  Flu 12/2023  Shingrix: declined  Pneumovax done     Seat Belt:YES   Sunscreen use: YES  Calcium Intake: adeq   Health Care Directive:NO   Sexually Active:NO      Current contraception: none     Current Concerns: left shoulder pain, started 2 days ago, rapid onset during the day, recalls lifting a piece of equipment and shoving it in the bed of his truck 4 days ago. No other triggering event. Unable to dress without significant pain, very limited ROM.   No fall or injury.       Patient Ed:  Reviewed health maintenance including diet, regular exercise, and periodic exams.     The risks, benefits, and treatment options of prescribed medications or other treatments have been discussed with the patient.  The patient should call or schedule a follow up appt if no improvement or other problems.   Labs reviewed in EPIC  BP Readings from Last 3  Encounters:   23 (!) 140/86   23 138/83   22 126/78    Wt Readings from Last 3 Encounters:   23 88.9 kg (196 lb)   23 97 kg (213 lb 12.8 oz)   22 97.8 kg (215 lb 9.6 oz)                  Patient Active Problem List   Diagnosis    Testicular hypofunction    Osteoarthritis, knee    Malignant neoplasm prostate (H)    CARDIOVASCULAR SCREENING; LDL GOAL LESS THAN 130    Advanced directives, counseling/discussion    Status post total knee replacement    AC (acromioclavicular) joint arthritis    Biceps tendonitis    Pseudophakia,ou; Yag Caps, os    Hypertension goal BP (blood pressure) < 140/90    Gastroesophageal reflux disease without esophagitis    Posterior vitreous detachment, bilateral    S/P complete repair of rotator cuff    Paroxysmal atrial fibrillation (H)    Persistent atrial fibrillation (H)     Past Surgical History:   Procedure Laterality Date    ABDOMEN SURGERY      ARTHROSCOPY KNEE RT/LT      right    ARTHROSCOPY KNEE RT/LT      left    CATARACT IOL, RT/LT      COLONOSCOPY      LASER YAG CAPSULOTOMY Left 2023    PHACOEMULSIFICATION WITH STANDARD INTRAOCULAR LENS IMPLANT  2010; 2010    left eye; right eye    SUPRAPUBIC PROSTATECTOMY  2011    VASCULAR SURGERY      ZZC TOTAL KNEE ARTHROPLASTY  10/2011    right knee       Social History     Tobacco Use    Smoking status: Former     Packs/day: 1.50     Years: 34.00     Additional pack years: 0.00     Total pack years: 51.00     Types: Cigarettes     Quit date: 1984     Years since quittin.9    Smokeless tobacco: Never    Tobacco comments:     Nonsmoking household   Substance Use Topics    Alcohol use: Yes     Comment: very lightly     Family History   Problem Relation Age of Onset    Cancer Mother         leukemia    Depression Other     Depression Other     Prostate Cancer Brother     Prostate Cancer Brother     Prostate Cancer Brother          Current Outpatient Medications   Medication Sig  "Dispense Refill    DAILY MULTIVITAMIN PO 1 tab daily      loratadine (CLARITIN) 10 MG tablet Take 1 tablet (10 mg) by mouth daily 90 tablet 3    MAGNESIUM OR 1 tablet daily      metoprolol succinate ER (TOPROL XL) 100 MG 24 hr tablet Take 1 tablet (100 mg) by mouth daily 90 tablet 1    omeprazole (PRILOSEC) 20 MG DR capsule TAKE 1 CAPSULE TWICE DAILY 180 capsule 3    warfarin ANTICOAGULANT (COUMADIN) 5 MG tablet TAKE 1 AND 1/2 TABLETS ON TUESDAYS/Saturdays,  AND 1 TABLET ALL OTHER DAYS OF THE WEEK OR AS DIRECTED 100 tablet 10             Review of Systems   Constitutional:  Positive for fatigue. Negative for chills and fever.   HENT:  Negative for congestion, ear pain, hearing loss and sore throat.    Eyes:  Negative for pain and visual disturbance.   Respiratory:  Negative for cough and shortness of breath.    Cardiovascular:  Negative for chest pain, palpitations and peripheral edema.   Gastrointestinal:  Negative for abdominal pain, constipation, diarrhea, heartburn, hematochezia and nausea.   Genitourinary:  Negative for dysuria, frequency, genital sores, hematuria, impotence, penile discharge and urgency.   Musculoskeletal:  Positive for arthralgias (left shoulder pain x 2 days.  no fall/injury) and myalgias (aching and tired for past month. low energy). Negative for joint swelling.   Skin:  Negative for rash.   Neurological:  Negative for dizziness, weakness, headaches and paresthesias.   Psychiatric/Behavioral:  Negative for mood changes. The patient is not nervous/anxious.          OBJECTIVE:   BP (!) 140/86   Pulse 106   Temp 98.8  F (37.1  C) (Tympanic)   Resp 22   Wt 88.9 kg (196 lb)   SpO2 98%   BMI 31.16 kg/m   Estimated body mass index is 31.16 kg/m  as calculated from the following:    Height as of 1/30/23: 1.689 m (5' 6.5\").    Weight as of this encounter: 88.9 kg (196 lb).  Physical Exam  GENERAL: healthy, alert and no distress  EYES: Eyes grossly normal to inspection, PERRL and conjunctivae " and sclerae normal  HENT: ear canals and TM's normal, nose and mouth without ulcers or lesions  NECK: no adenopathy, no asymmetry, masses, or scars and thyroid normal to palpation  RESP: lungs clear to auscultation - no rales, rhonchi or wheezes  CV: irregularly irregular rhythm, normal S1 S2, no S3 or S4, no murmur, click or rub, peripheral pulses strong, and no peripheral edema  ABDOMEN: soft, nontender, no hepatosplenomegaly, no masses and bowel sounds normal  MS: left shoulder with very limited ROM to include all planes for shoulder as well as flexion of elbow, + ttp over AC joint  SKIN: no suspicious lesions or rashes  NEURO: Normal strength and tone, mentation intact and speech normal  PSYCH: mentation appears normal, affect normal/bright    Diagnostic Test Results:  Labs reviewed in Epic    ASSESSMENT / PLAN:   (Z00.00) Encounter for Medicare annual wellness exam  (primary encounter diagnosis)  Comment: preventive needs reviewed   Plan: see orders in Epic.     (M25.512) Acute pain of left shoulder  Comment: likely AC separation with rotator cuff injury  Plan: Physical Therapy Referral, Orthopedic         Referral        Refer to PT and ortho  Tylenol for pain    (C61) Malignant neoplasm prostate (H)  Comment: recent PSA low/stable  Plan: continue yearly check    (I48.0) PAF (paroxysmal atrial fibrillation) (H)  Comment: rate controlled  Plan: metoprolol succinate ER (TOPROL XL) 100 MG 24         hr tablet         Refill x 6 months     (K21.9) Gastroesophageal reflux disease without esophagitis  Comment: controlled  Plan: omeprazole (PRILOSEC) 20 MG DR capsule        Refill x 1 yr     (J30.2) Seasonal allergic rhinitis, unspecified trigger  Comment: stable  Plan: loratadine (CLARITIN) 10 MG tablet        Refill x 1 yr     (R53.83) Other fatigue  Comment: uncertain etiology  Plan: TSH with free T4 reflex, Ferritin, CBC with         platelets        Check labs  Consider adjusting metoprolol, may be due  "to worsening rate control?    (E55.9) Vitamin D deficiency  Comment: due  Plan: Vitamin D Deficiency                  COUNSELING:  Reviewed preventive health counseling, as reflected in patient instructions  Special attention given to:       Fall risk prevention      BMI:   Estimated body mass index is 31.16 kg/m  as calculated from the following:    Height as of 1/30/23: 1.689 m (5' 6.5\").    Weight as of this encounter: 88.9 kg (196 lb).   Weight management plan: Discussed healthy diet and exercise guidelines      He reports that he quit smoking about 39 years ago. His smoking use included cigarettes. He has a 51.00 pack-year smoking history. He has never used smokeless tobacco.      Appropriate preventive services were discussed with this patient, including applicable screening as appropriate for fall prevention, nutrition, physical activity, Tobacco-use cessation, weight loss and cognition.  Checklist reviewing preventive services available has been given to the patient.    Reviewed patients plan of care and provided an AVS. The Complex Care Plan (for patients with higher acuity and needing more deliberate coordination of services) for Rian meets the Care Plan requirement. This Care Plan has been established and reviewed with the Patient.          Junie Golden MD  [facility]    Identified Health Risks:  I have reviewed Opioid Use Disorder and Substance Use Disorder risk factors and made any needed referrals.   "

## 2023-12-13 NOTE — PROGRESS NOTES
SUBJECTIVE:   Rian is a 78 year old, presenting for the following:  Wellness Visit        2023    10:14 AM   Additional Questions   Roomed by misa   Accompanied by self       Are you in the first 12 months of your Medicare coverage?  No    HPI        Have you ever done Advance Care Planning? (For example, a Health Directive, POLST, or a discussion with a medical provider or your loved ones about your wishes): No, advance care planning information given to patient to review.  {Advance Care Planning No Options:337902}    {Hearing Test Done (Optional):187797}   Fall risk  Fallen 2 or more times in the past year?: No  Any fall with injury in the past year?: No    Cognitive Screening { :740409}    {Do you have sleep apnea, excessive snoring or daytime drowsiness? (Optional):859817}    Reviewed and updated as needed this visit by clinical staff                  Reviewed and updated as needed this visit by Provider                 Social History     Tobacco Use    Smoking status: Former     Packs/day: 1.50     Years: 34.00     Additional pack years: 0.00     Total pack years: 51.00     Types: Cigarettes     Quit date: 1984     Years since quittin.8    Smokeless tobacco: Never    Tobacco comments:     Nonsmoking household   Substance Use Topics    Alcohol use: Yes     Comment: very lightly     {Rooming staff  Click this link to complete the Prescreen if response below is not for today's visit  Alcohol Use Prescreen >3 drinks/day or > 7 drinks/week.  If the prescreen question answer is YES, complete the full AUDIT  :450744}         No data to display            {add AUDIT responses (Optional) (A score of 7 for adult men is an indication of hazardous drinking; a score of 8 or more is an indication of an alcohol use disorder.  A score of 7 or more for adult women is an indication of hazardous drinking or an alchohol use disorder):417711}  Do you have a current opioid prescription? No  Do you use any other  "controlled substances or medications that are not prescribed by a provider? Alcohol      {Outside tests to abstract? (Optional):758915}    {additional problems to add (Optional):804522}    Current providers sharing in care for this patient include: {  Patient Care Team:  Junie Golden MD as PCP - General (Family Practice)  Junie Golden MD as Assigned PCP  Yash Stevens MD as MD (Cardiology)  Jared Abebe MD as Assigned Surgical Provider    The following health maintenance items are reviewed in Epic and correct as of today:  Health Maintenance   Topic Date Due    ANNUAL REVIEW OF HM ORDERS  Never done    ZOSTER IMMUNIZATION (1 of 2) Never done    RSV VACCINE (Pregnancy & 60+) (1 - 1-dose 60+ series) Never done    DTAP/TDAP/TD IMMUNIZATION (2 - Td or Tdap) 12/03/2022    MEDICARE ANNUAL WELLNESS VISIT  06/16/2023    FALL RISK ASSESSMENT  06/16/2023    INFLUENZA VACCINE (1) 09/01/2023    COVID-19 Vaccine (5 - 2023-24 season) 09/01/2023    EYE EXAM  02/06/2024    BMP  10/12/2024    PSA  10/12/2024    ADVANCE CARE PLANNING  06/16/2027    LIPID  10/12/2028    PHQ-2 (once per calendar year)  Completed    Pneumococcal Vaccine: 65+ Years  Completed    IPV IMMUNIZATION  Aged Out    HPV IMMUNIZATION  Aged Out    MENINGITIS IMMUNIZATION  Aged Out    RSV MONOCLONAL ANTIBODY  Aged Out    COLORECTAL CANCER SCREENING  Discontinued     {Chronicprobdata (optional):056326}  {Decision Support (Optional):143596}        Review of Systems  {ROS COMP (Optional):599555}    OBJECTIVE:   There were no vitals taken for this visit. Estimated body mass index is 33.99 kg/m  as calculated from the following:    Height as of 1/30/23: 1.689 m (5' 6.5\").    Weight as of 1/30/23: 97 kg (213 lb 12.8 oz).  Physical Exam  {Exam (Optional) :142851}    {Diagnostic Test Results (Optional):601676}    ASSESSMENT / PLAN:   {Diag Picklist:829333}    {Patient advised of split billing (Optional):795311}      COUNSELING:  {Medicare " "Counselin}      BMI:   Estimated body mass index is 33.99 kg/m  as calculated from the following:    Height as of 23: 1.689 m (5' 6.5\").    Weight as of 23: 97 kg (213 lb 12.8 oz).   {Weight Management Plan needed for ACO:761380}      He reports that he quit smoking about 39 years ago. His smoking use included cigarettes. He has a 51.00 pack-year smoking history. He has never used smokeless tobacco.      Appropriate preventive services were discussed with this patient, including applicable screening as appropriate for fall prevention, nutrition, physical activity, Tobacco-use cessation, weight loss and cognition.  Checklist reviewing preventive services available has been given to the patient.    Reviewed patients plan of care and provided an AVS. The {CarePlan:600539} for Rian meets the Care Plan requirement. This Care Plan has been established and reviewed with the {PATIENT, FAMILY MEMBER, CAREGIVER:754240}.    {Counseling Resources  [place] Preventive Services Task Force  Cholesterol Screening  Health diet/nutrition  Pooled Cohorts Equation Calculator  Burt's MyPlate  ASA Prophylaxis  Lung CA Screening  Osteoporosis prevention/bone health :578101}  {Prostate Cancer Screening  Consider for men 55-69 per guidance from USPSTF :853157}    Junie Golden MD  [facility]    Identified Health Risks:  {Medicare required documentation of substance and opioid use disorders screening :049894}  "

## 2023-12-14 ENCOUNTER — LAB (OUTPATIENT)
Dept: LAB | Facility: CLINIC | Age: 78
End: 2023-12-14
Payer: MEDICARE

## 2023-12-14 ENCOUNTER — ANTICOAGULATION THERAPY VISIT (OUTPATIENT)
Dept: ANTICOAGULATION | Facility: CLINIC | Age: 78
End: 2023-12-14

## 2023-12-14 DIAGNOSIS — R73.09 ELEVATED GLUCOSE: ICD-10-CM

## 2023-12-14 DIAGNOSIS — R53.83 OTHER FATIGUE: ICD-10-CM

## 2023-12-14 DIAGNOSIS — I48.19 PERSISTENT ATRIAL FIBRILLATION (H): ICD-10-CM

## 2023-12-14 DIAGNOSIS — E55.9 VITAMIN D DEFICIENCY: ICD-10-CM

## 2023-12-14 DIAGNOSIS — I48.0 PAROXYSMAL ATRIAL FIBRILLATION (H): Primary | ICD-10-CM

## 2023-12-14 LAB
ERYTHROCYTE [DISTWIDTH] IN BLOOD BY AUTOMATED COUNT: 13.4 % (ref 10–15)
HBA1C MFR BLD: 5.8 % (ref 0–5.6)
HCT VFR BLD AUTO: 40.8 % (ref 40–53)
HGB BLD-MCNC: 13.1 G/DL (ref 13.3–17.7)
INR BLD: 3.5 (ref 0.9–1.1)
MCH RBC QN AUTO: 30 PG (ref 26.5–33)
MCHC RBC AUTO-ENTMCNC: 32.1 G/DL (ref 31.5–36.5)
MCV RBC AUTO: 93 FL (ref 78–100)
PLATELET # BLD AUTO: 209 10E3/UL (ref 150–450)
RBC # BLD AUTO: 4.37 10E6/UL (ref 4.4–5.9)
WBC # BLD AUTO: 7.5 10E3/UL (ref 4–11)

## 2023-12-14 PROCEDURE — 36415 COLL VENOUS BLD VENIPUNCTURE: CPT

## 2023-12-14 PROCEDURE — 82728 ASSAY OF FERRITIN: CPT | Mod: GZ

## 2023-12-14 PROCEDURE — 84443 ASSAY THYROID STIM HORMONE: CPT

## 2023-12-14 PROCEDURE — 85027 COMPLETE CBC AUTOMATED: CPT

## 2023-12-14 PROCEDURE — 83036 HEMOGLOBIN GLYCOSYLATED A1C: CPT

## 2023-12-14 PROCEDURE — 82306 VITAMIN D 25 HYDROXY: CPT

## 2023-12-14 PROCEDURE — 36416 COLLJ CAPILLARY BLOOD SPEC: CPT

## 2023-12-14 PROCEDURE — 85610 PROTHROMBIN TIME: CPT

## 2023-12-14 NOTE — PROGRESS NOTES
ANTICOAGULATION MANAGEMENT     Rian Ness 78 year old male is on warfarin with supratherapeutic INR result. (Goal INR 2.0-3.0)    Recent labs: (last 7 days)     12/14/23  1403   INR 3.5*       ASSESSMENT     Source(s): Chart Review and Patient/Caregiver Call     Warfarin doses taken: Warfarin taken as instructed  Diet: Decreased greens/vitamin K in diet; plans to resume previous intake  Medication/supplement changes: None noted  New illness, injury, or hospitalization: Yes: Hasn't fell well in general for about the last month. Has lost 10 lbs in the last few months  Signs or symptoms of bleeding or clotting: No  Previous result: Therapeutic last visit; previously outside of goal range  Additional findings: None       PLAN     Recommended plan for temporary change(s) and ongoing change(s) affecting INR     Dosing Instructions: Continue your current warfarin dose with next INR in 2 weeks   - pt wants to try and just get some extra Vit K back in his diet and see where his INR is in two weeks. If still high, we can adjust his maintenance dose.     Summary  As of 12/14/2023      Full warfarin instructions:  7.5 mg every Tue, Sat; 5 mg all other days   Next INR check:  12/28/2023               Telephone call with Rian who verbalizes understanding and agrees to plan and who agrees to plan and repeated back plan correctly    Lab visit scheduled    Education provided:   Goal range and lab monitoring: goal range and significance of current result, Importance of therapeutic range, and Importance of following up at instructed interval  Dietary considerations: importance of consistent vitamin K intake    Plan made per [facility] anticoagulation protocol    Salome Castellon RN  Anticoagulation Clinic  12/14/2023    _______________________________________________________________________     Anticoagulation Episode Summary       Current INR goal:  2.0-3.0   TTR:  77.4% (1 y)   Target end date:  Indefinite   Send INR reminders to:   [facility]    Indications    Paroxysmal atrial fibrillation (H) [I48.0]  Persistent atrial fibrillation (H) [I48.19]             Comments:               Anticoagulation Care Providers       Provider Role Specialty Phone number    Junie Golden MD [facility] [phone]

## 2023-12-15 LAB
FERRITIN SERPL-MCNC: 305 NG/ML (ref 31–409)
TSH SERPL DL<=0.005 MIU/L-ACNC: 2.41 UIU/ML (ref 0.3–4.2)
VIT D+METAB SERPL-MCNC: 30 NG/ML (ref 20–50)

## 2023-12-28 ENCOUNTER — ANTICOAGULATION THERAPY VISIT (OUTPATIENT)
Dept: ANTICOAGULATION | Facility: CLINIC | Age: 78
End: 2023-12-28

## 2023-12-28 ENCOUNTER — LAB (OUTPATIENT)
Dept: LAB | Facility: CLINIC | Age: 78
End: 2023-12-28
Payer: MEDICARE

## 2023-12-28 DIAGNOSIS — I48.0 PAROXYSMAL ATRIAL FIBRILLATION (H): Primary | ICD-10-CM

## 2023-12-28 DIAGNOSIS — I48.19 PERSISTENT ATRIAL FIBRILLATION (H): ICD-10-CM

## 2023-12-28 LAB — INR BLD: 2.9 (ref 0.9–1.1)

## 2023-12-28 PROCEDURE — 36416 COLLJ CAPILLARY BLOOD SPEC: CPT

## 2023-12-28 PROCEDURE — 85610 PROTHROMBIN TIME: CPT

## 2023-12-28 NOTE — PROGRESS NOTES
ANTICOAGULATION MANAGEMENT     Rian Ness 78 year old male is on warfarin with therapeutic INR result. (Goal INR 2.0-3.0)    Recent labs: (last 7 days)     12/28/23  1031   INR 2.9*       ASSESSMENT     Source(s): Chart Review and Patient/Caregiver Call     Warfarin doses taken: Warfarin taken as instructed  Diet: No new diet changes identified  Medication/supplement changes: None noted  New illness, injury, or hospitalization: No  Signs or symptoms of bleeding or clotting: No  Previous result: Supratherapeutic  Additional findings: None       PLAN     Recommended plan for no diet, medication or health factor changes affecting INR     Dosing Instructions: Continue your current warfarin dose with next INR in 3 weeks       Summary  As of 12/28/2023      Full warfarin instructions:  7.5 mg every Tue, Sat; 5 mg all other days   Next INR check:  1/18/2024               Telephone call with Rian who verbalizes understanding and agrees to plan and who agrees to plan and repeated back plan correctly    Lab visit scheduled    Education provided:   Contact [phone]  with any changes, questions or concerns.     Plan made per [facility] anticoagulation protocol    Salome Castellon RN  Anticoagulation Clinic  12/28/2023    _______________________________________________________________________     Anticoagulation Episode Summary       Current INR goal:  2.0-3.0   TTR:  74.2% (1 y)   Target end date:  Indefinite   Send INR reminders to:  [facility]    Indications    Paroxysmal atrial fibrillation (H) [I48.0]  Persistent atrial fibrillation (H) [I48.19]             Comments:               Anticoagulation Care Providers       Provider Role Specialty Phone number    Junie Golden MD Referring Family Medicine [phone]

## 2024-01-18 ENCOUNTER — LAB (OUTPATIENT)
Dept: LAB | Facility: CLINIC | Age: 79
End: 2024-01-18
Payer: MEDICARE

## 2024-01-18 ENCOUNTER — ANTICOAGULATION THERAPY VISIT (OUTPATIENT)
Dept: ANTICOAGULATION | Facility: CLINIC | Age: 79
End: 2024-01-18

## 2024-01-18 DIAGNOSIS — I48.0 PAROXYSMAL ATRIAL FIBRILLATION (H): Primary | ICD-10-CM

## 2024-01-18 DIAGNOSIS — I48.19 PERSISTENT ATRIAL FIBRILLATION (H): ICD-10-CM

## 2024-01-18 LAB — INR BLD: 3.3 (ref 0.9–1.1)

## 2024-01-18 PROCEDURE — 85610 PROTHROMBIN TIME: CPT

## 2024-01-18 PROCEDURE — 36416 COLLJ CAPILLARY BLOOD SPEC: CPT

## 2024-01-18 NOTE — PROGRESS NOTES
ANTICOAGULATION MANAGEMENT     Rian Ness 78 year old male is on warfarin with supratherapeutic INR result. (Goal INR 2.0-3.0)    Recent labs: (last 7 days)     01/18/24  0953   INR 3.3*       ASSESSMENT     Source(s): Chart Review and Patient/Caregiver Call     Warfarin doses taken: Warfarin taken as instructed  Diet:  Pt reports that he did have a salad and some green vegetables yesterday, and overall greens consistent.  Medication/supplement changes: None noted  New illness, injury, or hospitalization: No  Signs or symptoms of bleeding or clotting: No  Previous result: Therapeutic last visit; previously outside of goal range  Additional findings: None       PLAN     Recommended plan for no diet, medication or health factor changes affecting INR     Dosing Instructions: decrease your warfarin dose (6.2% change) with next INR in 2 weeks       Summary  As of 1/18/2024      Full warfarin instructions:  7.5 mg every Tue; 5 mg all other days   Next INR check:  2/1/2024               Telephone call with Rian who verbalizes understanding and agrees to plan and who agrees to plan and repeated back plan correctly    Lab visit scheduled    Education provided:   Dietary considerations: importance of consistent vitamin K intake    Plan made per [facility] anticoagulation protocol    Kina Kelly RN  Anticoagulation Clinic  1/18/2024    _______________________________________________________________________     Anticoagulation Episode Summary       Current INR goal:  2.0-3.0   TTR:  69.9% (1 y)   Target end date:  Indefinite   Send INR reminders to:  [facility]    Indications    Paroxysmal atrial fibrillation (H) [I48.0]  Persistent atrial fibrillation (H) [I48.19]             Comments:               Anticoagulation Care Providers       Provider Role Specialty Phone number    Junie Golden MD Referring Family Medicine [phone]

## 2024-02-02 ENCOUNTER — MYC MEDICAL ADVICE (OUTPATIENT)
Dept: ANTICOAGULATION | Facility: CLINIC | Age: 79
End: 2024-02-02
Payer: MEDICARE

## 2024-02-02 ENCOUNTER — ANTICOAGULATION THERAPY VISIT (OUTPATIENT)
Dept: ANTICOAGULATION | Facility: CLINIC | Age: 79
End: 2024-02-02

## 2024-02-02 ENCOUNTER — LAB (OUTPATIENT)
Dept: LAB | Facility: CLINIC | Age: 79
End: 2024-02-02
Payer: MEDICARE

## 2024-02-02 DIAGNOSIS — I48.19 PERSISTENT ATRIAL FIBRILLATION (H): ICD-10-CM

## 2024-02-02 DIAGNOSIS — I48.0 PAROXYSMAL ATRIAL FIBRILLATION (H): Primary | ICD-10-CM

## 2024-02-02 LAB — INR BLD: 2 (ref 0.9–1.1)

## 2024-02-02 PROCEDURE — 85610 PROTHROMBIN TIME: CPT

## 2024-02-02 PROCEDURE — 36416 COLLJ CAPILLARY BLOOD SPEC: CPT

## 2024-02-02 NOTE — PROGRESS NOTES
ANTICOAGULATION MANAGEMENT     Rian Ness 78 year old male is on warfarin with therapeutic INR result. (Goal INR 2.0-3.0)    Recent labs: (last 7 days)     02/02/24  1226   INR 2.0*       ASSESSMENT     Source(s): Chart Review and Patient/Caregiver Call     Warfarin doses taken: Warfarin taken as instructed  Diet: No new diet changes identified  Medication/supplement changes: None noted  New illness, injury, or hospitalization: No  Signs or symptoms of bleeding or clotting: No  Previous result: Supratherapeutic  Additional findings: None       PLAN     Recommended plan for no diet, medication or health factor changes affecting INR     Dosing Instructions: Continue your current warfarin dose with next INR in 3 weeks       Summary  As of 2/2/2024      Full warfarin instructions:  7.5 mg every Tue; 5 mg all other days   Next INR check:  2/22/2024               Telephone call with Rian who verbalizes understanding and agrees to plan    Lab visit scheduled    Education provided:   Please call back if any changes to your diet, medications or how you've been taking warfarin  Contact [phone]  with any changes, questions or concerns.     Plan made per [facility]agulation protocol    Herlinda Evangelista RN  Anticoagulation Clinic  2/2/2024    _______________________________________________________________________     Anticoagulation Episode Summary       Current INR goal:  2.0-3.0   TTR:  68.9% (1 y)   Target end date:  Indefinite   Send INR reminders to:  [facility]    Indications    Paroxysmal atrial fibrillation (H) [I48.0]  Persistent atrial fibrillation (H) [I48.19]             Comments:               Anticoagulation Care Providers       Provider Role Specialty Phone number    Junie Golden MD Referring Family Medicine [phone]

## 2024-02-22 ENCOUNTER — ANTICOAGULATION THERAPY VISIT (OUTPATIENT)
Dept: ANTICOAGULATION | Facility: CLINIC | Age: 79
End: 2024-02-22

## 2024-02-22 ENCOUNTER — LAB (OUTPATIENT)
Dept: LAB | Facility: CLINIC | Age: 79
End: 2024-02-22
Payer: MEDICARE

## 2024-02-22 DIAGNOSIS — I48.19 PERSISTENT ATRIAL FIBRILLATION (H): ICD-10-CM

## 2024-02-22 DIAGNOSIS — I48.0 PAROXYSMAL ATRIAL FIBRILLATION (H): Primary | ICD-10-CM

## 2024-02-22 LAB — INR BLD: 2.2 (ref 0.9–1.1)

## 2024-02-22 PROCEDURE — 85610 PROTHROMBIN TIME: CPT

## 2024-02-22 PROCEDURE — 36416 COLLJ CAPILLARY BLOOD SPEC: CPT

## 2024-02-22 NOTE — PROGRESS NOTES
ANTICOAGULATION MANAGEMENT     Rian Ness 78 year old male is on warfarin with therapeutic INR result. (Goal INR 2.0-3.0)    Recent labs: (last 7 days)     02/22/24  1007   INR 2.2*       ASSESSMENT     Source(s): Chart Review and Patient/Caregiver Call     Warfarin doses taken: Warfarin taken as instructed  Diet: No new diet changes identified  Medication/supplement changes: None noted  New illness, injury, or hospitalization: No  Signs or symptoms of bleeding or clotting: No  Previous result: Therapeutic last visit; previously outside of goal range  Additional findings: None       PLAN     Recommended plan for no diet, medication or health factor changes affecting INR     Dosing Instructions: Continue your current warfarin dose with next INR in 4 weeks       Summary  As of 2/22/2024      Full warfarin instructions:  7.5 mg every Tue; 5 mg all other days   Next INR check:  3/21/2024               Telephone call with Rian who verbalizes understanding and agrees to plan    Lab visit scheduled    Education provided:   Contact [phone]  with any changes, questions or concerns.     Plan made per [facility]agulation protocol    Anahi Hernandez RN  Anticoagulation Clinic  2/22/2024    _______________________________________________________________________     Anticoagulation Episode Summary       Current INR goal:  2.0-3.0   TTR:  68.9% (1 y)   Target end date:  Indefinite   Send INR reminders to:  [facility]    Indications    Paroxysmal atrial fibrillation (H) [I48.0]  Persistent atrial fibrillation (H) [I48.19]             Comments:               Anticoagulation Care Providers       Provider Role Specialty Phone number    Junie Golden MD Referring Family Medicine [phone]

## 2024-02-28 ENCOUNTER — OFFICE VISIT (OUTPATIENT)
Dept: OPHTHALMOLOGY | Facility: CLINIC | Age: 79
End: 2024-02-28
Payer: MEDICARE

## 2024-02-28 DIAGNOSIS — H43.813 POSTERIOR VITREOUS DETACHMENT, BILATERAL: ICD-10-CM

## 2024-02-28 DIAGNOSIS — Z96.1 PSEUDOPHAKIA: Primary | ICD-10-CM

## 2024-02-28 DIAGNOSIS — Z01.01 ENCOUNTER FOR EXAMINATION OF EYES AND VISION WITH ABNORMAL FINDINGS: ICD-10-CM

## 2024-02-28 DIAGNOSIS — H52.4 PRESBYOPIA: ICD-10-CM

## 2024-02-28 PROCEDURE — 92015 DETERMINE REFRACTIVE STATE: CPT | Mod: GY | Performed by: OPHTHALMOLOGY

## 2024-02-28 PROCEDURE — 92014 COMPRE OPH EXAM EST PT 1/>: CPT | Performed by: OPHTHALMOLOGY

## 2024-02-28 ASSESSMENT — REFRACTION_MANIFEST
OS_SPHERE: -1.25
OD_CYLINDER: +2.00
OS_AXIS: 176
OS_CYLINDER: +2.00
OD_ADD: +2.50
OS_ADD: +2.50
OD_SPHERE: -1.00
OD_AXIS: 002

## 2024-02-28 ASSESSMENT — TONOMETRY
OD_IOP_MMHG: 16
OS_IOP_MMHG: 17
IOP_METHOD: APPLANATION

## 2024-02-28 ASSESSMENT — CONF VISUAL FIELD
OS_NORMAL: 1
OD_INFERIOR_TEMPORAL_RESTRICTION: 0
OD_NORMAL: 1
OD_INFERIOR_NASAL_RESTRICTION: 0
OD_SUPERIOR_NASAL_RESTRICTION: 0
OS_INFERIOR_NASAL_RESTRICTION: 0
OS_SUPERIOR_TEMPORAL_RESTRICTION: 0
OD_SUPERIOR_TEMPORAL_RESTRICTION: 0
OS_SUPERIOR_NASAL_RESTRICTION: 0
OS_INFERIOR_TEMPORAL_RESTRICTION: 0

## 2024-02-28 ASSESSMENT — EXTERNAL EXAM - RIGHT EYE: OD_EXAM: 2+ BROW PTOSIS, PROLAPSED FAT PADS: UPPER, LOWER

## 2024-02-28 ASSESSMENT — REFRACTION_WEARINGRX
OS_CYLINDER: +2.00
OS_ADD: +2.50
OD_SPHERE: -0.75
OD_ADD: +2.50
OS_AXIS: 176
OS_SPHERE: -1.25
SPECS_TYPE: BIFOCAL
OD_CYLINDER: +1.50
OD_AXIS: 003

## 2024-02-28 ASSESSMENT — SLIT LAMP EXAM - LIDS
COMMENTS: 2+ DERMATOCHALASIS - UPPER LID
COMMENTS: 2+ DERMATOCHALASIS - UPPER LID

## 2024-02-28 ASSESSMENT — CUP TO DISC RATIO
OS_RATIO: 0.4
OD_RATIO: 0.2

## 2024-02-28 ASSESSMENT — VISUAL ACUITY
CORRECTION_TYPE: GLASSES
OS_CC: 20/20
OD_CC+: +1
OD_CC: J1+
OD_CC: 20/30
METHOD: SNELLEN - LINEAR
OS_CC: J1+

## 2024-02-28 ASSESSMENT — EXTERNAL EXAM - LEFT EYE: OS_EXAM: 2+ BROW PTOSIS, PROLAPSED FAT PADS: UPPER, LOWER

## 2024-02-28 NOTE — PATIENT INSTRUCTIONS
"Glasses prescription given - optional    May use artificial tears up to four times a day (like Refresh Optive, Systane Balance, or TheraTears. Avoid \"get the red out\" drops and generic artifical tears).     Possible clouding of posterior capsule right eye discussed.    Call in October 2024 for an appointment in February 2025 for Complete Exam    Dr. Abebe [phone]    "

## 2024-02-28 NOTE — PROGRESS NOTES
" Current Eye Medications:  artificial tears both eyes as needed.       Subjective:  Comprehensive Eye Exam.  No vision changes or concerns.  When looking at the computer screen, vision is clearer without correction.  Otherwise, vision is good distance and near with correction.      Objective:  See Ophthalmology Exam.       Assessment:  Stable eye exam.      ICD-10-CM    1. Pseudophakia,ou; Yag Caps, os  Z96.1       2. Posterior vitreous detachment, bilateral  H43.813       3. Encounter for examination of eyes and vision with abnormal findings  Z01.01       4. Presbyopia  H52.4            Plan:  Glasses prescription given - optional    May use artificial tears up to four times a day (like Refresh Optive, Systane Balance, or TheraTears. Avoid \"get the red out\" drops and generic artifical tears).     Possible clouding of posterior capsule right eye discussed.    Call in October 2024 for an appointment in February 2025 for Complete Exam    Dr. Abebe [phone]       "

## 2024-02-28 NOTE — LETTER
"    2/28/2024         RE: Rian Ness  [address] [facility] [id number]        Dear Colleague,    Thank you for referring your patient, Rian Ness, to the [facility]. Please see a copy of my visit note below.     Current Eye Medications:  artificial tears both eyes as needed.       Subjective:  Comprehensive Eye Exam.  No vision changes or concerns.  When looking at the computer screen, vision is clearer without correction.  Otherwise, vision is good distance and near with correction.      Objective:  See Ophthalmology Exam.       Assessment:  Stable eye exam.      ICD-10-CM    1. Pseudophakia,ou; Yag Caps, os  Z96.1       2. Posterior vitreous detachment, bilateral  H43.813       3. Encounter for examination of eyes and vision with abnormal findings  Z01.01       4. Presbyopia  H52.4            Plan:  Glasses prescription given - optional    May use artificial tears up to four times a day (like Refresh Optive, Systane Balance, or TheraTears. Avoid \"get the red out\" drops and generic artifical tears).     Possible clouding of posterior capsule right eye discussed.    Call in October 2024 for an appointment in February 2025 for Complete Exam    Dr. Abebe [phone]         Again, thank you for allowing me to participate in the care of your patient.        Sincerely,        Jared Abebe MD  "

## 2024-03-21 ENCOUNTER — ANTICOAGULATION THERAPY VISIT (OUTPATIENT)
Dept: ANTICOAGULATION | Facility: CLINIC | Age: 79
End: 2024-03-21

## 2024-03-21 ENCOUNTER — LAB (OUTPATIENT)
Dept: LAB | Facility: CLINIC | Age: 79
End: 2024-03-21
Payer: MEDICARE

## 2024-03-21 DIAGNOSIS — I48.19 PERSISTENT ATRIAL FIBRILLATION (H): ICD-10-CM

## 2024-03-21 LAB — INR BLD: 2.2 (ref 0.9–1.1)

## 2024-03-21 PROCEDURE — 85610 PROTHROMBIN TIME: CPT

## 2024-03-21 PROCEDURE — 36416 COLLJ CAPILLARY BLOOD SPEC: CPT

## 2024-03-21 NOTE — PROGRESS NOTES
ANTICOAGULATION MANAGEMENT     Rian Ness 78 year old male is on warfarin with therapeutic INR result. (Goal INR 2.0-3.0)    Recent labs: (last 7 days)     03/21/24  1018   INR 2.2*       ASSESSMENT     Source(s): Chart Review and Patient/Caregiver Call     Warfarin doses taken: Warfarin taken as instructed  Diet: No new diet changes identified  Medication/supplement changes: None noted  New illness, injury, or hospitalization: No  Signs or symptoms of bleeding or clotting: No  Previous result: Therapeutic last 2(+) visits  Additional findings: None       PLAN     Recommended plan for no diet, medication or health factor changes affecting INR     Dosing Instructions: Continue your current warfarin dose with next INR in 5 weeks       Summary  As of 3/21/2024      Full warfarin instructions:  7.5 mg every Tue; 5 mg all other days   Next INR check:  4/25/2024               Telephone call with Rian who verbalizes understanding and agrees to plan    Lab visit scheduled    Education provided:   Contact [phone]  with any changes, questions or concerns.     Plan made per [facility]agulation protocol    Kavya Mariee RN  Anticoagulation Clinic  3/21/2024    _______________________________________________________________________     Anticoagulation Episode Summary       Current INR goal:  2.0-3.0   TTR:  68.9% (1 y)   Target end date:  Indefinite   Send INR reminders to:  [facility]    Indications    Paroxysmal atrial fibrillation (H) [I48.0]  Persistent atrial fibrillation (H) [I48.19]             Comments:               Anticoagulation Care Providers       Provider Role Specialty Phone number    Junie Golden MD Referring Family Medicine [phone]

## 2024-04-25 ENCOUNTER — LAB (OUTPATIENT)
Dept: LAB | Facility: CLINIC | Age: 79
End: 2024-04-25
Payer: MEDICARE

## 2024-04-25 ENCOUNTER — ANTICOAGULATION THERAPY VISIT (OUTPATIENT)
Dept: ANTICOAGULATION | Facility: CLINIC | Age: 79
End: 2024-04-25

## 2024-04-25 DIAGNOSIS — I48.0 PAROXYSMAL ATRIAL FIBRILLATION (H): Primary | ICD-10-CM

## 2024-04-25 DIAGNOSIS — I48.19 PERSISTENT ATRIAL FIBRILLATION (H): ICD-10-CM

## 2024-04-25 LAB — INR BLD: 2.1 (ref 0.9–1.1)

## 2024-04-25 PROCEDURE — 85610 PROTHROMBIN TIME: CPT

## 2024-04-25 PROCEDURE — 36416 COLLJ CAPILLARY BLOOD SPEC: CPT

## 2024-04-25 NOTE — PROGRESS NOTES
ANTICOAGULATION MANAGEMENT     Rian Ness 78 year old male is on warfarin with therapeutic INR result. (Goal INR 2.0-3.0)    Recent labs: (last 7 days)     04/25/24  1031   INR 2.1*       ASSESSMENT     Source(s): Chart Review and Patient/Caregiver Call     Warfarin doses taken: Warfarin taken as instructed  Diet: No new diet changes identified  Medication/supplement changes: None noted  New illness, injury, or hospitalization: No  Signs or symptoms of bleeding or clotting: No  Previous result: Therapeutic last 2(+) visits  Additional findings: None       PLAN     Recommended plan for no diet, medication or health factor changes affecting INR     Dosing Instructions: Continue your current warfarin dose with next INR in 6 weeks       Summary  As of 4/25/2024      Full warfarin instructions:  7.5 mg every Tue; 5 mg all other days   Next INR check:  6/6/2024               Telephone call with Rian who verbalizes understanding and agrees to plan and who agrees to plan and repeated back plan correctly    Lab visit scheduled    Education provided:   None required    Plan made per [facility]agulation protocol    Kina Kelly RN  Anticoagulation Clinic  4/25/2024    _______________________________________________________________________     Anticoagulation Episode Summary       Current INR goal:  2.0-3.0   TTR:  68.9% (1 y)   Target end date:  Indefinite   Send INR reminders to:  [facility]    Indications    Paroxysmal atrial fibrillation (H) [I48.0]  Persistent atrial fibrillation (H) [I48.19]             Comments:               Anticoagulation Care Providers       Provider Role Specialty Phone number    Junie Golden MD Referring Family Medicine [phone]

## 2024-05-29 ENCOUNTER — TELEPHONE (OUTPATIENT)
Dept: ANTICOAGULATION | Facility: CLINIC | Age: 79
End: 2024-05-29

## 2024-05-29 ENCOUNTER — OFFICE VISIT (OUTPATIENT)
Dept: FAMILY MEDICINE | Facility: CLINIC | Age: 79
End: 2024-05-29
Payer: MEDICARE

## 2024-05-29 VITALS
RESPIRATION RATE: 20 BRPM | WEIGHT: 205 LBS | DIASTOLIC BLOOD PRESSURE: 75 MMHG | HEART RATE: 108 BPM | SYSTOLIC BLOOD PRESSURE: 116 MMHG | TEMPERATURE: 98.7 F | BODY MASS INDEX: 32.18 KG/M2 | HEIGHT: 67 IN | OXYGEN SATURATION: 96 %

## 2024-05-29 DIAGNOSIS — L03.115 CELLULITIS OF RIGHT LEG: Primary | ICD-10-CM

## 2024-05-29 DIAGNOSIS — I48.19 PERSISTENT ATRIAL FIBRILLATION (H): ICD-10-CM

## 2024-05-29 DIAGNOSIS — C61 MALIGNANT NEOPLASM PROSTATE (H): ICD-10-CM

## 2024-05-29 DIAGNOSIS — I48.0 PAROXYSMAL ATRIAL FIBRILLATION (H): ICD-10-CM

## 2024-05-29 DIAGNOSIS — I48.0 PAROXYSMAL ATRIAL FIBRILLATION (H): Primary | ICD-10-CM

## 2024-05-29 PROCEDURE — G2211 COMPLEX E/M VISIT ADD ON: HCPCS | Performed by: FAMILY MEDICINE

## 2024-05-29 PROCEDURE — 99213 OFFICE O/P EST LOW 20 MIN: CPT | Performed by: FAMILY MEDICINE

## 2024-05-29 RX ORDER — LEVOFLOXACIN 500 MG/1
500 TABLET, FILM COATED ORAL DAILY
Qty: 7 TABLET | Refills: 0 | Status: SHIPPED | OUTPATIENT
Start: 2024-05-29 | End: 2024-06-03

## 2024-05-29 RX ORDER — RESPIRATORY SYNCYTIAL VIRUS VACCINE 120MCG/0.5
0.5 KIT INTRAMUSCULAR ONCE
Qty: 1 EACH | Refills: 0 | Status: CANCELLED | OUTPATIENT
Start: 2024-05-29 | End: 2024-05-29

## 2024-05-29 ASSESSMENT — PAIN SCALES - GENERAL: PAINLEVEL: MILD PAIN (2)

## 2024-05-29 NOTE — TELEPHONE ENCOUNTER
"ANTICOAGULATION  MANAGEMENT     Interacting Medication Review    Interacting medication(s): Levofloxacin (Levaquin) with warfarin.    Duration: 7 days (5/29 to 6/4)    Indication: Cellulitis    New medication?: Yes, interaction may increase INR and risk of bleeding. Closer INR monitoring recommended.        PLAN     Continue your current warfarin dose with next INR in 3-5 days            Telephone call with Rian who verbalizes understanding and agrees to plan    New recheck date updated on anticoagulation calendar     [facility] priority set/moved to \"high\" for new major drug interaction    Plan made per [facility] anticoagulation protocol    Tanya Timmons RN  Anticoagulation Clinic    "

## 2024-05-29 NOTE — PROGRESS NOTES
Assessment & Plan     (L03.115) Cellulitis of right leg  (primary encounter diagnosis)  Comment: cellulitis with medical comorbidity  Plan: levofloxacin (LEVAQUIN) 500 MG tablet        Start levofloxacin 50 mg daily for 7 days  Adaptic dressing placed over open wound, ace wrap used to add compression.  Continue compression socks and elevation  Recommend every other day dressing changes - if unable to complete by himself, can come to ancillary for dressing change.    (I48.0) Paroxysmal atrial fibrillation (H)  Comment: NSR today  Plan: continue anticoagulation and will forward this to INR nurse for guidance on next testing due to abx.    (C61) Malignant neoplasm prostate (H)  Comment: stable  Plan: follows with urology      The longitudinal plan of care for the diagnosis(es)/condition(s) as documented were addressed during this visit. Due to the added complexity in care, I will continue to support Rian in the subsequent management and with ongoing continuity of care.          See Patient Instructions    Subjective   Rian is a 78 year old, presenting for the following health issues:  Ankle Pain (Right ankle pain and swelling ) and Edema      5/29/2024    10:34 AM   Additional Questions   Roomed by Kellie   Accompanied by self         5/29/2024    10:34 AM   Patient Reported Additional Medications   Patient reports taking the following new medications N/a     Ankle Pain       Rian comes in today complaining of right ankle pain for the past week or more.  His ankle is painful constantly had a dull ache and then has episodes of sharp stabbing pain.  He is also noted that it is oozing his socks get wet when he wears them through the day.  He has noticed swelling and redness as well.  There is now an open area on his anterior lower leg.    He reports no fevers or chills no other ill symptoms.  He does report his left leg has dark coloration similar to what his right ankle had however it is not as warm and is not  "bernie Modi lives alone.  He has significant bilateral knee arthritis as well as back issues.  He has mobility and flexibility limitations            Review of Systems  Constitutional, HEENT, cardiovascular, pulmonary, gi and gu systems are negative, except as otherwise noted.      Objective    /75   Pulse 108   Temp 98.7  F (37.1  C) (Tympanic)   Resp 20   Ht 1.689 m (5' 6.5\")   Wt 93 kg (205 lb)   SpO2 96%   BMI 32.59 kg/m    Body mass index is 32.59 kg/m .  Physical Exam   GENERAL: alert and no distress  EYES: Eyes grossly normal to inspection, PERRL and conjunctivae and sclerae normal  MS: Right lower leg and ankle are erythematous on top of chronic venous stasis pigmentation.  1+ edema, There is a 4 x 3 area of shallow open wound.  Area is warm and redness extends up to mid lower extremity.  Left lower leg shows venous stasis changes no warmth no erythema no open wound. Trace edema            Signed Electronically by: Junie Golden MD    "

## 2024-05-30 ENCOUNTER — TELEPHONE (OUTPATIENT)
Dept: FAMILY MEDICINE | Facility: CLINIC | Age: 79
End: 2024-05-30
Payer: MEDICARE

## 2024-05-30 NOTE — TELEPHONE ENCOUNTER
"Patient called in regards to his visit yesterday with PCP. Notes he had his leg wrapped at time of visit and doesn't recall the instructions for home care.  Pt asking how often his dressing or wrap needs to be changed?  Pt asking where he is to have this done? Notes \"I live alone and am old and can't bend over to take it off and put a new one one\".  Asking if he is supposed to be scheduling an appointment to have old wrap removed and new one replaced? And if so, how long should he be doing this for? Wants to schedule all necessary apts now.    Per review of chart, note patient was seen and diagnosed with cellulitis of right leg.  Provider charting notes are incomplete at this time so not clear on follow up plan and patient doesn't recall any instructions. Did pt get an Unna boot put on? Or similar?       Routing to provider to review and advise on plan please. Thank you!        Marissa Marc RN  Clinical Triage/Primary Care  [facility]          "

## 2024-05-30 NOTE — TELEPHONE ENCOUNTER
Pt needs simple dressing change.  Can go on ancillary for 5/31/2024 then again on Monday Lizeth 3.

## 2024-05-31 ENCOUNTER — OFFICE VISIT (OUTPATIENT)
Dept: FAMILY MEDICINE | Facility: CLINIC | Age: 79
End: 2024-05-31
Payer: MEDICARE

## 2024-05-31 VITALS
SYSTOLIC BLOOD PRESSURE: 110 MMHG | DIASTOLIC BLOOD PRESSURE: 62 MMHG | WEIGHT: 206 LBS | RESPIRATION RATE: 14 BRPM | TEMPERATURE: 98.3 F | BODY MASS INDEX: 32.33 KG/M2 | OXYGEN SATURATION: 98 % | HEIGHT: 67 IN | HEART RATE: 97 BPM

## 2024-05-31 DIAGNOSIS — L03.115 CELLULITIS OF RIGHT LEG: ICD-10-CM

## 2024-05-31 DIAGNOSIS — R60.0 PERIPHERAL EDEMA: Primary | ICD-10-CM

## 2024-05-31 PROCEDURE — 99213 OFFICE O/P EST LOW 20 MIN: CPT | Performed by: FAMILY MEDICINE

## 2024-05-31 RX ORDER — FUROSEMIDE 20 MG
TABLET ORAL
Qty: 10 TABLET | Refills: 0 | Status: SHIPPED | OUTPATIENT
Start: 2024-05-31 | End: 2024-06-03

## 2024-05-31 ASSESSMENT — PAIN SCALES - GENERAL: PAINLEVEL: MILD PAIN (2)

## 2024-05-31 NOTE — PROGRESS NOTES
"ASSESSMENT / PLAN:  (R60.0) Peripheral edema  (primary encounter diagnosis)  Comment: needs help  Plan: furosemide (LASIX) 20 MG tablet        Wrap/elevate. Will add prn lasix. Increase water and potassium in diet (potatoes/fruit). Follow-up early next week. Expected course and warning signs reviewed. Call/email with questions/concerns      (L03.373) Cellulitis of right leg  Comment: slowly improving  Plan: continue treatment. MA wrapped. Continue antbiotic. To Er if worsening pain/redness/fevers or chills.       Rose Modi is a 78 year old, presenting for the following health issues:  WOUND CARE        5/31/2024    11:42 AM   Additional Questions   Roomed by lindsay   Accompanied by self         5/31/2024    11:42 AM   Patient Reported Additional Medications   Patient reports taking the following new medications n/a     History of Present Illness       Reason for visit:  Wound care    He eats 0-1 servings of fruits and vegetables daily.He consumes 3 sweetened beverage(s) daily.He exercises with enough effort to increase his heart rate 9 or less minutes per day.  He is missing 1 dose(s) of medications per week.  He is not taking prescribed medications regularly due to remembering to take.                     Objective    /62   Pulse 97   Temp 98.3  F (36.8  C) (Tympanic)   Resp 14   Ht 1.689 m (5' 6.5\")   Wt 93.4 kg (206 lb)   SpO2 98%   BMI 32.75 kg/m    Body mass index is 32.75 kg/m .  Physical Exam   GENERAL: alert and no distress  RESP: lungs clear to auscultation - no rales, rhonchi or wheezes  CV: regular rate and rhythm, normal S1 S2, no S3 or S4, no murmur, click or rub, no peripheral edema   MS: bilateral +1 LOWER EXTREMETIES edema left > right   SKIN: palm sized shallow wound left mid inner shin area. Mild redness surronding. Lots of varicose veins bilateral leg.   PSYCH: mentation appears normal, affect normal/bright            Signed Electronically by: Ron Queen, " MD  SUBJECTIVE:  Rian Ness, a 78 year old male scheduled an appointment to discuss the following issues:  Follow-up wound/edema.  Pre-dm, PAF and htn. Seen by pmd 2 days ago and given levaquin and compression dressing.   No fevers or chills. No chest pain or shortness of breath. No history dvt - on blood thinner. History varicose veins and seen vascular surgery in past. No diuretics. Urine stable recently. Some pain. No spreading redness. Open sore left inner shin stable  No side effects from levaquin  Medical, social, surgical, and family histories reviewed.    ROS:      OBJECTIVE:    EXAM:

## 2024-06-03 ENCOUNTER — ANTICOAGULATION THERAPY VISIT (OUTPATIENT)
Dept: ANTICOAGULATION | Facility: CLINIC | Age: 79
End: 2024-06-03

## 2024-06-03 ENCOUNTER — TELEPHONE (OUTPATIENT)
Dept: FAMILY MEDICINE | Facility: CLINIC | Age: 79
End: 2024-06-03

## 2024-06-03 ENCOUNTER — OFFICE VISIT (OUTPATIENT)
Dept: FAMILY MEDICINE | Facility: CLINIC | Age: 79
End: 2024-06-03
Payer: MEDICARE

## 2024-06-03 ENCOUNTER — LAB (OUTPATIENT)
Dept: LAB | Facility: CLINIC | Age: 79
End: 2024-06-03
Payer: MEDICARE

## 2024-06-03 VITALS
DIASTOLIC BLOOD PRESSURE: 75 MMHG | RESPIRATION RATE: 20 BRPM | HEART RATE: 106 BPM | TEMPERATURE: 98.2 F | OXYGEN SATURATION: 95 % | SYSTOLIC BLOOD PRESSURE: 120 MMHG | BODY MASS INDEX: 32.15 KG/M2 | WEIGHT: 204.8 LBS | HEIGHT: 67 IN

## 2024-06-03 DIAGNOSIS — R60.0 PERIPHERAL EDEMA: ICD-10-CM

## 2024-06-03 DIAGNOSIS — L03.115 CELLULITIS OF RIGHT LEG: Primary | ICD-10-CM

## 2024-06-03 DIAGNOSIS — I10 HYPERTENSION GOAL BP (BLOOD PRESSURE) < 140/90: ICD-10-CM

## 2024-06-03 DIAGNOSIS — I48.19 PERSISTENT ATRIAL FIBRILLATION (H): ICD-10-CM

## 2024-06-03 DIAGNOSIS — I48.0 PAROXYSMAL ATRIAL FIBRILLATION (H): ICD-10-CM

## 2024-06-03 DIAGNOSIS — I48.0 PAROXYSMAL ATRIAL FIBRILLATION (H): Primary | ICD-10-CM

## 2024-06-03 LAB — INR BLD: 2 (ref 0.9–1.1)

## 2024-06-03 PROCEDURE — 85610 PROTHROMBIN TIME: CPT

## 2024-06-03 PROCEDURE — 36416 COLLJ CAPILLARY BLOOD SPEC: CPT

## 2024-06-03 PROCEDURE — 99214 OFFICE O/P EST MOD 30 MIN: CPT | Performed by: FAMILY MEDICINE

## 2024-06-03 PROCEDURE — G2211 COMPLEX E/M VISIT ADD ON: HCPCS | Performed by: FAMILY MEDICINE

## 2024-06-03 RX ORDER — LEVOFLOXACIN 500 MG/1
500 TABLET, FILM COATED ORAL DAILY
Qty: 3 TABLET | Refills: 0 | Status: SHIPPED | OUTPATIENT
Start: 2024-06-03

## 2024-06-03 RX ORDER — FUROSEMIDE 20 MG
TABLET ORAL
Qty: 90 TABLET | Refills: 1 | Status: SHIPPED | OUTPATIENT
Start: 2024-06-03

## 2024-06-03 RX ORDER — RESPIRATORY SYNCYTIAL VIRUS VACCINE 120MCG/0.5
0.5 KIT INTRAMUSCULAR ONCE
Qty: 1 EACH | Refills: 0 | Status: CANCELLED | OUTPATIENT
Start: 2024-06-03 | End: 2024-06-03

## 2024-06-03 ASSESSMENT — PAIN SCALES - GENERAL: PAINLEVEL: MODERATE PAIN (4)

## 2024-06-03 NOTE — PROGRESS NOTES
ANTICOAGULATION MANAGEMENT     Rian Ness 78 year old male is on warfarin with therapeutic INR result. (Goal INR 2.0-3.0)    Recent labs: (last 7 days)     06/03/24  1041   INR 2.0*       ASSESSMENT     Source(s): Chart Review and Patient/Caregiver Call     Warfarin doses taken: Warfarin taken as instructed  Diet: No new diet changes identified  Medication/supplement changes:  Currently on Levaquin for cellulitis - BPA reviewed and closer INR monitoring is recommended.    New illness, injury, or hospitalization: Yes: Currently being treated for cellulitis  Signs or symptoms of bleeding or clotting: No  Previous result: Therapeutic last 2(+) visits  Additional findings: None       PLAN     Recommended plan for temporary change(s) affecting INR     Dosing Instructions: Continue your current warfarin dose with next INR in 4 days       Summary  As of 6/3/2024      Full warfarin instructions:  7.5 mg every Tue; 5 mg all other days   Next INR check:  6/7/2024               Telephone call with Rian who verbalizes understanding and agrees to plan and who agrees to plan and repeated back plan correctly    Lab visit scheduled    Education provided:   Goal range and lab monitoring: goal range and significance of current result, Importance of therapeutic range, and Importance of following up at instructed interval  Interaction IS anticipated between warfarin and antibiotics  Symptom monitoring: monitoring for bleeding signs and symptoms, monitoring for clotting signs and symptoms, and when to seek medical attention/emergency care    Plan made per [facility] anticoagulation protocol    Salome Castellon RN  Anticoagulation Clinic  6/3/2024    _______________________________________________________________________     Anticoagulation Episode Summary       Current INR goal:  2.0-3.0   TTR:  73.5% (1 y)   Target end date:  Indefinite   Send INR reminders to:  [facility]    Indications    Paroxysmal atrial fibrillation (H)  [I48.0]  Persistent atrial fibrillation (H) [I48.19]             Comments:               Anticoagulation Care Providers       Provider Role Specialty Phone number    Junie Golden MD Referring Family Medicine [phone]

## 2024-06-03 NOTE — PROGRESS NOTES
"  Assessment & Plan     (L03.115) Cellulitis of right leg  (primary encounter diagnosis)  (R60.0) Peripheral edema  Comment: improved though not progressing as expected  Plan: levofloxacin (LEVAQUIN) 500 MG tablet, furosemide (LASIX) 20 MG tablet,   Home Care Referral        Extend abx to full 10 days. Continue furosemide as edema is improving in non-affected leg and bp much improve, check bmp at next INR.  Home care referral, pt unable to care for wound himself and significant pain remains that affects ambulation and driving.   Keep leg elevated as much as possible    (I48.0) Paroxysmal atrial fibrillation (H)  Comment: stable on warfairin  Plan: Home Care Referral         INR today    (I10) Hypertension goal BP (blood pressure) < 140/90  Comment: much improved on furosemide  Plan: Basic metabolic panel  (Ca, Cl, CO2, Creat,         Gluc, K, Na, BUN)        Check potassium, reviewed potassium rich foods.     The longitudinal plan of care for the diagnosis(es)/condition(s) as documented were addressed during this visit. Due to the added complexity in care, I will continue to support Rian in the subsequent management and with ongoing continuity of care.         BMI  Estimated body mass index is 32.56 kg/m  as calculated from the following:    Height as of this encounter: 1.689 m (5' 6.5\").    Weight as of this encounter: 92.9 kg (204 lb 12.8 oz).   Weight management plan: Discussed healthy diet and exercise guidelines      See Patient Instructions    Subjective   Rian is a 78 year old, presenting for the following health issues:  RECHECK      6/3/2024    11:53 AM   Additional Questions   Roomed by Mat     History of Present Illness       Reason for visit:  Wound care (wound still wrapped)    He eats 0-1 servings of fruits and vegetables daily.He consumes 3 sweetened beverage(s) daily.He exercises with enough effort to increase his heart rate 9 or less minutes per day.  He is missing 1 dose(s) of medications per " "week.  He is not taking prescribed medications regularly due to remembering to take.     Rian returns today for wound check.  He was seen on Friday dressing was changed showed some mild improvement on the antibiotics however the swelling was not improving as expected.  He was started on 20 mg of furosemide which has helped reduce the swelling in his nonaffected leg.    His right leg remains slightly swollen, warmth and redness have improved.  He remains erythematous and warm over the medial aspect of his proximal foot and distal lower leg over the ankle.  He continues to have some areas of skin breakdown.  He reports that he is unable to do his dressing changes himself he has significant pain with ambulating he is continuing to drive himself though admits he does have some pain while driving.  He is unable to ambulate much more than 100 feet without having to stop to rest.    He is tolerating the furosemide well and his blood pressures below goal.  He reports no dizziness no symptoms consistent with orthostasis.    No fevers no chills.  Lives alone prepares his own meals and continues to work on small engines at his home.  He does report that his legs feel better when he has rested and with them elevated            Review of Systems  Constitutional, HEENT, cardiovascular, pulmonary, gi and gu systems are negative, except as otherwise noted.      Objective    /75   Pulse 106   Temp 98.2  F (36.8  C) (Oral)   Resp 20   Ht 1.689 m (5' 6.5\")   Wt 92.9 kg (204 lb 12.8 oz)   SpO2 95%   BMI 32.56 kg/m    Body mass index is 32.56 kg/m .  Physical Exam   GENERAL: alert and no distress  EYES: Eyes grossly normal to inspection, PERRL and conjunctivae and sclerae normal  MS: RLE exam shows erythema medially from midfoot, over ankle and up distal 1/3 of leg, edema 1+ improved from 3+, still areas of skin breakdown, no drainage.    LLE exam shows venous stasis staining, improved edema to trace only          "     Signed Electronically by: Junie Golden MD

## 2024-06-03 NOTE — TELEPHONE ENCOUNTER
I am not a wound doctor, he is not improving and needs wound assessment and treatment.  Frequency of wound care will need to be determined by wound nurse along with recommendations for type of dressing or topical therapies.    He is able to drive though should minimize driving due to this involving his right leg.    He lives alone and no one is available to help him.  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      All of this information is in today's OV note.

## 2024-06-03 NOTE — TELEPHONE ENCOUNTER
Returned call to Tania at the phone number above and the voicemail does not identify the agency I am calling.   No patient information was left on the voicemail.   Asked Tania to call back to speak with a nurse.     Martha JEFFRIESN, RN

## 2024-06-04 ENCOUNTER — TELEPHONE (OUTPATIENT)
Dept: FAMILY MEDICINE | Facility: CLINIC | Age: 79
End: 2024-06-04
Payer: MEDICARE

## 2024-06-04 NOTE — TELEPHONE ENCOUNTER
Incoming call from Tania. Relayed message to Tania from provider. Clarified that home health is for would assessment and care. She verbalized understanding.     Thank you - Mandi Hendrickson, BSN, RN

## 2024-06-04 NOTE — TELEPHONE ENCOUNTER
This writer attempted to contact Tania at Home [facility].  Left message to call back to the clinic.      If Tania calls back:    Please relay provider's message below.      Kristina Kjellberg, MSN, RN

## 2024-06-04 NOTE — TELEPHONE ENCOUNTER
Provider:  JUAN Marin is reporting that [facility] has accepted Rian for home health services for wound care. They will notify him of the start of care and they will go from there. Thank you. Laura Benoit R.N.

## 2024-06-05 ENCOUNTER — TELEPHONE (OUTPATIENT)
Dept: FAMILY MEDICINE | Facility: CLINIC | Age: 79
End: 2024-06-05

## 2024-06-05 ENCOUNTER — OFFICE VISIT (OUTPATIENT)
Dept: FAMILY MEDICINE | Facility: CLINIC | Age: 79
End: 2024-06-05
Payer: MEDICARE

## 2024-06-05 VITALS
WEIGHT: 206 LBS | TEMPERATURE: 97.6 F | HEART RATE: 100 BPM | DIASTOLIC BLOOD PRESSURE: 75 MMHG | OXYGEN SATURATION: 97 % | SYSTOLIC BLOOD PRESSURE: 126 MMHG | HEIGHT: 67 IN | RESPIRATION RATE: 20 BRPM | BODY MASS INDEX: 32.33 KG/M2

## 2024-06-05 DIAGNOSIS — L03.115 CELLULITIS OF RIGHT LEG: Primary | ICD-10-CM

## 2024-06-05 DIAGNOSIS — R60.0 PERIPHERAL EDEMA: ICD-10-CM

## 2024-06-05 PROCEDURE — 99213 OFFICE O/P EST LOW 20 MIN: CPT | Performed by: FAMILY MEDICINE

## 2024-06-05 PROCEDURE — G2211 COMPLEX E/M VISIT ADD ON: HCPCS | Performed by: FAMILY MEDICINE

## 2024-06-05 RX ORDER — RESPIRATORY SYNCYTIAL VIRUS VACCINE 120MCG/0.5
0.5 KIT INTRAMUSCULAR ONCE
Qty: 1 EACH | Refills: 0 | Status: CANCELLED | OUTPATIENT
Start: 2024-06-05 | End: 2024-06-05

## 2024-06-05 ASSESSMENT — PAIN SCALES - GENERAL: PAINLEVEL: NO PAIN (0)

## 2024-06-05 NOTE — TELEPHONE ENCOUNTER
Anne RN with [facility] called back to clinic. Received previous message and confirmed that home care is able to draw an INR level on patient and will report value to Anticoagulation clinic at [phone].      Marissa Marc RN  Clinical Triage/Primary Care  [facility]

## 2024-06-05 NOTE — PROGRESS NOTES
Assessment & Plan     (L03.115) Cellulitis of right leg  (primary encounter diagnosis)  Comment: minimal improvement  Plan: stressed again to pt that he needs to elevate leg and remain off of it.  Should avoid driving and cannot mow his lawn.  Dressing changed, will confirm home care will see him this week and assess for wound management.      (R60.0) Peripheral edema  Comment: improving  Plan: continue furosemide and elevation.  Once wounds heal, return to compression stockings.    The longitudinal plan of care for the diagnosis(es)/condition(s) as documented were addressed during this visit. Due to the added complexity in care, I will continue to support Rian in the subsequent management and with ongoing continuity of care.                 Rose Modi is a 78 year old, presenting for the following health issues:  WOUND CARE (Right leg)      6/5/2024    11:32 AM   Additional Questions   Roomed by kishor   Accompanied by self         6/5/2024    11:32 AM   Patient Reported Additional Medications   Patient reports taking the following new medications see chart     History of Present Illness       Reason for visit:  Wound care    He eats 0-1 servings of fruits and vegetables daily.He consumes 3 sweetened beverage(s) daily.He exercises with enough effort to increase his heart rate 9 or less minutes per day.  He is missing 1 dose(s) of medications per week.  He is not taking prescribed medications regularly due to remembering to take.     Returns for dressing change.  Has continued antibiotic.  Continues to have pain in leg though slowly improving.      Continues to have swelling in lower extremities, right is worse than left.  Pt has not been resting as instructed.  Wondering if he can mow the lawn on a riding mower.    Home care has not scheduled yet.  Needs to be at home, not driving until wounds improve and pain is resolved.                Review of Systems  Constitutional, HEENT, cardiovascular,  "pulmonary, gi and gu systems are negative, except as otherwise noted.      Objective    /75   Pulse 100   Temp 97.6  F (36.4  C) (Oral)   Resp 20   Ht 1.689 m (5' 6.5\")   Wt 93.4 kg (206 lb)   SpO2 97%   BMI 32.75 kg/m    Body mass index is 32.75 kg/m .  Physical Exam   GENERAL: alert and no distress  MS: right LE with minimal change from 6/3/2024, still with erythema and open areas.  Significant venous varicosities of foot and ankle.  Erythema slightly improved, now heel to distal 1/3 of medial lower extremity.  3 areas of skin breakdown. Some fibrinous coating of medial malleolar wounds.  PSYCH: mentation appears normal, affect normal/bright            Signed Electronically by: Junie Golden MD    "

## 2024-06-05 NOTE — TELEPHONE ENCOUNTER
Please clarify, pt needs to be seen on 6/7/2024 at home.  Clinic visit cancelled and if possible, they should draw his INR and send results to anticoag clinic.    Home Care should initiate care no later than 6/7/2024

## 2024-06-05 NOTE — TELEPHONE ENCOUNTER
Home Care is calling regarding an established patient with [facility].       Requesting orders from: Junie Golden  Provider is following patient: Yes  Is this a 60-day recertification request?  No    Orders Requested    Skilled Nursing  Request for delay in care, service is not able to be provided within same scheduled day.   Care to begin on 6/7/24, pt rescheduled today.    Information was gathered and will be sent to provider for review.  RN will contact Home Care with information after provider review.  Confirmed ok to leave a detailed message with call back.  Contact information confirmed and updated as needed.    Mandi Hendrickson RN

## 2024-06-05 NOTE — TELEPHONE ENCOUNTER
Called TONY Rodríguez - left a VM with Anne to begin care no later than 6/7 and requesting INR draw. Requested a call back to [phone].     Thank you - NESHA NavarroN, RN

## 2024-06-06 ENCOUNTER — MEDICAL CORRESPONDENCE (OUTPATIENT)
Dept: HEALTH INFORMATION MANAGEMENT | Facility: CLINIC | Age: 79
End: 2024-06-06

## 2024-06-07 ENCOUNTER — ANTICOAGULATION THERAPY VISIT (OUTPATIENT)
Dept: ANTICOAGULATION | Facility: CLINIC | Age: 79
End: 2024-06-07

## 2024-06-07 ENCOUNTER — MEDICAL CORRESPONDENCE (OUTPATIENT)
Dept: HEALTH INFORMATION MANAGEMENT | Facility: CLINIC | Age: 79
End: 2024-06-07

## 2024-06-07 ENCOUNTER — TELEPHONE (OUTPATIENT)
Dept: FAMILY MEDICINE | Facility: CLINIC | Age: 79
End: 2024-06-07

## 2024-06-07 DIAGNOSIS — I48.19 PERSISTENT ATRIAL FIBRILLATION (H): ICD-10-CM

## 2024-06-07 DIAGNOSIS — I48.0 PAROXYSMAL ATRIAL FIBRILLATION (H): Primary | ICD-10-CM

## 2024-06-07 LAB — INR (EXTERNAL): 2.8 (ref 0.9–1.1)

## 2024-06-07 NOTE — TELEPHONE ENCOUNTER
[facility] RN is calling      Home Care is calling regarding an established patient with [facility].       Requesting orders from: Junie Golden  Provider is following patient: Yes  Is this a 60-day recertification request?  No    Orders Requested    Skilled Nursing for dressing changes.  Medical Assistance will not pay for 3x/wk visits.    Request for initial certification (first set of orders)   Frequency:  2x/wk for 4 wks  then 1x/wk for 4 wks      Confirmed ok to leave a detailed message with call back.  Contact information confirmed and updated as needed.    Bekah Lucas RN

## 2024-06-07 NOTE — TELEPHONE ENCOUNTER
Called Mat, and read providers note. Understands message and verbalizes good understanding of plan of care.    Linda Sam RN

## 2024-06-07 NOTE — PROGRESS NOTES
ANTICOAGULATION MANAGEMENT     Rian Ness 78 year old male is on warfarin with therapeutic INR result. (Goal INR 2.0-3.0)    Recent labs: (last 7 days)     06/07/24  1035   INR 2.8*       ASSESSMENT     Source(s): Chart Review and Home Care/Facility Nurse     Warfarin doses taken: Warfarin taken as instructed  Diet: No new diet changes identified  Medication/supplement changes: Patient will complete Levaquin today  New illness, injury, or hospitalization: Yes: currently being treated for cellulitis of right leg  Signs or symptoms of bleeding or clotting: No  Previous result: Therapeutic last 2(+) visits  Additional findings: None       PLAN     Recommended plan for temporary change(s) affecting INR     Dosing Instructions: Continue your current warfarin dose with next INR in 5 days       Summary  As of 6/7/2024      Full warfarin instructions:  7.5 mg every Tue; 5 mg all other days   Next INR check:  6/12/2024               Telephone call with Mat home care nurse who verbalizes understanding and agrees to plan    Orders given to  Homecare nurse/facility to recheck    Education provided:   Please call back if any changes to your diet, medications or how you've been taking warfarin  Symptom monitoring: monitoring for bleeding signs and symptoms, monitoring for clotting signs and symptoms, and when to seek medical attention/emergency care  Contact [phone]  with any changes, questions or concerns.     Plan made per [facility] anticoagulation protocol    Azul Macias RN  Anticoagulation Clinic  6/7/2024    _______________________________________________________________________     Anticoagulation Episode Summary       Current INR goal:  2.0-3.0   TTR:  73.7% (1 y)   Target end date:  Indefinite   Send INR reminders to:  LATIA ULLOA    Indications    Paroxysmal atrial fibrillation (H) [I48.0]  Persistent atrial fibrillation (H) [I48.19]             Comments:  [facility]             Anticoagulation Care  Providers       Provider Role Specialty Phone number    Junie Golden MD Referring Family Medicine [phone]

## 2024-06-12 ENCOUNTER — ANTICOAGULATION THERAPY VISIT (OUTPATIENT)
Dept: ANTICOAGULATION | Facility: CLINIC | Age: 79
End: 2024-06-12
Payer: MEDICARE

## 2024-06-12 DIAGNOSIS — I48.19 PERSISTENT ATRIAL FIBRILLATION (H): ICD-10-CM

## 2024-06-12 DIAGNOSIS — I48.0 PAROXYSMAL ATRIAL FIBRILLATION (H): Primary | ICD-10-CM

## 2024-06-12 LAB — INR (EXTERNAL): 2.7 (ref 0.9–1.1)

## 2024-06-12 NOTE — PROGRESS NOTES
ANTICOAGULATION MANAGEMENT     Rian Ness 78 year old male is on warfarin with therapeutic INR result. (Goal INR 2.0-3.0)    Recent labs: (last 7 days)     06/12/24  0000   INR 2.7*       ASSESSMENT     Source(s): Chart Review and Home Care/Facility Nurse [facility] [phone]    Warfarin doses taken: Warfarin taken as instructed  Diet: No new diet changes identified  Medication/supplement changes: None noted  New illness, injury, or hospitalization: No  Signs or symptoms of bleeding or clotting: No  Previous result: Therapeutic last 2(+) visits  Additional findings: None       PLAN     Recommended plan for no diet, medication or health factor changes affecting INR     Dosing Instructions: Continue your current warfarin dose with next INR in 2 weeks       Summary  As of 6/12/2024      Full warfarin instructions:  7.5 mg every Tue; 5 mg all other days   Next INR check:  6/26/2024               Telephone call with [facility] home care nurse who verbalizes understanding and agrees to plan    Orders given to  Homecare nurse/facility to recheck    Education provided:   None required    Plan made per [facility] anticoagulation protocol    Taylor Pennington, RN  Anticoagulation Clinic  6/12/2024    _______________________________________________________________________     Anticoagulation Episode Summary       Current INR goal:  2.0-3.0   TTR:  75.0% (1 y)   Target end date:  Indefinite   Send INR reminders to:  LATIA ULLOA    Indications    Paroxysmal atrial fibrillation (H) [I48.0]  Persistent atrial fibrillation (H) [I48.19]             Comments:  [facility]             Anticoagulation Care Providers       Provider Role Specialty Phone number    Junie Golden MD Referring Family Medicine [phone]

## 2024-06-19 ENCOUNTER — TELEPHONE (OUTPATIENT)
Dept: FAMILY MEDICINE | Facility: CLINIC | Age: 79
End: 2024-06-19
Payer: MEDICARE

## 2024-06-19 ENCOUNTER — MEDICAL CORRESPONDENCE (OUTPATIENT)
Dept: HEALTH INFORMATION MANAGEMENT | Facility: CLINIC | Age: 79
End: 2024-06-19
Payer: MEDICARE

## 2024-06-19 NOTE — TELEPHONE ENCOUNTER
Reason for Call:  Form, our goal is to have forms completed with 72 hours, however, some forms may require a visit or additional information.    Type of letter, form or note:  Home Health Certification    Who is the form from?: Home care/[facility]     Where did the form come from: form was faxed in    What clinic location was the form placed at?: [place]    Where the form was placed:  Dr. Golden's Box/Folder    What number is listed as a contact on the form?: [phone]       Additional comments: Please route back to TC's when completed.  Thank you!    Call taken on 6/19/2024 at 11:03 AM by Letty Pozo MA

## 2024-06-24 DIAGNOSIS — Z53.9 DIAGNOSIS NOT YET DEFINED: Primary | ICD-10-CM

## 2024-06-24 PROCEDURE — G0180 MD CERTIFICATION HHA PATIENT: HCPCS | Performed by: FAMILY MEDICINE

## 2024-06-24 NOTE — TELEPHONE ENCOUNTER
Faxed C to [facility] and to stat scanning.Brianna Mandel [facility]

## 2024-06-25 ENCOUNTER — ANTICOAGULATION THERAPY VISIT (OUTPATIENT)
Dept: ANTICOAGULATION | Facility: CLINIC | Age: 79
End: 2024-06-25
Payer: MEDICARE

## 2024-06-25 DIAGNOSIS — I48.0 PAROXYSMAL ATRIAL FIBRILLATION (H): Primary | ICD-10-CM

## 2024-06-25 DIAGNOSIS — I48.19 PERSISTENT ATRIAL FIBRILLATION (H): ICD-10-CM

## 2024-06-25 LAB — INR (EXTERNAL): 2.5

## 2024-06-25 NOTE — PROGRESS NOTES
ANTICOAGULATION MANAGEMENT     Rian Ness 78 year old male is on warfarin with therapeutic INR result. (Goal INR 2.0-3.0)    Recent labs: (last 7 days)     06/25/24  1430   INR 2.5       ASSESSMENT     Source(s): Chart Review and Home Care/Facility Nurse     Warfarin doses taken: Warfarin taken as instructed  Diet: No new diet changes identified  Medication/supplement changes: None noted  New illness, injury, or hospitalization: No  Signs or symptoms of bleeding or clotting: No  Previous result: Therapeutic last 2(+) visits  Additional findings:  Home Carevisit every Tues and Friday for wound care       PLAN     Recommended plan for no diet, medication or health factor changes affecting INR     Dosing Instructions: Continue your current warfarin dose with next INR in 2 weeks       Summary  As of 6/25/2024      Full warfarin instructions:  7.5 mg every Tue; 5 mg all other days   Next INR check:  7/9/2024               Telephone call with [facility] home care nurse who verbalizes understanding and agrees to plan    Orders given to  Homecare nurse/facility to recheck    Education provided:   Contact [phone]  with any changes, questions or concerns.     Plan made per [facility] anticoagulation protocol    Azul Shen RN  Anticoagulation Clinic  6/25/2024    _______________________________________________________________________     Anticoagulation Episode Summary       Current INR goal:  2.0-3.0   TTR:  78.6% (1 y)   Target end date:  Indefinite   Send INR reminders to:  JENNY LAILA    Indications    Paroxysmal atrial fibrillation (H) [I48.0]  Persistent atrial fibrillation (H) [I48.19]             Comments:  [facility]             Anticoagulation Care Providers       Provider Role Specialty Phone number    Junie Golden MD Referring Family Medicine [phone]

## 2024-06-26 ENCOUNTER — MEDICAL CORRESPONDENCE (OUTPATIENT)
Dept: HEALTH INFORMATION MANAGEMENT | Facility: CLINIC | Age: 79
End: 2024-06-26
Payer: MEDICARE

## 2024-07-09 ENCOUNTER — ANTICOAGULATION THERAPY VISIT (OUTPATIENT)
Dept: ANTICOAGULATION | Facility: CLINIC | Age: 79
End: 2024-07-09
Payer: MEDICARE

## 2024-07-09 DIAGNOSIS — I48.19 PERSISTENT ATRIAL FIBRILLATION (H): ICD-10-CM

## 2024-07-09 DIAGNOSIS — I48.0 PAROXYSMAL ATRIAL FIBRILLATION (H): Primary | ICD-10-CM

## 2024-07-09 LAB — INR (EXTERNAL): 2.6 (ref 0.9–1.1)

## 2024-07-09 NOTE — PROGRESS NOTES
ANTICOAGULATION MANAGEMENT     Rian Ness 78 year old male is on warfarin with therapeutic INR result. (Goal INR 2.0-3.0)    Recent labs: (last 7 days)     07/09/24  1346   INR 2.6*       ASSESSMENT     Source(s): Chart Review and Home Care/Facility Nurse     Warfarin doses taken: Warfarin taken as instructed  Diet: No new diet changes identified  Medication/supplement changes: None noted  New illness, injury, or hospitalization: No  Signs or symptoms of bleeding or clotting: No  Previous result: Therapeutic last 2(+) visits  Additional findings: None       PLAN     Recommended plan for no diet, medication or health factor changes affecting INR     Dosing Instructions: Continue your current warfarin dose with next INR in 2 weeks       Summary  As of 7/9/2024      Full warfarin instructions:  7.5 mg every Tue; 5 mg all other days   Next INR check:  7/23/2024               Detailed voice message left for [facility] home care nurse with dosing instructions and follow up date.     Orders given to  Homecare nurse/facility to recheck    Education provided: Please call back if any changes to your diet, medications or how you've been taking warfarin    Plan made per [facility] anticoagulation protocol    Josette Sheffield, RN  Anticoagulation Clinic  7/9/2024    _______________________________________________________________________     Anticoagulation Episode Summary       Current INR goal:  2.0-3.0   TTR:  80.7% (1 y)   Target end date:  Indefinite   Send INR reminders to:  JENNY KASDIOGO    Indications    Paroxysmal atrial fibrillation (H) [I48.0]  Persistent atrial fibrillation (H) [I48.19]             Comments:  [facility]             Anticoagulation Care Providers       Provider Role Specialty Phone number    Junie Golden MD Referring Family Medicine [phone]

## 2024-07-10 ENCOUNTER — MEDICAL CORRESPONDENCE (OUTPATIENT)
Dept: HEALTH INFORMATION MANAGEMENT | Facility: CLINIC | Age: 79
End: 2024-07-10
Payer: MEDICARE

## 2024-07-10 ENCOUNTER — TELEPHONE (OUTPATIENT)
Dept: FAMILY MEDICINE | Facility: CLINIC | Age: 79
End: 2024-07-10
Payer: MEDICARE

## 2024-07-10 NOTE — TELEPHONE ENCOUNTER
TONY Hernandez with [facility] is calling regarding an established patient with [facility].       Requesting orders from: Junie Golden  Provider is following patient: Yes  Is this a 60-day recertification request?  No    Orders Requested    Skilled Nursing  Request for continuation of care with increase in frequency  Frequency:  2x/wk for 4 wks  Ongoing wound care, increase from once weekly to twice weekly to get better wound management.        Information was gathered and will be sent to provider for review.  RN will contact Home Care with information after provider review.  Confirmed ok to leave a detailed message with call back.  Contact information confirmed and updated as needed.        Marissa Marc RN  Clinical Triage/Primary Care  [facility]

## 2024-07-16 NOTE — TELEPHONE ENCOUNTER
David RN, calling to check on status of order request below. RN advised that provider did approve the requested orders on 7/11/24 (as shown below). David indicates understanding of issues and agrees with the plan.    NESHA ForbesN, RN

## 2024-07-17 ENCOUNTER — MEDICAL CORRESPONDENCE (OUTPATIENT)
Dept: HEALTH INFORMATION MANAGEMENT | Facility: CLINIC | Age: 79
End: 2024-07-17
Payer: MEDICARE

## 2024-07-23 ENCOUNTER — ANTICOAGULATION THERAPY VISIT (OUTPATIENT)
Dept: ANTICOAGULATION | Facility: CLINIC | Age: 79
End: 2024-07-23
Payer: MEDICARE

## 2024-07-23 DIAGNOSIS — I48.0 PAROXYSMAL ATRIAL FIBRILLATION (H): Primary | ICD-10-CM

## 2024-07-23 DIAGNOSIS — I48.19 PERSISTENT ATRIAL FIBRILLATION (H): ICD-10-CM

## 2024-07-23 LAB — INR (EXTERNAL): 2.1 (ref 0.9–1.1)

## 2024-07-23 NOTE — PROGRESS NOTES
ANTICOAGULATION MANAGEMENT     Rian Ness 78 year old male is on warfarin with therapeutic INR result. (Goal INR 2.0-3.0)    Recent labs: (last 7 days)     07/23/24  1408   INR 2.1*       ASSESSMENT     Source(s): Chart Review and Home Care/Facility Nurse     Warfarin doses taken: Warfarin taken as instructed  Diet: No new diet changes identified  Medication/supplement changes: None noted  New illness, injury, or hospitalization: No  Signs or symptoms of bleeding or clotting: No  Previous result: Therapeutic last 2(+) visits  Additional findings: None       PLAN     Recommended plan for no diet, medication or health factor changes affecting INR     Dosing Instructions: Continue your current warfarin dose with next INR in 2 weeks       Summary  As of 7/23/2024      Full warfarin instructions:  7.5 mg every Tue; 5 mg all other days   Next INR check:  8/6/2024               Detailed voice message left for Irene Puri LPN home care nurse with dosing instructions and follow up date.     Orders given to  Homecare nurse/facility to recheck    Education provided: None required    Plan made per [facility] anticoagulation protocol    Danay Monte, RN  Anticoagulation Clinic  7/23/2024    _______________________________________________________________________     Anticoagulation Episode Summary       Current INR goal:  2.0-3.0   TTR:  84.6% (1 y)   Target end date:  Indefinite   Send INR reminders to:  LATIA ULLOA    Indications    Paroxysmal atrial fibrillation (H) [I48.0]  Persistent atrial fibrillation (H) [I48.19]             Comments:  [facility]             Anticoagulation Care Providers       Provider Role Specialty Phone number    Junie Golden MD Referring Family Medicine [phone]

## 2024-07-31 ENCOUNTER — ANCILLARY PROCEDURE (OUTPATIENT)
Dept: GENERAL RADIOLOGY | Facility: CLINIC | Age: 79
End: 2024-07-31
Attending: STUDENT IN AN ORGANIZED HEALTH CARE EDUCATION/TRAINING PROGRAM
Payer: MEDICARE

## 2024-07-31 ENCOUNTER — OFFICE VISIT (OUTPATIENT)
Dept: URGENT CARE | Facility: URGENT CARE | Age: 79
End: 2024-07-31
Payer: MEDICARE

## 2024-07-31 VITALS
OXYGEN SATURATION: 99 % | WEIGHT: 205 LBS | HEART RATE: 94 BPM | SYSTOLIC BLOOD PRESSURE: 121 MMHG | TEMPERATURE: 97 F | DIASTOLIC BLOOD PRESSURE: 75 MMHG | BODY MASS INDEX: 32.59 KG/M2

## 2024-07-31 DIAGNOSIS — M25.532 PAIN AND SWELLING OF LEFT WRIST: Primary | ICD-10-CM

## 2024-07-31 DIAGNOSIS — M25.432 PAIN AND SWELLING OF LEFT WRIST: ICD-10-CM

## 2024-07-31 DIAGNOSIS — M25.532 PAIN AND SWELLING OF LEFT WRIST: ICD-10-CM

## 2024-07-31 DIAGNOSIS — M25.432 PAIN AND SWELLING OF LEFT WRIST: Primary | ICD-10-CM

## 2024-07-31 PROCEDURE — 73110 X-RAY EXAM OF WRIST: CPT | Mod: TC | Performed by: RADIOLOGY

## 2024-07-31 PROCEDURE — 99213 OFFICE O/P EST LOW 20 MIN: CPT | Performed by: STUDENT IN AN ORGANIZED HEALTH CARE EDUCATION/TRAINING PROGRAM

## 2024-07-31 ASSESSMENT — PAIN SCALES - GENERAL: PAINLEVEL: MODERATE PAIN (4)

## 2024-07-31 NOTE — PATIENT INSTRUCTIONS
Put a pillow under your left wrist when you're sleeping  Use anti inflammatory cream up to 4x/day  Ice twice a day  Wear the brace anytime you're doing activity with your hands (like cooking, vacuuming, etc)

## 2024-07-31 NOTE — PROGRESS NOTES
Assessment & Plan     Pain and swelling of left wrist  - XR Wrist Left G/E 3 Views  - diclofenac (VOLTAREN) 1 % topical gel  Dispense: 50 g; Refill: 0  - Wrist/Arm/Hand Bracking Supplies Order Wrist Brace; Left; non-thumb spica    3 days of left wrist and pain after working on a machine repair.  X-ray negative for fracture, + arthritis.  He has no history of gout, negative Tinel's test. Given age, unlikely first presentation of autoimmune disease. He is on warfarin so cannot rule out hemarthrosis however his last INR was wnl and I would expect decreased range of motion due to blood in the joint if this were the cause of his symptoms.  Most likely wrist sprain so we will treat with diclofenac gel, elevation overnight, ice, with physical activity.  Return precautions with warning and he was understanding of plan at the time of discharge.    Return in about 1 week (around 8/7/2024) for In clinic with your primary care provider.    Ivette Davies, DO  she/her  [facility] URGENT CARE    Subjective     Rian ZELAYA Grover is a 78 year old male who presents to clinic today for the following health issues:    HPI    MS Injury/Pain    Onset of symptoms was 3 day(s) ago.  Location: left wrist and hand  No injury  After working machinery in the garage, was taking a nap in recliner with arm outstretched and when he woke up a few hours later, noticed he had a hard time moving his fingers and had pain through his wrist. Fingers started to wake up but hurt  Woke up later that night from wrist throbbing, felt better after walking around the house a few times with his arm down by his side. Has had to do this the last three nights  Has  h/o arthritis   Tried: nothing  On warfarin, last INR per chart review wnl  This is the first time this type of problem has occurred for this patient.     Past Medical History:   Diagnosis Date    Arthritis     Cancer (H)     Depressive disorder     HTN      No Known Allergies  Current Outpatient  Medications   Medication Sig Dispense Refill    DAILY MULTIVITAMIN PO 1 tab daily      diclofenac (VOLTAREN) 1 % topical gel Apply 2 g topically 4 times daily 50 g 0    furosemide (LASIX) 20 MG tablet 1 tab in AM as needed for leg swelling. Eat potatoes or extra fruits/veggies for potassium 90 tablet 1    levofloxacin (LEVAQUIN) 500 MG tablet Take 1 tablet (500 mg) by mouth daily To complete 10 days of treatment 3 tablet 0    loratadine (CLARITIN) 10 MG tablet Take 1 tablet (10 mg) by mouth daily 90 tablet 3    MAGNESIUM OR 1 tablet daily      metoprolol succinate ER (TOPROL XL) 100 MG 24 hr tablet Take 1 tablet (100 mg) by mouth daily 90 tablet 1    omeprazole (PRILOSEC) 20 MG DR capsule TAKE 1 CAPSULE TWICE DAILY 180 capsule 3    warfarin ANTICOAGULANT (COUMADIN) 5 MG tablet TAKE 1 AND 1/2 TABLETS ON TUESDAYS/Saturdays,  AND 1 TABLET ALL OTHER DAYS OF THE WEEK OR AS DIRECTED 100 tablet 10     No current facility-administered medications for this visit.      Review of Systems  Constitutional, HEENT, cardiovascular, pulmonary, gi and gu systems are negative, except as otherwise noted.      Objective    /75   Pulse 94   Temp 97  F (36.1  C) (Tympanic)   Wt 93 kg (205 lb)   SpO2 99%   BMI 32.59 kg/m      Physical Exam  Vitals reviewed.   Constitutional:       Appearance: Normal appearance.   Eyes:      Pupils: Pupils are equal, round, and reactive to light.   Cardiovascular:      Rate and Rhythm: Normal rate.   Pulmonary:      Effort: Pulmonary effort is normal.   Musculoskeletal:      Right elbow: Normal.      Left elbow: Normal. No swelling. Normal range of motion.      Right forearm: Normal.      Left forearm: Normal. No swelling, deformity or bony tenderness.      Left wrist: Swelling (mild, without erythema) and bony tenderness (across entire wrist joint) present. No lacerations, snuff box tenderness or crepitus. Normal range of motion.      Right hand: Normal.      Left hand: No swelling or bony  tenderness. Normal range of motion. Normal strength. Normal sensation.   Neurological:      Mental Status: He is alert and oriented to person, place, and time.            XR Wrist Left G/E 3 Views    Result Date: 7/31/2024  XR WRIST LEFT G/E 3 VIEWS 7/31/2024 10:44 AM HISTORY: 3d swelling and pain of left wrist; Pain and swelling of left wrist; Pain and swelling of left wrist COMPARISON: None.     IMPRESSION: Degenerative changes in the STT joint and first CMC joint. No evidence for fracture. Normal carpal alignment. PANCHO CAMACHO MD   SYSTEM ID:  [id number]         The use of Dragon/Kingsoftation services may have been used to construct the content in this note; any grammatical or spelling errors are non-intentional. Please contact the author of this note directly if you are in need of any clarification.DME (Durable Medical Equipment) Orders and Documentation  Orders Placed This Encounter   Procedures    Wrist/Arm/Hand Bracking Supplies Order Wrist Brace; Left; non-thumb spica        The patient was assessed and it was determined the patient is in need of the following listed DME Supplies/Equipment. Please complete supporting documentation below to demonstrate medical necessity.

## 2024-08-02 ENCOUNTER — TELEPHONE (OUTPATIENT)
Dept: FAMILY MEDICINE | Facility: CLINIC | Age: 79
End: 2024-08-02
Payer: MEDICARE

## 2024-08-02 NOTE — TELEPHONE ENCOUNTER
Home Care is calling regarding an established patient with [facility].       Requesting orders from: Junie Golden  Provider is following patient: Yes  Is this a 60-day recertification request?  Yes    Orders Requested    Skilled Nursing  Request for recertification   Frequency:  2x/wk for 9 wks for management of cellulitis and RLE wound    Information was gathered and will be sent to provider for review.  RN will contact Home Care with information after provider review.  Contact information confirmed and updated as needed.    Josette Ortega RN

## 2024-08-02 NOTE — TELEPHONE ENCOUNTER
RN returned call to TONY Self, from [facility] and relayed Dr. Queen's approval of the following requested orders, on behalf of Dr. Golden:    Skilled Nursing  Request for recertification   Frequency:  2x/wk for 9 wks for management of cellulitis and RLE wound    JESI Forbes, RN

## 2024-08-06 ENCOUNTER — MEDICAL CORRESPONDENCE (OUTPATIENT)
Dept: HEALTH INFORMATION MANAGEMENT | Facility: CLINIC | Age: 79
End: 2024-08-06

## 2024-08-07 ENCOUNTER — TELEPHONE (OUTPATIENT)
Dept: ANTICOAGULATION | Facility: CLINIC | Age: 79
End: 2024-08-07
Payer: MEDICARE

## 2024-08-07 NOTE — TELEPHONE ENCOUNTER
ANTICOAGULATION     Rian Ness is overdue for an INR check.     Called Jaxson, home care nurse, at [phone] to discuss INR check.  Left message to call back Anticoagulation clinic to discuss INR appt.    Mariana Reese RN

## 2024-08-08 ENCOUNTER — ANTICOAGULATION THERAPY VISIT (OUTPATIENT)
Dept: ANTICOAGULATION | Facility: CLINIC | Age: 79
End: 2024-08-08
Payer: MEDICARE

## 2024-08-08 DIAGNOSIS — I48.0 PAROXYSMAL ATRIAL FIBRILLATION (H): Primary | ICD-10-CM

## 2024-08-08 DIAGNOSIS — I48.19 PERSISTENT ATRIAL FIBRILLATION (H): ICD-10-CM

## 2024-08-08 LAB — INR (EXTERNAL): 2.6 (ref 0.9–1.1)

## 2024-08-08 NOTE — PROGRESS NOTES
ANTICOAGULATION MANAGEMENT     Rian Ness 78 year old male is on warfarin with therapeutic INR result. (Goal INR 2.0-3.0)    Recent labs: (last 7 days)     08/08/24  1106   INR 2.6*       ASSESSMENT     Source(s): Chart Review and Home Care/Facility Nurse     Warfarin doses taken: Warfarin taken as instructed  Diet: No new diet changes identified  Medication/supplement changes: None noted  New illness, injury, or hospitalization: No  Signs or symptoms of bleeding or clotting: No  Previous result: Therapeutic last 2(+) visits  Additional findings: None       PLAN     Recommended plan for no diet, medication or health factor changes affecting INR     Dosing Instructions: Continue your current warfarin dose with next INR in 2 weeks       Summary  As of 8/8/2024      Full warfarin instructions:  7.5 mg every Tue; 5 mg all other days   Next INR check:  8/20/2024               Telephone call with [facility] home care nurse who verbalizes understanding and agrees to plan    Orders given to  Homecare nurse/facility to recheck    Education provided: None required    Plan made per [facility] anticoagulation protocol    Danay Monte RN  Anticoagulation Clinic  8/8/2024    _______________________________________________________________________     Anticoagulation Episode Summary       Current INR goal:  2.0-3.0   TTR:  85.1% (1 y)   Target end date:  Indefinite   Send INR reminders to:  LATIA ULLOA    Indications    Paroxysmal atrial fibrillation (H) [I48.0]  Persistent atrial fibrillation (H) [I48.19]             Comments:  [facility] Care             Anticoagulation Care Providers       Provider Role Specialty Phone number    Junie Golden MD Referring Family Medicine [phone]

## 2024-08-12 ENCOUNTER — TELEPHONE (OUTPATIENT)
Dept: FAMILY MEDICINE | Facility: CLINIC | Age: 79
End: 2024-08-12
Payer: MEDICARE

## 2024-08-12 NOTE — TELEPHONE ENCOUNTER
Signed [facility] forms faxed to [employer], [place] ; [employer] confirmed.  Originals placed in brown folder.  Letty SHAH    [facility]

## 2024-08-12 NOTE — TELEPHONE ENCOUNTER
Reason for Call:  Form, our goal is to have forms completed with 72 hours, however, some forms may require a visit or additional information.    Type of letter, form or note:  Home Health Certification    Who is the form from?: Home care/[facility]    Where did the form come from: form was faxed in    What clinic location was the form placed at?: [place]    Where the form was placed:  Dr. Golden's Box/Folder    What number is listed as a contact on the form?: [phone]       Additional comments: Once completed, please route back to TC's.  Thank you!    Call taken on 8/12/2024 at 5:25 PM by Letty Pozo MA

## 2024-08-20 ENCOUNTER — ANTICOAGULATION THERAPY VISIT (OUTPATIENT)
Dept: ANTICOAGULATION | Facility: CLINIC | Age: 79
End: 2024-08-20
Payer: MEDICARE

## 2024-08-20 DIAGNOSIS — I48.0 PAROXYSMAL ATRIAL FIBRILLATION (H): Primary | ICD-10-CM

## 2024-08-20 DIAGNOSIS — I48.0 PAF (PAROXYSMAL ATRIAL FIBRILLATION) (H): ICD-10-CM

## 2024-08-20 DIAGNOSIS — I48.19 PERSISTENT ATRIAL FIBRILLATION (H): ICD-10-CM

## 2024-08-20 LAB — INR (EXTERNAL): 2.3 (ref 0.9–1.1)

## 2024-08-20 RX ORDER — METOPROLOL SUCCINATE 100 MG/1
100 TABLET, EXTENDED RELEASE ORAL DAILY
Qty: 90 TABLET | Refills: 2 | Status: SHIPPED | OUTPATIENT
Start: 2024-08-20

## 2024-08-20 NOTE — PROGRESS NOTES
ANTICOAGULATION MANAGEMENT     Rian Ness 78 year old male is on warfarin with therapeutic INR result. (Goal INR 2.0-3.0)    Recent labs: (last 7 days)     08/20/24  1445   INR 2.3*       ASSESSMENT     Source(s): Chart Review and Home Care/Facility Nurse     Warfarin doses taken: Warfarin taken as instructed  Diet: No new diet changes identified  Medication/supplement changes: None noted  New illness, injury, or hospitalization: No  Signs or symptoms of bleeding or clotting: No  Previous result: Therapeutic last 2(+) visits  Additional findings: None       PLAN     Recommended plan for no diet, medication or health factor changes affecting INR     Dosing Instructions: Continue your current warfarin dose with next INR in 3 weeks       Summary  As of 8/20/2024      Full warfarin instructions:  7.5 mg every Tue; 5 mg all other days   Next INR check:  9/3/2024               Telephone call with [place] home care nurse who verbalizes understanding and agrees to plan    Orders given to  Homecare nurse/facility to recheck    Education provided: Please call back if any changes to your diet, medications or how you've been taking warfarin  Advised if anything changes may need sooner check and home care should update Anticoagulation clinic but if stable, okay to recheck in 3 weeks.     Plan made per [facility] anticoagulation protocol    Mariana Reese RN  Anticoagulation Clinic  8/20/2024    _______________________________________________________________________     Anticoagulation Episode Summary       Current INR goal:  2.0-3.0   TTR:  85.2% (1 y)   Target end date:  Indefinite   Send INR reminders to:  [facility]    Indications    Paroxysmal atrial fibrillation (H) [I48.0]  Persistent atrial fibrillation (H) [I48.19]             Comments:  [facility] Care             Anticoagulation Care Providers       Provider Role Specialty Phone number    Junie Golden MD Referring Family Medicine [phone]

## 2024-08-21 ENCOUNTER — MEDICAL CORRESPONDENCE (OUTPATIENT)
Dept: HEALTH INFORMATION MANAGEMENT | Facility: CLINIC | Age: 79
End: 2024-08-21

## 2024-09-10 ENCOUNTER — ANTICOAGULATION THERAPY VISIT (OUTPATIENT)
Dept: ANTICOAGULATION | Facility: CLINIC | Age: 79
End: 2024-09-10
Payer: MEDICARE

## 2024-09-10 DIAGNOSIS — I48.0 PAROXYSMAL ATRIAL FIBRILLATION (H): Primary | ICD-10-CM

## 2024-09-10 DIAGNOSIS — I48.19 PERSISTENT ATRIAL FIBRILLATION (H): ICD-10-CM

## 2024-09-10 LAB — INR (EXTERNAL): 2.1 (ref 0.9–1.1)

## 2024-09-10 NOTE — PROGRESS NOTES
ANTICOAGULATION MANAGEMENT     Rian Ness 78 year old male is on warfarin with therapeutic INR result. (Goal INR 2.0-3.0)    Recent labs: (last 7 days)     09/10/24  1354   INR 2.1*       ASSESSMENT     Source(s): Chart Review and Home Care/Facility Nurse     Warfarin doses taken: Warfarin taken as instructed  Diet: No new diet changes identified  Medication/supplement changes: None noted  New illness, injury, or hospitalization: No  Signs or symptoms of bleeding or clotting: No  Previous result: Therapeutic last 2(+) visits  Additional findings: None       PLAN     Recommended plan for no diet, medication or health factor changes affecting INR     Dosing Instructions: Continue your current warfarin dose with next INR in 3 weeks       Summary  As of 9/10/2024      Full warfarin instructions:  7.5 mg every Tue; 5 mg all other days   Next INR check:  10/2/2024               Telephone call with [facility] home care nurse who agrees to plan and repeated back plan correctly    Orders given to  Homecare nurse/facility to recheck    Education provided: Please call back if any changes to your diet, medications or how you've been taking warfarin    Plan made per [facility] anticoagulation protocol    Kat Vance RN  9/10/2024  Anticoagulation Clinic  [employer] for routing messages: silvana ULLOA  [facility] patient phone line: [phone]        _______________________________________________________________________     Anticoagulation Episode Summary       Current INR goal:  2.0-3.0   TTR:  85.1% (1 y)   Target end date:  Indefinite   Send INR reminders to:  LATIA ULLOA    Indications    Paroxysmal atrial fibrillation (H) [I48.0]  Persistent atrial fibrillation (H) [I48.19]             Comments:  [facility] Care             Anticoagulation Care Providers       Provider Role Specialty Phone number    Junie Golden MD Referring Family Medicine [phone]

## 2024-09-11 ENCOUNTER — MEDICAL CORRESPONDENCE (OUTPATIENT)
Dept: HEALTH INFORMATION MANAGEMENT | Facility: CLINIC | Age: 79
End: 2024-09-11
Payer: MEDICARE

## 2024-10-02 ENCOUNTER — MEDICAL CORRESPONDENCE (OUTPATIENT)
Dept: HEALTH INFORMATION MANAGEMENT | Facility: CLINIC | Age: 79
End: 2024-10-02
Payer: MEDICARE

## 2024-10-02 ENCOUNTER — ANTICOAGULATION THERAPY VISIT (OUTPATIENT)
Dept: ANTICOAGULATION | Facility: CLINIC | Age: 79
End: 2024-10-02
Payer: MEDICARE

## 2024-10-02 ENCOUNTER — TELEPHONE (OUTPATIENT)
Dept: FAMILY MEDICINE | Facility: CLINIC | Age: 79
End: 2024-10-02
Payer: MEDICARE

## 2024-10-02 DIAGNOSIS — I48.0 PAROXYSMAL ATRIAL FIBRILLATION (H): Primary | ICD-10-CM

## 2024-10-02 DIAGNOSIS — I48.19 PERSISTENT ATRIAL FIBRILLATION (H): ICD-10-CM

## 2024-10-02 LAB — INR (EXTERNAL): 2.1 (ref 0.9–1.1)

## 2024-10-02 NOTE — TELEPHONE ENCOUNTER
[place] calling back per request. Verbal orders given below per Dr. Golden for home care. No further question or concerns at this time.    Linda Sam RN

## 2024-10-02 NOTE — PROGRESS NOTES
ANTICOAGULATION MANAGEMENT     Rian Ness 78 year old male is on warfarin with therapeutic INR result. (Goal INR 2.0-3.0)    Recent labs: (last 7 days)     10/02/24  1452   INR 2.1*       ASSESSMENT     Source(s): Chart Review, Patient/Caregiver Call, and Home Care/Facility Nurse     Warfarin doses taken: Warfarin taken as instructed  Diet: No new diet changes identified  Medication/supplement changes: None noted  New illness, injury, or hospitalization: No  Signs or symptoms of bleeding or clotting: No  Previous result: Therapeutic last 2(+) visits  Additional findings:  Home care re-certification visit today       PLAN     Recommended plan for no diet, medication or health factor changes affecting INR     Dosing Instructions: Continue your current warfarin dose with next INR in 3 weeks       Summary  As of 10/2/2024      Full warfarin instructions:  7.5 mg every Tue; 5 mg all other days   Next INR check:  10/23/2024               Telephone call with [facility] home care nurse who verbalizes understanding and agrees to plan and who agrees to plan and repeated back plan correctly    Orders given to  Homecare nurse/facility to recheck    Education provided: Please call back if any changes to your diet, medications or how you've been taking warfarin  Goal range and lab monitoring: goal range and significance of current result    Plan made per [facility] anticoagulation protocol    Leni Kearney RN  10/2/2024  Anticoagulation Clinic  [employer] for routing messages: silvana ULLOA  [facility] patient phone line: [phone]        _______________________________________________________________________     Anticoagulation Episode Summary       Current INR goal:  2.0-3.0   TTR:  85.2% (1 y)   Target end date:  Indefinite   Send INR reminders to:  LATIA ULLOA    Indications    Paroxysmal atrial fibrillation (H) [I48.0]  Persistent atrial fibrillation (H) [I48.19]             Comments:  [facility]              Anticoagulation Care Providers       Provider Role Specialty Phone number    Junie Golden MD Referring Family Medicine [phone]

## 2024-10-02 NOTE — TELEPHONE ENCOUNTER
Returned call to TONY Christensen with [facility]. No identifying details or name on voicemail message prompt, unable to leave orders.  Instructed caller to return call back to [facility] at [phone] and speak with nursing team regarding patient with initials W.M.      If home care returns call, relay verbal approval from Dr. Golden. Document and close encounter.      Marissa Marc RN  Clinical Triage/Primary Care  [facility]

## 2024-10-02 NOTE — TELEPHONE ENCOUNTER
Home Care is calling regarding an established patient with  [facility].        10/2/2024     3:53 PM   Home Care Information   Date of Home Care episode start 8/12/2024   Current following provider Ch[place] Lamb   Date provider agreed to follow 8/12/2024   Home [facility] agency [facility]     Requesting orders from: Junie Golden  Provider is following patient: Yes  Is this a 60-day recertification request?  Yes    Orders Requested    Skilled Nursing  Request for recertification   Frequency:  1x/wk for 4 wks to monitor surgical site and do INR testing.      Information was gathered and will be sent to provider for review.  RN will contact Home Care with information after provider review.  Confirmed ok to leave a detailed message with call back.  Contact information confirmed and updated as needed.    Taylor Henderson RN

## 2024-10-03 ENCOUNTER — MEDICAL CORRESPONDENCE (OUTPATIENT)
Dept: HEALTH INFORMATION MANAGEMENT | Facility: CLINIC | Age: 79
End: 2024-10-03
Payer: MEDICARE

## 2024-10-05 ENCOUNTER — MEDICAL CORRESPONDENCE (OUTPATIENT)
Dept: HEALTH INFORMATION MANAGEMENT | Facility: CLINIC | Age: 79
End: 2024-10-05

## 2024-10-08 ENCOUNTER — ANTICOAGULATION THERAPY VISIT (OUTPATIENT)
Dept: ANTICOAGULATION | Facility: CLINIC | Age: 79
End: 2024-10-08
Payer: MEDICARE

## 2024-10-08 DIAGNOSIS — I48.19 PERSISTENT ATRIAL FIBRILLATION (H): ICD-10-CM

## 2024-10-08 DIAGNOSIS — I48.0 PAROXYSMAL ATRIAL FIBRILLATION (H): Primary | ICD-10-CM

## 2024-10-08 LAB — INR (EXTERNAL): 1.7

## 2024-10-08 NOTE — PROGRESS NOTES
ANTICOAGULATION MANAGEMENT     Rian Ness 78 year old male is on warfarin with subtherapeutic INR result. (Goal INR 2.0-3.0)    Recent labs: (last 7 days)     10/08/24  1638   INR 1.7       ASSESSMENT     Source(s): Chart Review and Home Care/Facility Nurse     Warfarin doses taken: Warfarin taken as instructed  Diet: No new diet changes identified  Medication/supplement changes:  Taking aspirin daily but not prescribed per home care  New illness, injury, or hospitalization: Yes: Hematoma on Leg/Seen yesterday per Home care.  Signs or symptoms of bleeding or clotting: Yes: Hematoma  Previous result: Therapeutic last 2(+) visits  Additional findings: None       PLAN     Recommended plan for temporary change(s) affecting INR     Dosing Instructions: Continue your current warfarin dose with next INR in 1 week       Summary  As of 10/8/2024      Full warfarin instructions:  7.5 mg every Tue; 5 mg all other days   Next INR check:                 Telephone call with [place] home care nurse who agrees to plan and repeated back plan correctly    Orders given to  Homecare nurse/facility to recheck    Education provided: Please call back if any changes to your diet, medications or how you've been taking warfarin    Plan made per [facility] anticoagulation protocol    Kat Vance RN  10/8/2024  Anticoagulation Clinic  [employer] for routing messages: silvana ULLOA  [facility] patient phone line: [phone]        _______________________________________________________________________     Anticoagulation Episode Summary       Current INR goal:  2.0-3.0   TTR:  83.9% (1 y)   Target end date:  Indefinite   Send INR reminders to:  LATIA ULLOA    Indications    Paroxysmal atrial fibrillation (H) [I48.0]  Persistent atrial fibrillation (H) [I48.19]             Comments:  [facility] Care             Anticoagulation Care Providers       Provider Role Specialty Phone number    Junie Golden MD Referring Family Medicine  [phone]

## 2024-10-09 ENCOUNTER — MEDICAL CORRESPONDENCE (OUTPATIENT)
Dept: HEALTH INFORMATION MANAGEMENT | Facility: CLINIC | Age: 79
End: 2024-10-09

## 2024-10-09 ENCOUNTER — TELEPHONE (OUTPATIENT)
Dept: FAMILY MEDICINE | Facility: CLINIC | Age: 79
End: 2024-10-09
Payer: MEDICARE

## 2024-10-09 DIAGNOSIS — Z53.9 DIAGNOSIS NOT YET DEFINED: Primary | ICD-10-CM

## 2024-10-09 PROCEDURE — G0179 MD RECERTIFICATION HHA PT: HCPCS | Performed by: FAMILY MEDICINE

## 2024-10-09 NOTE — TELEPHONE ENCOUNTER
Signed [facility] forms faxed to [employer], [facility], [employer] confirmed.  Originals placed in brown folder.  Letty SHAH    [facility]

## 2024-10-09 NOTE — TELEPHONE ENCOUNTER
Reason for Call:  Form, our goal is to have forms completed with 72 hours, however, some forms may require a visit or additional information.    Type of letter, form or note:  Home Health Certification    Who is the form from?: Home care/[facility]    Where did the form come from: form was faxed in    What clinic location was the form placed at?: [place]    Where the form was placed:  Dr. Golden's Box/Folder    What number is listed as a contact on the form?: [phone]       Additional comments: Please route back to TC's when completed.  Thank you!    Call taken on 10/9/2024 at 2:09 PM by Letty Pozo MA

## 2024-10-16 ENCOUNTER — ANTICOAGULATION THERAPY VISIT (OUTPATIENT)
Dept: ANTICOAGULATION | Facility: CLINIC | Age: 79
End: 2024-10-16
Payer: MEDICARE

## 2024-10-16 ENCOUNTER — TELEPHONE (OUTPATIENT)
Dept: ANTICOAGULATION | Facility: CLINIC | Age: 79
End: 2024-10-16
Payer: MEDICARE

## 2024-10-16 DIAGNOSIS — I48.0 PAROXYSMAL ATRIAL FIBRILLATION (H): Primary | ICD-10-CM

## 2024-10-16 DIAGNOSIS — I48.19 PERSISTENT ATRIAL FIBRILLATION (H): ICD-10-CM

## 2024-10-16 LAB — INR (EXTERNAL): 3.1 (ref 0.9–1.1)

## 2024-10-16 NOTE — PROGRESS NOTES
ANTICOAGULATION MANAGEMENT     Rian Ness 78 year old male is on warfarin with supratherapeutic INR result. (Goal INR 2.0-3.0)    Recent labs: (last 7 days)     10/16/24  1721   INR 3.1*       ASSESSMENT     Source(s): Chart Review and Home Care/Facility Nurse     Warfarin doses taken: Warfarin taken as instructed  Diet: No new diet changes identified  Medication/supplement changes: patient is taking tylenol for pain  New illness, injury, or hospitalization: No, Patient has hematoma still present on leg, reports that it's getting smaller  Signs or symptoms of bleeding or clotting: Yes: Hematoma on leg  Previous result: Subtherapeutic  Additional findings: None       PLAN     Recommended plan for no diet, medication or health factor changes affecting INR     Dosing Instructions: Continue your current warfarin dose with next INR in 1 week       Summary  As of 10/16/2024      Full warfarin instructions:  7.5 mg every Tue; 5 mg all other days   Next INR check:  10/23/2024               Telephone call with [place] home care nurse who verbalizes understanding and agrees to plan    Orders given to  Homecare nurse/facility to recheck    Education provided: Please call back if any changes to your diet, medications or how you've been taking warfarin    Plan made per [facility] anticoagulation protocol    Mariana Reese RN  10/16/2024  Anticoagulation Clinic  [employer] for routing messages: silvana ULLOA  [facility] patient phone line: [phone]        _______________________________________________________________________     Anticoagulation Episode Summary       Current INR goal:  2.0-3.0   TTR:  83.3% (1 y)   Target end date:  Indefinite   Send INR reminders to:  LATIA ULLOA    Indications    Paroxysmal atrial fibrillation (H) [I48.0]  Persistent atrial fibrillation (H) [I48.19]             Comments:  [facility] Care             Anticoagulation Care Providers       Provider Role Specialty Phone number    Junie Golden  MD [facility] Medicine [phone]

## 2024-10-16 NOTE — TELEPHONE ENCOUNTER
ANTICOAGULATION     Rian Ness is overdue for an INR check.     Spoke with [facility] and scheduled lab appointment on 10/16/24    Mariana Reese RN  10/16/2024  Anticoagulation Clinic  [facility] for routing messages: silvana ULLOA  [facility] patient phone line: [phone]

## 2024-10-18 ENCOUNTER — TELEPHONE (OUTPATIENT)
Dept: FAMILY MEDICINE | Facility: CLINIC | Age: 79
End: 2024-10-18
Payer: MEDICARE

## 2024-10-18 NOTE — TELEPHONE ENCOUNTER
Called and spoke with TONY Self from [employer].  Verbal approval given for requested orders, as noted and per Dr. Golden.  No further needs or requests at this time.        Marissa Marc RN  Clinical Triage/Primary Care  [facility]

## 2024-10-18 NOTE — TELEPHONE ENCOUNTER
Home Care is calling regarding an established patient with [facility].  {       10/2/2024     3:53 PM   Home Care Information   Date of Home Care episode start 8/12/2024   Current following provider [facility] Lamb   Date provider agreed to follow 8/12/2024   Home Care agency [facility]     Requesting orders from: Junie Golden  Provider is following patient: Yes  Is this a 60-day recertification request?  No    Orders Requested    Skilled Nursing  Request for continuation of care with increase in frequency  Frequency:  1x/wk for 4 wks for management and education for L thigh wound    Information was gathered and will be sent to provider for review.  RN will contact Home Care with information after provider review.  Confirmed ok to leave a detailed message with call back.  Contact information confirmed and updated as needed.    Angela Prince RN

## 2024-10-21 ENCOUNTER — MEDICAL CORRESPONDENCE (OUTPATIENT)
Dept: HEALTH INFORMATION MANAGEMENT | Facility: CLINIC | Age: 79
End: 2024-10-21
Payer: MEDICARE

## 2024-10-23 ENCOUNTER — MEDICAL CORRESPONDENCE (OUTPATIENT)
Dept: HEALTH INFORMATION MANAGEMENT | Facility: CLINIC | Age: 79
End: 2024-10-23
Payer: MEDICARE

## 2024-10-23 ENCOUNTER — ANTICOAGULATION THERAPY VISIT (OUTPATIENT)
Dept: ANTICOAGULATION | Facility: CLINIC | Age: 79
End: 2024-10-23
Payer: MEDICARE

## 2024-10-23 DIAGNOSIS — I48.19 PERSISTENT ATRIAL FIBRILLATION (H): ICD-10-CM

## 2024-10-23 DIAGNOSIS — I48.0 PAROXYSMAL ATRIAL FIBRILLATION (H): Primary | ICD-10-CM

## 2024-10-23 LAB — INR (EXTERNAL): 3.4 (ref 0.9–1.1)

## 2024-10-23 NOTE — PROGRESS NOTES
ANTICOAGULATION MANAGEMENT     Rian Ness 78 year old male is on warfarin with supratherapeutic INR result. (Goal INR 2.0-3.0)    Recent labs: (last 7 days)     10/23/24  1217   INR 3.4*       ASSESSMENT     Source(s): Chart Review, Patient/Caregiver Call, and Home Care/Facility Nurse     Warfarin doses taken: Warfarin taken as instructed  Diet: No new diet changes identified  Medication/supplement changes: None noted  New illness, injury, or hospitalization: No  Signs or symptoms of bleeding or clotting: Yes: bruise on leg is getting smaller per home care and patient   Previous result: Supratherapeutic  Additional findings: None       PLAN     Recommended plan for no diet, medication or health factor changes affecting INR     Dosing Instructions: partial hold then decrease your warfarin dose (6.7% change) with next INR in 1 week       Summary  As of 10/23/2024      Full warfarin instructions:  10/23: 2.5 mg; Otherwise 5 mg every day   Next INR check:  10/30/2024               Telephone call with [place] home care nurse who verbalizes understanding and agrees to plan and who agrees to plan and repeated back plan correctly    Orders given to  Homecare nurse/facility to recheck    Education provided: Symptom monitoring: monitoring for bleeding signs and symptoms and when to seek medical attention/emergency care  Contact [phone] with any changes, questions or concerns.     Plan made per [facility] anticoagulation protocol    Cindy Davis RN  10/23/2024  Anticoagulation Clinic  [employer] for routing messages: silvana ULLOA  [facility] patient phone line: [phone]        _______________________________________________________________________     Anticoagulation Episode Summary       Current INR goal:  2.0-3.0   TTR:  81.4% (1 y)   Target end date:  Indefinite   Send INR reminders to:  LATIA ULLOA    Indications    Paroxysmal atrial fibrillation (H) [I48.0]  Persistent atrial fibrillation (H) [I48.19]              Comments:  [facility]             Anticoagulation Care Providers       Provider Role Specialty Phone number    Junie Golden MD Referring Family Medicine [phone]

## 2024-10-30 ENCOUNTER — ANTICOAGULATION THERAPY VISIT (OUTPATIENT)
Dept: ANTICOAGULATION | Facility: CLINIC | Age: 79
End: 2024-10-30
Payer: MEDICARE

## 2024-10-30 DIAGNOSIS — I48.19 PERSISTENT ATRIAL FIBRILLATION (H): ICD-10-CM

## 2024-10-30 DIAGNOSIS — I48.0 PAROXYSMAL ATRIAL FIBRILLATION (H): Primary | ICD-10-CM

## 2024-10-30 LAB — INR (EXTERNAL): 2.8 (ref 0.9–1.1)

## 2024-10-30 NOTE — PROGRESS NOTES
ANTICOAGULATION MANAGEMENT     Rian Ness 79 year old male is on warfarin with therapeutic INR result. (Goal INR 2.0-3.0)    Recent labs: (last 7 days)     10/30/24  1555   INR 2.8*       ASSESSMENT     Source(s): Chart Review, Patient/Caregiver Call, and Home Care/Facility Nurse     Warfarin doses taken: Warfarin taken as instructed  Diet: No new diet changes identified  Medication/supplement changes: None noted  New illness, injury, or hospitalization: No  Signs or symptoms of bleeding or clotting: No  Previous result: Supratherapeutic  Additional findings: None       PLAN     Recommended plan for no diet, medication or health factor changes affecting INR     Dosing Instructions: Continue your current warfarin dose with next INR in 1 week       Summary  As of 10/30/2024      Full warfarin instructions:  5 mg every day   Next INR check:  11/6/2024               Telephone call with Aramis JIMENEZ, [facility] home care nurse who agrees to plan and repeated back plan correctly    Orders given to  Homecare nurse/facility to recheck    Education provided: None required    Plan made per [facility] anticoagulation protocol    Danay Monte RN  10/30/2024  Anticoagulation Clinic  [employer] for routing messages: silvana ULLOA  [facility] patient phone line: [phone]        _______________________________________________________________________     Anticoagulation Episode Summary       Current INR goal:  2.0-3.0   TTR:  80.4% (1 y)   Target end date:  Indefinite   Send INR reminders to:  LATIA ULLOA    Indications    Paroxysmal atrial fibrillation (H) [I48.0]  Persistent atrial fibrillation (H) [I48.19]             Comments:  [facility]             Anticoagulation Care Providers       Provider Role Specialty Phone number    Junie Golden MD Referring Family Medicine [phone]

## 2024-11-06 ENCOUNTER — ANTICOAGULATION THERAPY VISIT (OUTPATIENT)
Dept: ANTICOAGULATION | Facility: CLINIC | Age: 79
End: 2024-11-06
Payer: MEDICARE

## 2024-11-06 ENCOUNTER — DOCUMENTATION ONLY (OUTPATIENT)
Dept: ANTICOAGULATION | Facility: CLINIC | Age: 79
End: 2024-11-06
Payer: MEDICARE

## 2024-11-06 DIAGNOSIS — I48.19 PERSISTENT ATRIAL FIBRILLATION (H): ICD-10-CM

## 2024-11-06 DIAGNOSIS — I48.0 PAROXYSMAL ATRIAL FIBRILLATION (H): Primary | ICD-10-CM

## 2024-11-06 LAB — INR (EXTERNAL): 2.8

## 2024-11-06 NOTE — PROGRESS NOTES
ANTICOAGULATION CLINIC REFERRAL RENEWAL REQUEST       An annual renewal order is required for all patients referred to [facility].?  Please review and sign the pended referral order for Rian Ness.       ANTICOAGULATION SUMMARY      Warfarin indication(s)   Atrial Fibrillation    Mechanical heart valve present?  NO       Current goal range   INR: 2.0-3.0     Goal appropriate for indication? Goal INR 2-3, standard for indication(s) above     Time in Therapeutic Range (TTR)  (Goal > 60%) 82.3%       Office visit with referring provider's group within last year yes on 6/5/24       Marifer Naranjo RN  [facility]

## 2024-11-06 NOTE — PROGRESS NOTES
ANTICOAGULATION MANAGEMENT     Rian Ness 79 year old male is on warfarin with therapeutic INR result. (Goal INR 2.0-3.0)    Recent labs: (last 7 days)     11/06/24  0000   INR 2.8       ASSESSMENT     Source(s): Chart Review and Home Care/Facility Nurse - Car; from [facility] home care    Warfarin doses taken: Warfarin taken as instructed  Diet: No new diet changes identified  Medication/supplement changes: None noted  New illness, injury, or hospitalization: No  Signs or symptoms of bleeding or clotting: No  Previous result: Therapeutic last visit; previously outside of goal range  Additional findings: None       PLAN     Recommended plan for no diet, medication or health factor changes affecting INR     Dosing Instructions: Continue your current warfarin dose with next INR in 1 week       Summary  As of 11/6/2024      Full warfarin instructions:  5 mg every day   Next INR check:  11/13/2024               Telephone call with [place] home care nurse who verbalizes understanding and agrees to plan    Orders given to  Homecare nurse/facility to recheck    Education provided: Please call back if any changes to your diet, medications or how you've been taking warfarin    Plan made per [facility] anticoagulation protocol    Marifer Naranjo RN  11/6/2024  Anticoagulation Clinic  [employer] for routing messages: silvana ULLOA  [facility] patient phone line: [phone]        _______________________________________________________________________     Anticoagulation Episode Summary       Current INR goal:  2.0-3.0   TTR:  82.3% (1 y)   Target end date:  Indefinite   Send INR reminders to:  LATIA ULLOA    Indications    Paroxysmal atrial fibrillation (H) [I48.0]  Persistent atrial fibrillation (H) [I48.19]             Comments:  [facility]             Anticoagulation Care Providers       Provider Role Specialty Phone number    Junie Golden MD Referring Family Medicine [phone]

## 2024-11-07 ENCOUNTER — MEDICAL CORRESPONDENCE (OUTPATIENT)
Dept: HEALTH INFORMATION MANAGEMENT | Facility: CLINIC | Age: 79
End: 2024-11-07
Payer: MEDICARE

## 2024-11-13 ENCOUNTER — ANTICOAGULATION THERAPY VISIT (OUTPATIENT)
Dept: ANTICOAGULATION | Facility: CLINIC | Age: 79
End: 2024-11-13
Payer: MEDICARE

## 2024-11-13 ENCOUNTER — PATIENT OUTREACH (OUTPATIENT)
Dept: CARE COORDINATION | Facility: CLINIC | Age: 79
End: 2024-11-13
Payer: MEDICARE

## 2024-11-13 DIAGNOSIS — I48.0 PAROXYSMAL ATRIAL FIBRILLATION (H): Primary | ICD-10-CM

## 2024-11-13 DIAGNOSIS — I48.19 PERSISTENT ATRIAL FIBRILLATION (H): ICD-10-CM

## 2024-11-13 LAB — INR (EXTERNAL): 2.9 (ref 0.9–1.1)

## 2024-11-13 NOTE — PROGRESS NOTES
ANTICOAGULATION MANAGEMENT     Rian Ness 79 year old male is on warfarin with therapeutic INR result. (Goal INR 2.0-3.0)    Recent labs: (last 7 days)     11/13/24  1512   INR 2.9*       ASSESSMENT     Source(s): Chart Review, Patient/Caregiver Call, and Home Care/Facility Nurse     Warfarin doses taken: Warfarin taken as instructed  Diet: No new diet changes identified  Medication/supplement changes: None noted  New illness, injury, or hospitalization: No  Signs or symptoms of bleeding or clotting: No  Previous result: Therapeutic last 2(+) visits  Additional findings: None       PLAN     Recommended plan for no diet, medication or health factor changes affecting INR     Dosing Instructions: Continue your current warfarin dose with next INR in 1 week       Summary  As of 11/13/2024      Full warfarin instructions:  5 mg every day   Next INR check:  --               Telephone call with Aramis JIMENEZ, [facility] home care nurse who agrees to plan and repeated back plan correctly    Orders given to  Homecare nurse/facility to recheck    Education provided: None required    Plan made per [facility] anticoagulation protocol    Danay Monte RN  11/13/2024  Anticoagulation Clinic  [employer] for routing messages: silvana ULLOA  [facility] patient phone line: [phone]        _______________________________________________________________________     Anticoagulation Episode Summary       Current INR goal:  2.0-3.0   TTR:  84.2% (1 y)   Target end date:  Indefinite   Send INR reminders to:  LATIA ULLOA    Indications    Paroxysmal atrial fibrillation (H) [I48.0]  Persistent atrial fibrillation (H) [I48.19]             Comments:  [facility]             Anticoagulation Care Providers       Provider Role Specialty Phone number    Junie Golden MD Referring Family Medicine [phone]

## 2024-11-20 ENCOUNTER — ANTICOAGULATION THERAPY VISIT (OUTPATIENT)
Dept: ANTICOAGULATION | Facility: CLINIC | Age: 79
End: 2024-11-20
Payer: MEDICARE

## 2024-11-20 DIAGNOSIS — I48.19 PERSISTENT ATRIAL FIBRILLATION (H): ICD-10-CM

## 2024-11-20 DIAGNOSIS — I48.0 PAROXYSMAL ATRIAL FIBRILLATION (H): Primary | ICD-10-CM

## 2024-11-20 LAB — INR (EXTERNAL): 1.6 (ref 0.9–1.1)

## 2024-11-20 NOTE — PROGRESS NOTES
ANTICOAGULATION MANAGEMENT     Rian Ness 79 year old male is on warfarin with subtherapeutic INR result. (Goal INR 2.0-3.0)    Recent labs: (last 7 days)     11/20/24  1630   INR 1.6*       ASSESSMENT     Source(s): Chart Review and Home Care/Facility Nurse     Warfarin doses taken: Warfarin taken as instructed  Diet: Increased greens/vitamin K in diet; plans to resume previous intake  Medication/supplement changes:  levofloxacin 750 mg daily 7 day course (dates: 11/18-11/25) which may be increasing INR today. Closer INR monitoring recommended.  Augmentin 7 day course (dates: 11/18-11/25) subsequent INRs may be increased. Closer INR monitoring recommended.  Home care will not go out any sooner than 11/29/24 because the patient is due for re-certification  New illness, injury, or hospitalization: Wound  Signs or symptoms of bleeding or clotting: No  Previous result: Therapeutic last 2(+) visits  Additional findings: None       PLAN     Recommended plan for temporary change(s) affecting INR     Dosing Instructions: Continue your current warfarin dose with next INR in 10 days       Summary  As of 11/20/2024      Full warfarin instructions:  5 mg every day   Next INR check:  11/29/2024               Telephone call with [place] home care nurse who verbalizes understanding and agrees to plan    Orders given to  Homecare nurse/facility to recheck    Education provided: Please call back if any changes to your diet, medications or how you've been taking warfarin    Plan made per [facility] anticoagulation protocol    Josette Sheffield RN  11/20/2024  Anticoagulation Clinic  [employer] for routing messages: silvana ULLOA  [facility] patient phone line: [phone]        _______________________________________________________________________     Anticoagulation Episode Summary       Current INR goal:  2.0-3.0   TTR:  84.7% (1 y)   Target end date:  Indefinite   Send INR reminders to:  LATIA Soto    Paroxysmal  atrial fibrillation (H) [I48.0]  Persistent atrial fibrillation (H) [I48.19]             Comments:  [facility]             Anticoagulation Care Providers       Provider Role Specialty Phone number    Junie Golden MD Referring Family Medicine [phone]

## 2024-11-27 ENCOUNTER — PATIENT OUTREACH (OUTPATIENT)
Dept: CARE COORDINATION | Facility: CLINIC | Age: 79
End: 2024-11-27
Payer: MEDICARE

## 2024-11-29 ENCOUNTER — TELEPHONE (OUTPATIENT)
Dept: FAMILY MEDICINE | Facility: CLINIC | Age: 79
End: 2024-11-29
Payer: MEDICARE

## 2024-11-29 NOTE — TELEPHONE ENCOUNTER
Home Care is calling regarding an established patient with  [facility] [place].        11/29/2024     4:03 PM 10/2/2024     3:53 PM   Home Care Information   Date of Home Care episode start 8/12/2024 8/12/2024   Current following provider [address]   Date provider agreed to follow 8/12/2024 8/12/2024    Name/Phone Number Aramis JIMENEZ [phone]    [facility]     Requesting orders from: Junie Golden  Provider is following patient: Yes  Is this a 60-day recertification request?  Yes    Orders Requested    Skilled Nursing  Request for recertification   Frequency:  1x/wk for 9 wks      Information was gathered and will be sent to provider for review.  RN will contact Home Care with information after provider review.  Confirmed ok to leave a detailed message with call back.  Contact information confirmed and updated as needed.    Nadira Louis RN

## 2024-12-02 NOTE — TELEPHONE ENCOUNTER
Called Maribell to relay providers approval of orders. Maribell verbalized understanding. No additional questions at this time.    Thank you - Mandi Hendrickson, NESHAN, RN

## 2024-12-04 ENCOUNTER — MEDICAL CORRESPONDENCE (OUTPATIENT)
Dept: HEALTH INFORMATION MANAGEMENT | Facility: CLINIC | Age: 79
End: 2024-12-04
Payer: MEDICARE

## 2024-12-09 ENCOUNTER — MEDICAL CORRESPONDENCE (OUTPATIENT)
Dept: HEALTH INFORMATION MANAGEMENT | Facility: CLINIC | Age: 79
End: 2024-12-09
Payer: MEDICARE

## 2024-12-09 ENCOUNTER — TELEPHONE (OUTPATIENT)
Dept: FAMILY MEDICINE | Facility: CLINIC | Age: 79
End: 2024-12-09
Payer: MEDICARE

## 2024-12-09 ENCOUNTER — MYC REFILL (OUTPATIENT)
Dept: FAMILY MEDICINE | Facility: CLINIC | Age: 79
End: 2024-12-09
Payer: MEDICARE

## 2024-12-09 DIAGNOSIS — I48.0 PAF (PAROXYSMAL ATRIAL FIBRILLATION) (H): ICD-10-CM

## 2024-12-09 RX ORDER — WARFARIN SODIUM 5 MG/1
TABLET ORAL
Qty: 100 TABLET | Refills: 3 | OUTPATIENT
Start: 2024-12-09

## 2024-12-09 RX ORDER — WARFARIN SODIUM 5 MG/1
TABLET ORAL
Qty: 100 TABLET | Refills: 1 | Status: SHIPPED | OUTPATIENT
Start: 2024-12-09

## 2024-12-09 NOTE — TELEPHONE ENCOUNTER
ANTICOAGULATION MANAGEMENT:  Medication Refill    Anticoagulation Summary  As of 12/6/2024      Warfarin maintenance plan:  7.5 mg (5 mg x 1.5) every Fri; 5 mg (5 mg x 1) all other days   Next INR check:  12/16/2024   Target end date:  Indefinite    Indications    Paroxysmal atrial fibrillation (H) [I48.0]  Persistent atrial fibrillation (H) [I48.19]                 Anticoagulation Care Providers       Provider Role Specialty Phone number    Junie Golden MD Referring Family Medicine [phone]            Refill Criteria    Visit with referring provider/group: Meets criteria: visit within referring provider group in the last 15 months on 6/5/24    [facility] referral last signed: 11/07/2024; within last year:  Yes    Lab monitoring not exceeding 2 weeks overdue: Yes    Rian meets all criteria for refill. Rx instructions and quantity supplied updated to match patient's current dosing plan. Warfarin 90 day supply with 1 refill granted per [facility] protocol     Kat Vance RN  Anticoagulation Clinic

## 2024-12-09 NOTE — TELEPHONE ENCOUNTER
Reason for Call:  Form, our goal is to have forms completed with 72 hours, however, some forms may require a visit or additional information.    Type of letter, form or note:  Home Health Certification    Who is the form from?: Home care/[facility]     Where did the form come from: form was faxed in    What clinic location was the form placed at?: [place]    Where the form was placed:  Dr. Golden's Box/Folder    What number is listed as a contact on the form?: [phone]       Additional comments: Please route back to  when completed.  Thank you!    Call taken on 12/9/2024 at 10:51 AM by Letty Pozo MA

## 2024-12-12 DIAGNOSIS — Z53.9 DIAGNOSIS NOT YET DEFINED: Primary | ICD-10-CM

## 2024-12-12 PROCEDURE — G0179 MD RECERTIFICATION HHA PT: HCPCS | Performed by: FAMILY MEDICINE

## 2024-12-12 NOTE — TELEPHONE ENCOUNTER
Form completed by provider and sent to STAT scanning. Saved in TC folder.    Angeli DENT,    [facility]

## 2024-12-16 ENCOUNTER — ANTICOAGULATION THERAPY VISIT (OUTPATIENT)
Dept: ANTICOAGULATION | Facility: CLINIC | Age: 79
End: 2024-12-16
Payer: MEDICARE

## 2024-12-16 DIAGNOSIS — I48.0 PAROXYSMAL ATRIAL FIBRILLATION (H): Primary | ICD-10-CM

## 2024-12-16 DIAGNOSIS — I48.19 PERSISTENT ATRIAL FIBRILLATION (H): ICD-10-CM

## 2024-12-16 NOTE — PROGRESS NOTES
Patient refusing home care appt today, home care will see patient tomorrow. Advised to take 5 mg tonight. Patient INR 2.1 at last check, 2 weeks out is 12/20 so per protocol okay to recheck tomorrow.    Mariana Reese RN

## 2024-12-17 ENCOUNTER — MEDICAL CORRESPONDENCE (OUTPATIENT)
Dept: HEALTH INFORMATION MANAGEMENT | Facility: CLINIC | Age: 79
End: 2024-12-17

## 2024-12-17 ENCOUNTER — ANTICOAGULATION THERAPY VISIT (OUTPATIENT)
Dept: ANTICOAGULATION | Facility: CLINIC | Age: 79
End: 2024-12-17
Payer: MEDICARE

## 2024-12-17 DIAGNOSIS — I48.19 PERSISTENT ATRIAL FIBRILLATION (H): ICD-10-CM

## 2024-12-17 DIAGNOSIS — I48.0 PAROXYSMAL ATRIAL FIBRILLATION (H): Primary | ICD-10-CM

## 2024-12-17 LAB — INR (EXTERNAL): 2.4 (ref 0.9–1.1)

## 2024-12-17 NOTE — PROGRESS NOTES
ANTICOAGULATION MANAGEMENT     Rian Ness 79 year old male is on warfarin with therapeutic INR result. (Goal INR 2.0-3.0)    Recent labs: (last 7 days)     12/17/24  1420   INR 2.4*       ASSESSMENT     Source(s): Chart Review and Home Care/Facility Nurse     Warfarin doses taken: Warfarin taken as instructed  Diet: No new diet changes identified  Medication/supplement changes: None noted  New illness, injury, or hospitalization: No  Signs or symptoms of bleeding or clotting: No  Previous result: Therapeutic last visit; previously outside of goal range  Additional findings: patient is discharging from [facility] home [facility] today       PLAN     Recommended plan for no diet, medication or health factor changes affecting INR     Dosing Instructions: Continue your current warfarin dose with next INR in 2 weeks       Summary  As of 12/17/2024      Full warfarin instructions:  7.5 mg every Fri; 5 mg all other days   Next INR check:  12/31/2024               Telephone call with [facility] home care nurse who verbalizes understanding and agrees to plan    Orders given to  Homecare nurse/facility to recheck    Education provided: Please call back if any changes to your diet, medications or how you've been taking warfarin  Symptom monitoring: monitoring for bleeding signs and symptoms, monitoring for clotting signs and symptoms, and monitoring for stroke signs and symptoms    Plan made per [facility] anticoagulation protocol    Azul Macias RN  12/17/2024  Anticoagulation Clinic  [employer] for routing messages: silvana MEJIAS  [facility] patient phone line: [phone]        _______________________________________________________________________     Anticoagulation Episode Summary       Current INR goal:  2.0-3.0   TTR:  83.5% (1 y)   Target end date:  Indefinite   Send INR reminders to:  LATIA MEJIAS    Indications    Paroxysmal atrial fibrillation (H) [I48.0]  Persistent atrial fibrillation (H) [I48.19]             Comments:   --             Anticoagulation Care Providers       Provider Role Specialty Phone number    Junie Golden MD Referring Family Medicine [phone]

## 2024-12-18 ENCOUNTER — MEDICAL CORRESPONDENCE (OUTPATIENT)
Dept: HEALTH INFORMATION MANAGEMENT | Facility: CLINIC | Age: 79
End: 2024-12-18

## 2025-01-17 DIAGNOSIS — K21.9 GASTROESOPHAGEAL REFLUX DISEASE WITHOUT ESOPHAGITIS: ICD-10-CM

## 2025-01-23 ENCOUNTER — ANTICOAGULATION THERAPY VISIT (OUTPATIENT)
Dept: ANTICOAGULATION | Facility: CLINIC | Age: 80
End: 2025-01-23

## 2025-01-23 ENCOUNTER — LAB (OUTPATIENT)
Dept: LAB | Facility: CLINIC | Age: 80
End: 2025-01-23
Payer: MEDICARE

## 2025-01-23 DIAGNOSIS — R60.0 PERIPHERAL EDEMA: ICD-10-CM

## 2025-01-23 DIAGNOSIS — I48.0 PAROXYSMAL ATRIAL FIBRILLATION (H): Primary | ICD-10-CM

## 2025-01-23 DIAGNOSIS — C61 MALIGNANT NEOPLASM PROSTATE (H): ICD-10-CM

## 2025-01-23 DIAGNOSIS — I48.19 PERSISTENT ATRIAL FIBRILLATION (H): ICD-10-CM

## 2025-01-23 DIAGNOSIS — I48.0 PAROXYSMAL ATRIAL FIBRILLATION (H): ICD-10-CM

## 2025-01-23 DIAGNOSIS — I10 HYPERTENSION GOAL BP (BLOOD PRESSURE) < 140/90: ICD-10-CM

## 2025-01-23 DIAGNOSIS — Z12.5 SCREENING FOR PROSTATE CANCER: ICD-10-CM

## 2025-01-23 LAB — INR BLD: 2.8 (ref 0.9–1.1)

## 2025-01-23 NOTE — PROGRESS NOTES
ANTICOAGULATION MANAGEMENT     Rian Ness 79 year old male is on warfarin with therapeutic INR result. (Goal INR 2.0-3.0)    Recent labs: (last 7 days)     01/23/25  1107   INR 2.8*       ASSESSMENT     Source(s): Chart Review and Patient/Caregiver Call     Warfarin doses taken: Warfarin taken as instructed  Diet: No new diet changes identified  Medication/supplement changes: None noted  New illness, injury, or hospitalization: No  Signs or symptoms of bleeding or clotting: No  Previous result: Therapeutic last 2(+) visits  Additional findings: None       PLAN     Recommended plan for no diet, medication or health factor changes affecting INR     Dosing Instructions: Continue your current warfarin dose with next INR in 4 weeks       Summary  As of 1/23/2025      Full warfarin instructions:  7.5 mg every Fri; 5 mg all other days   Next INR check:  2/20/2025               Telephone call with Rian who verbalizes understanding and agrees to plan and who agrees to plan and repeated back plan correctly    Lab visit scheduled    Education provided: Contact [phone] with any changes, questions or concerns.     Plan made per [facility] anticoagulation protocol    Salome Castellon RN  1/23/2025  Anticoagulation Clinic  [employer] for routing messages: silvana MEJIAS  [facility] patient phone line: [phone]        _______________________________________________________________________     Anticoagulation Episode Summary       Current INR goal:  2.0-3.0   TTR:  91.0% (1 y)   Target end date:  Indefinite   Send INR reminders to:  LATIA MEJIAS    Indications    Paroxysmal atrial fibrillation (H) [I48.0]  Persistent atrial fibrillation (H) [I48.19]             Comments:  --             Anticoagulation Care Providers       Provider Role Specialty Phone number    Junie Golden MD Referring Family Medicine [phone]

## 2025-02-06 ENCOUNTER — LAB REQUISITION (OUTPATIENT)
Dept: LAB | Facility: CLINIC | Age: 80
End: 2025-02-06

## 2025-02-06 DIAGNOSIS — A15.0 TUBERCULOSIS OF LUNG: ICD-10-CM

## 2025-02-09 LAB
GAMMA INTERFERON BACKGROUND BLD IA-ACNC: 0.01 IU/ML
M TB IFN-G BLD-IMP: ABNORMAL
M TB IFN-G CD4+ BCKGRND COR BLD-ACNC: 0.06 IU/ML
MITOGEN IGNF BCKGRD COR BLD-ACNC: 0 IU/ML
MITOGEN IGNF BCKGRD COR BLD-ACNC: 0 IU/ML
QUANTIFERON MITOGEN: 0.07 IU/ML
QUANTIFERON NIL TUBE: 0.01 IU/ML
QUANTIFERON TB1 TUBE: 0.01 IU/ML
QUANTIFERON TB2 TUBE: 0.01

## 2025-02-11 ENCOUNTER — TELEPHONE (OUTPATIENT)
Dept: FAMILY MEDICINE | Facility: CLINIC | Age: 80
End: 2025-02-11

## 2025-02-11 NOTE — TELEPHONE ENCOUNTER
General Call      Reason for Call:  INR    What are your questions or concerns:  Kavitha from [facility] called with pts INR for today: 2.4.     Date of last appointment with provider:     Could we send this information to you in [employer] or would you prefer to receive a phone call?:    [phone]

## 2025-02-11 NOTE — TELEPHONE ENCOUNTER
Noted. [facility] TCU is not one of the TCU that [facility] manage. With patient discharged from [facility] - reached out to Rica at [facility] to determine if [facility] is managing patient at [facility] - per Marah, [facility] does not manage this patient.     [facility] have provider to manage but there was a progress note in his chart to call [facility].  Advised Kavitha to have in house provider at TCU manage INR while in TCU. [facility] will resume management after discharge TCU.  Kavitha will report INR result to in house provider.     Marifer Naranjo RN  [facility] Anticoagulation Clinic.

## 2025-02-20 ENCOUNTER — LAB REQUISITION (OUTPATIENT)
Dept: LAB | Facility: CLINIC | Age: 80
End: 2025-02-20
Payer: MEDICARE

## 2025-02-20 DIAGNOSIS — I48.20 CHRONIC ATRIAL FIBRILLATION, UNSPECIFIED (H): ICD-10-CM

## 2025-02-24 ENCOUNTER — TELEPHONE (OUTPATIENT)
Dept: FAMILY MEDICINE | Facility: CLINIC | Age: 80
End: 2025-02-24
Payer: MEDICARE

## 2025-02-24 NOTE — TELEPHONE ENCOUNTER
Ok to leave a message with the provider's response.      Home Care is calling regarding an established patient with [facility].  Requesting orders from: Junie Golden RN APPROVED: RN able to provide verbal orders.  Home Care will send orders for signature.  RN will close encounter.    Is this a request for a temporary pause in the home care episode?  No    Orders Requested    Skilled Nursing  Request for initial certification (first set of orders)   Frequency:   1 x a week for 4 weeks   1 x every other week for 4 weeks: Post TCU - fall and metabolic encephalopathy.   RN gave verbal order: Yes      Physical Therapy  Request for initial evaluation and treatment (one time)   RN gave verbal order: Yes      Occupational Therapy  Request for initial evaluation and treatment (one time)   RN gave verbal order: Yes    Caller: See contact section  Home Care Agency: See contact sectionSee contact section  Phone number Home Care can be reached at:   Okay to leave a detailed message?: Yes    Mary was asked to inform Rian he will need a hospital follow up appointment.     Laura Benoit RN     Verified Results  Hemoglobin A1c- POC [id number] 10:42AM Ace Segundo     Test Name Result Flag Reference   HEMOGLOBIN A1C 6 6 A    EST  AVG  GLUCOSE 143 A    HEMOGLOBIN A1C 6 6 A    EST  AVG   GLUCOSE 143 A

## 2025-02-25 ENCOUNTER — TELEPHONE (OUTPATIENT)
Dept: ANTICOAGULATION | Facility: CLINIC | Age: 80
End: 2025-02-25
Payer: MEDICARE

## 2025-02-25 NOTE — TELEPHONE ENCOUNTER
Left a message at [facility] for them to call [facility]. Per note from 2/24/25 the patient discharged TCU with home care.    Need to know when they will see the patient and if they will be doing INR.    Danay Monte RN    [facility] Anticoagulation Clinic

## 2025-02-26 ENCOUNTER — TELEPHONE (OUTPATIENT)
Dept: ANTICOAGULATION | Facility: CLINIC | Age: 80
End: 2025-02-26
Payer: MEDICARE

## 2025-02-26 NOTE — TELEPHONE ENCOUNTER
ANTICOAGULATION     Rian Ness is overdue for an INR check.     Left a generic message for home care nurse to call back to confirm patient will be seen this week for INR. Due 2/28/25 per discharge notes from TCU. Will postpone reminder to Friday.    Mariana Reese RN  2/26/2025  Anticoagulation Clinic  [facility] for routing messages: silvana ULLOA  [facility] patient phone line: [phone]

## 2025-02-26 NOTE — TELEPHONE ENCOUNTER
Morelia returned call to Anticoagulation clinic and confirmed he's being seen Friday.    Mariana Reese RN

## 2025-02-26 NOTE — TELEPHONE ENCOUNTER
Left detailed VM for eriberto [facility], to recheck INR on 2/28 and call Anticoagulation clinic back to confirm this can be completed.  Mariana Reese RN

## 2025-03-04 ENCOUNTER — ANTICOAGULATION THERAPY VISIT (OUTPATIENT)
Dept: ANTICOAGULATION | Facility: CLINIC | Age: 80
End: 2025-03-04

## 2025-03-04 ENCOUNTER — OFFICE VISIT (OUTPATIENT)
Dept: FAMILY MEDICINE | Facility: CLINIC | Age: 80
End: 2025-03-04
Payer: MEDICARE

## 2025-03-04 VITALS
SYSTOLIC BLOOD PRESSURE: 136 MMHG | TEMPERATURE: 98 F | RESPIRATION RATE: 24 BRPM | DIASTOLIC BLOOD PRESSURE: 85 MMHG | HEIGHT: 67 IN | HEART RATE: 90 BPM | WEIGHT: 203 LBS | OXYGEN SATURATION: 96 % | BODY MASS INDEX: 31.86 KG/M2

## 2025-03-04 DIAGNOSIS — I48.0 PAROXYSMAL ATRIAL FIBRILLATION (H): Primary | ICD-10-CM

## 2025-03-04 DIAGNOSIS — Z09 HOSPITAL DISCHARGE FOLLOW-UP: Primary | ICD-10-CM

## 2025-03-04 DIAGNOSIS — R73.09 ELEVATED GLUCOSE: ICD-10-CM

## 2025-03-04 DIAGNOSIS — I10 HYPERTENSION GOAL BP (BLOOD PRESSURE) < 140/90: ICD-10-CM

## 2025-03-04 DIAGNOSIS — M62.82 NON-TRAUMATIC RHABDOMYOLYSIS: ICD-10-CM

## 2025-03-04 DIAGNOSIS — I48.19 PERSISTENT ATRIAL FIBRILLATION (H): ICD-10-CM

## 2025-03-04 DIAGNOSIS — K21.9 GASTROESOPHAGEAL REFLUX DISEASE WITHOUT ESOPHAGITIS: ICD-10-CM

## 2025-03-04 DIAGNOSIS — R60.0 PERIPHERAL EDEMA: ICD-10-CM

## 2025-03-04 DIAGNOSIS — J10.1 INFLUENZA A: ICD-10-CM

## 2025-03-04 LAB
ANION GAP SERPL CALCULATED.3IONS-SCNC: 10 MMOL/L (ref 7–15)
BUN SERPL-MCNC: 16.2 MG/DL (ref 8–23)
CALCIUM SERPL-MCNC: 9.3 MG/DL (ref 8.8–10.4)
CHLORIDE SERPL-SCNC: 105 MMOL/L (ref 98–107)
CREAT SERPL-MCNC: 0.73 MG/DL (ref 0.67–1.17)
EGFRCR SERPLBLD CKD-EPI 2021: >90 ML/MIN/1.73M2
EST. AVERAGE GLUCOSE BLD GHB EST-MCNC: 120 MG/DL
GLUCOSE SERPL-MCNC: 187 MG/DL (ref 70–99)
HBA1C MFR BLD: 5.8 % (ref 0–5.6)
HCO3 SERPL-SCNC: 27 MMOL/L (ref 22–29)
INR BLD: 1.4 (ref 0.9–1.1)
POTASSIUM SERPL-SCNC: 4 MMOL/L (ref 3.4–5.3)
SODIUM SERPL-SCNC: 142 MMOL/L (ref 135–145)

## 2025-03-04 PROCEDURE — 85610 PROTHROMBIN TIME: CPT | Performed by: FAMILY MEDICINE

## 2025-03-04 PROCEDURE — 36415 COLL VENOUS BLD VENIPUNCTURE: CPT | Performed by: FAMILY MEDICINE

## 2025-03-04 PROCEDURE — 83036 HEMOGLOBIN GLYCOSYLATED A1C: CPT | Performed by: FAMILY MEDICINE

## 2025-03-04 PROCEDURE — 80048 BASIC METABOLIC PNL TOTAL CA: CPT | Performed by: FAMILY MEDICINE

## 2025-03-04 RX ORDER — OMEPRAZOLE 20 MG/1
CAPSULE, DELAYED RELEASE ORAL
Qty: 180 CAPSULE | Refills: 2 | Status: SHIPPED | OUTPATIENT
Start: 2025-03-04

## 2025-03-04 ASSESSMENT — PAIN SCALES - GENERAL: PAINLEVEL_OUTOF10: NO PAIN (0)

## 2025-03-04 NOTE — PROGRESS NOTES
"  Assessment & Plan     (Z09) Hospital discharge follow-up  (primary encounter diagnosis)  (J10.1) Influenza A  (M62.82) Non-traumatic rhabdomyolysis  (I48.19) Persistent atrial fibrillation (H)  Comment: home x 10 days after TCU stay doing well  Plan: home care helping with PT/OT/RN  Feels back to baseline    (I10) Hypertension goal BP (blood pressure) < 140/90  Comment: to goal  Plan: Basic metabolic panel  (Ca, Cl, CO2, Creat,         Gluc, K, Na, BUN)       Continue current meds    (R60.0) Peripheral edema  Comment: flares at times  Plan: as needed furosemide    (K21.9) Gastroesophageal reflux disease without esophagitis  Comment: controlled  Plan: omeprazole (PRILOSEC) 20 MG DR capsule        Refill x 1 yr     (R73.09) Elevated glucose  Comment: due for lab  Plan: A1c today        MED REC REQUIRED  Post Medication Reconciliation Status: discharge medications reconciled, continue medications without change  BMI  Estimated body mass index is 32.27 kg/m  as calculated from the following:    Height as of this encounter: 1.689 m (5' 6.5\").    Weight as of this encounter: 92.1 kg (203 lb).   Weight management plan: Discussed healthy diet and exercise guidelines      See Patient Instructions    Rose Modi is a 79 year old, presenting for the following health issues:  Hospital F/U        3/4/2025     8:48 AM   Additional Questions   Roomed by Mat SALAS          Hospital Follow-up Visit:    Hospital/Nursing Home/IP Rehab Facility:  [facility]   Date of Admission: 1/27/25 -[facility], 2/6/2025 - TCU  Date of Discharge: 2/6/25- [facility],  2/21/2025 - TCU  Reason(s) for Admission: Non-traumatic rhabdomylosis, chronic atrial fibrillation with RVR, Encephalopathy, Influenza A  Was the patient in the ICU or did the patient experience delirium during hospitalization?  No  Do you have any other stressors you would like to discuss with your provider? No    Problems taking medications regularly:  None  Medication changes since " "discharge: Needing clarification on furosemide using as needed for leg swelling   Problems adhering to non-medication therapy:  Getting home care services to include PT/OT/RN       Summary of hospitalization:  CareEverywhere information obtained and reviewed  Diagnostic Tests/Treatments reviewed.  Follow up needed: needs INR  Other Healthcare Providers Involved in Patient s Care:         Homecare and Surgical follow-up appointment - ortho  Update since discharge: improved.     Rian is here today for follow-up after hospitalization and prolonged TCU stay.  He is back home now for about 10 days and is feeling well.  He feels he is close to baseline.  He is getting home care and that is going well.  His injured right shoulder is functional he is able to complete most of what he wants to do and he is continue to get PT through home care.  Ortho stated they did not want to do surgical repair of his rotator cuff due to his age and the recovery.    Plan of care communicated with patient                   Review of Systems  Constitutional, HEENT, cardiovascular, pulmonary, gi and gu systems are negative, except as otherwise noted.      Objective    /85   Pulse 90   Temp 98  F (36.7  C) (Oral)   Resp 24   Ht 1.689 m (5' 6.5\")   Wt 92.1 kg (203 lb)   SpO2 96%   BMI 32.27 kg/m    Body mass index is 32.27 kg/m .  Physical Exam   GENERAL: alert and no distress  EYES: Eyes grossly normal to inspection, PERRL and conjunctivae and sclerae normal  RESP: lungs clear to auscultation - no rales, rhonchi or wheezes  CV: regular rate and rhythm, normal S1 S2, no S3 or S4, no murmur, click or rub, no peripheral edema   MS: no gross musculoskeletal defects noted, no edema  SKIN: no suspicious lesions or rashes  PSYCH: mentation appears normal, affect normal/bright            Signed Electronically by: Junie Golden MD    "

## 2025-03-04 NOTE — PROGRESS NOTES
A transvaginal ultrasound was performed  Sonographer note on use of High Level Disinfection Process (Trophon) for transvaginal probe#  1  used, serial #  [id number]    Ana Garcia RDMS ANTICOAGULATION MANAGEMENT     Rian Ness 79 year old male is on warfarin with subtherapeutic INR result. (Goal INR 2.0-3.0)    Recent labs: (last 7 days)     03/04/25  0930   INR 1.4*       ASSESSMENT     Source(s): Chart Review and Patient/Caregiver Call     Warfarin doses taken: Missed dose(s) may be affecting INR  Diet: No new diet changes identified  Medication/supplement changes: None noted  New illness, injury, or hospitalization: No  Signs or symptoms of bleeding or clotting: No  Previous result: Therapeutic last visit; previously outside of goal range  Additional findings: None       PLAN     Recommended plan for temporary change(s) affecting INR     Dosing Instructions: booster dose then continue your current warfarin dose with next INR in 3 days       Summary  As of 3/4/2025      Full warfarin instructions:  3/4: 10 mg; Otherwise 7.5 mg every Mon, Wed, Fri; 5 mg all other days   Next INR check:  3/7/2025               Telephone call with Rian who agrees to plan and repeated back plan correctly  Detailed voice message left for [facility] home care nurse with dosing instructions and follow up date.     Orders given to  Homecare nurse/facility to recheck    Education provided: Please call back if any changes to your diet, medications or how you've been taking warfarin  Goal range and lab monitoring: goal range and significance of current result, Importance of therapeutic range, and Importance of following up at instructed interval    Plan made per [facility] anticoagulation protocol    Ana Laura Orozco, RN  3/4/2025  Anticoagulation Clinic  [employer] for routing messages: silvana ULLOA  [facility] patient phone line: [phone]        _______________________________________________________________________     Anticoagulation Episode Summary       Current INR goal:  2.0-3.0   TTR:  85.9% (1 y)   Target end date:  Indefinite   Send INR reminders to:  LATIA ULLOA    Indications    Paroxysmal atrial fibrillation (H)  [I48.0]  Persistent atrial fibrillation (H) [I48.19]             Comments:  Back to [place] when dc'd from TCU             Anticoagulation Care Providers       Provider Role Specialty Phone number    Junie Golden MD Referring Family Medicine [phone]

## 2025-03-08 ENCOUNTER — HEALTH MAINTENANCE LETTER (OUTPATIENT)
Age: 80
End: 2025-03-08

## 2025-03-12 ENCOUNTER — TELEPHONE (OUTPATIENT)
Dept: FAMILY MEDICINE | Facility: CLINIC | Age: 80
End: 2025-03-12
Payer: MEDICARE

## 2025-03-12 ENCOUNTER — MEDICAL CORRESPONDENCE (OUTPATIENT)
Dept: HEALTH INFORMATION MANAGEMENT | Facility: CLINIC | Age: 80
End: 2025-03-12
Payer: MEDICARE

## 2025-03-12 NOTE — TELEPHONE ENCOUNTER
Reason for Call:  Form, our goal is to have forms completed with 72 hours, however, some forms may require a visit or additional information.    Type of letter, form or note:  Home Health Certification    Who is the form from?: Home care/[facility]    Where did the form come from: form was faxed in    What clinic location was the form placed at?: [place]    Where the form was placed:  Dr. Golden's Box/Folder    What number is listed as a contact on the form?: [phone]       Additional comments: Please route back to TC's when completed.  Thank you!    Call taken on 3/12/2025 at 11:22 AM by Letty Pozo MA

## 2025-03-12 NOTE — TELEPHONE ENCOUNTER
Signed [facility] forms faxed to [employer], [facility], [employer] confirmed.  Originals placed in brown folder.  Letty SHAH    [facility]

## 2025-03-13 ENCOUNTER — ANTICOAGULATION THERAPY VISIT (OUTPATIENT)
Dept: ANTICOAGULATION | Facility: CLINIC | Age: 80
End: 2025-03-13
Payer: MEDICARE

## 2025-03-13 DIAGNOSIS — I48.0 PAROXYSMAL ATRIAL FIBRILLATION (H): Primary | ICD-10-CM

## 2025-03-13 DIAGNOSIS — I48.19 PERSISTENT ATRIAL FIBRILLATION (H): ICD-10-CM

## 2025-03-13 LAB — INR (EXTERNAL): 2.3 (ref 0.9–1.1)

## 2025-03-13 NOTE — PROGRESS NOTES
ANTICOAGULATION MANAGEMENT     Rian Ness 79 year old male is on warfarin with therapeutic INR result. (Goal INR 2.0-3.0)    Recent labs: (last 7 days)     03/13/25  1309   INR 2.3*       ASSESSMENT     Source(s): Chart Review and Home Care/Facility Nurse     Warfarin doses taken: Warfarin taken as instructed  Diet: No new diet changes identified  Medication/supplement changes: None noted  New illness, injury, or hospitalization: No  Signs or symptoms of bleeding or clotting: No  Previous result: Subtherapeutic  Additional findings: None       PLAN     Recommended plan for no diet, medication or health factor changes affecting INR     Dosing Instructions: Continue your current warfarin dose with next INR in 1 week       Summary  As of 3/13/2025      Full warfarin instructions:  7.5 mg every Mon, Wed, Fri; 5 mg all other days   Next INR check:  3/20/2025               Telephone call with [facility] home care nurse who verbalizes understanding and agrees to plan    Orders given to  Homecare nurse/facility to recheck    Education provided: Please call back if any changes to your diet, medications or how you've been taking warfarin  Symptom monitoring: monitoring for bleeding signs and symptoms and monitoring for clotting signs and symptoms    Plan made per [facility] anticoagulation protocol    Azul Macias RN  3/13/2025  Anticoagulation Clinic  [place] for routing messages: silvana ULLOA  [facility] patient phone line: [phone]        _______________________________________________________________________     Anticoagulation Episode Summary       Current INR goal:  2.0-3.0   TTR:  84.1% (1 y)   Target end date:  Indefinite   Send INR reminders to:  LATIA ULLOA    Indications    Paroxysmal atrial fibrillation (H) [I48.0]  Persistent atrial fibrillation (H) [I48.19]             Comments:  Back to [place] when dc'd from TCU             Anticoagulation Care Providers       Provider Role Specialty Phone number     Junie Golden MD [facility] [phone]

## 2025-03-20 ENCOUNTER — TELEPHONE (OUTPATIENT)
Dept: FAMILY MEDICINE | Facility: CLINIC | Age: 80
End: 2025-03-20
Payer: MEDICARE

## 2025-03-20 ENCOUNTER — ANTICOAGULATION THERAPY VISIT (OUTPATIENT)
Dept: ANTICOAGULATION | Facility: CLINIC | Age: 80
End: 2025-03-20
Payer: MEDICARE

## 2025-03-20 DIAGNOSIS — I48.19 PERSISTENT ATRIAL FIBRILLATION (H): ICD-10-CM

## 2025-03-20 DIAGNOSIS — I48.0 PAROXYSMAL ATRIAL FIBRILLATION (H): Primary | ICD-10-CM

## 2025-03-20 LAB — INR (EXTERNAL): 2 (ref 0.9–1.1)

## 2025-03-20 NOTE — TELEPHONE ENCOUNTER
Home Care is calling regarding an established patient with [facility].  Requesting orders from: Junie Golden RN APPROVED: RN able to provide verbal orders.  Home Care will send orders for signature.  RN will close encounter.  Is this a request for a temporary pause in the home care episode?  No    Orders Requested    Skilled Nursing  Request for continuation of care with no increase or decrease in frequency  Frequency: 1 time a week for 5 weeks for INR assessment and continued medication education.   RN gave verbal order: Yes           Contacts       Contact Date/Time Type Contact Phone/Fax    03/20/2025 03:09 PM CDT Phone (Incoming) TONY Thibodeaux [phone]     confidential line, [facility] and hospice.            Melida Banuelos RN   
General Sunscreen Counseling: I recommended a broad spectrum sunscreen with a SPF of 30 or higher.  I explained that SPF 30 sunscreens block approximately 97 percent of the sun's harmful rays.  Sunscreens should be applied at least 15 minutes prior to expected sun exposure and then every 2 hours after that as long as sun exposure continues. If swimming or exercising sunscreen should be reapplied every 45 minutes to an hour after getting wet or sweating.  One ounce, or the equivalent of a shot glass full of sunscreen, is adequate to protect the skin not covered by a bathing suit. I also recommended a lip balm with a sunscreen as well. Sun protective clothing can be used in lieu of sunscreen but must be worn the entire time you are exposed to the sun's rays.
Detail Level: Detailed

## 2025-03-20 NOTE — PROGRESS NOTES
ANTICOAGULATION MANAGEMENT     Rian Ness 79 year old male is on warfarin with therapeutic INR result. (Goal INR 2.0-3.0)    Recent labs: (last 7 days)     03/20/25  1422   INR 2.0*       ASSESSMENT     Source(s): Chart Review and Home Care/Facility Nurse     Warfarin doses taken: Missed dose(s) may be affecting INR  Diet: No new diet changes identified  Medication/supplement changes: None noted  New illness, injury, or hospitalization: No  Signs or symptoms of bleeding or clotting: No  Previous result: Therapeutic last visit; previously outside of goal range  Additional findings: None       PLAN     Recommended plan for temporary change(s) affecting INR     Dosing Instructions: Continue your current warfarin dose with next INR in 2 weeks when home care returns        Summary  As of 3/20/2025      Full warfarin instructions:  7.5 mg every Mon, Wed, Fri; 5 mg all other days   Next INR check:  4/3/2025               Telephone call with [facility] home care nurse who verbalizes understanding and agrees to plan    Orders given to  Homecare nurse/facility to recheck    Education provided: Please call back if any changes to your diet, medications or how you've been taking warfarin  Symptom monitoring: monitoring for bleeding signs and symptoms and monitoring for clotting signs and symptoms    Plan made per [facility] anticoagulation protocol    Azul Macias RN  3/20/2025  Anticoagulation Clinic  [employer] [place] for routing messages: silvana ULLOA  [facility] patient phone line: [phone]        _______________________________________________________________________     Anticoagulation Episode Summary       Current INR goal:  2.0-3.0   TTR:  84.1% (1 y)   Target end date:  Indefinite   Send INR reminders to:  LATIA ULLOA    Indications    Paroxysmal atrial fibrillation (H) [I48.0]  Persistent atrial fibrillation (H) [I48.19]             Comments:  Back to [place] when dc'd from TCU             Anticoagulation Care Providers        Provider Role Specialty Phone number    Junie Golden MD Referring Family Medicine [phone]

## 2025-03-24 ENCOUNTER — MEDICAL CORRESPONDENCE (OUTPATIENT)
Dept: HEALTH INFORMATION MANAGEMENT | Facility: CLINIC | Age: 80
End: 2025-03-24
Payer: MEDICARE

## 2025-03-25 ENCOUNTER — MEDICAL CORRESPONDENCE (OUTPATIENT)
Dept: HEALTH INFORMATION MANAGEMENT | Facility: CLINIC | Age: 80
End: 2025-03-25
Payer: MEDICARE

## 2025-03-26 ENCOUNTER — MEDICAL CORRESPONDENCE (OUTPATIENT)
Dept: HEALTH INFORMATION MANAGEMENT | Facility: CLINIC | Age: 80
End: 2025-03-26

## 2025-03-26 NOTE — PATIENT INSTRUCTIONS
Glasses Rx given - optional  Use artificial tears up to 4 times daily both eyes.  (Refresh Tears, Systane Ultra/Balance, or Theratears)  Possible clouding of posterior capsule both eyes discussed.  Call in August 2020 for an appointment in December 2020 for Complete Exam    Dr. Abebe [phone]     Pt c/o dizziness w/ walking. Pt sat down, /70 and stated dizziness resolved w/ rest. PA w/ CV surgery notified and stated it was fine for pt to go to TCU as long as dizziness resolved w/ rest.

## 2025-03-31 ENCOUNTER — MEDICAL CORRESPONDENCE (OUTPATIENT)
Dept: HEALTH INFORMATION MANAGEMENT | Facility: CLINIC | Age: 80
End: 2025-03-31
Payer: MEDICARE

## 2025-04-01 ENCOUNTER — MEDICAL CORRESPONDENCE (OUTPATIENT)
Dept: HEALTH INFORMATION MANAGEMENT | Facility: CLINIC | Age: 80
End: 2025-04-01
Payer: MEDICARE

## 2025-04-03 ENCOUNTER — ANTICOAGULATION THERAPY VISIT (OUTPATIENT)
Dept: ANTICOAGULATION | Facility: CLINIC | Age: 80
End: 2025-04-03
Payer: MEDICARE

## 2025-04-03 LAB — INR (EXTERNAL): 2.4 (ref 0.9–1.1)

## 2025-04-03 NOTE — PROGRESS NOTES
ANTICOAGULATION MANAGEMENT     Rian Ness 79 year old male is on warfarin with therapeutic INR result. (Goal INR 2.0-3.0)    Recent labs: (last 7 days)     04/03/25  1123   INR 2.4*       ASSESSMENT     Source(s): Chart Review, Patient/Caregiver Call, and Home Care/Facility Nurse     Warfarin doses taken: Warfarin taken as instructed  Diet: No new diet changes identified  Medication/supplement changes: None noted  New illness, injury, or hospitalization: No  Signs or symptoms of bleeding or clotting: No  Previous result: Therapeutic last 2(+) visits  Additional findings: None       PLAN     Recommended plan for no diet, medication or health factor changes affecting INR     Dosing Instructions: Continue your current warfarin dose with next INR in 2 weeks       Summary  As of 4/3/2025      Full warfarin instructions:  7.5 mg every Mon, Wed, Fri; 5 mg all other days   Next INR check:  4/17/2025               Telephone call with [facility] home care nurse who verbalizes understanding and agrees to plan    Orders given to  Homecare nurse/facility to recheck    Education provided: Please call back if any changes to your diet, medications or how you've been taking warfarin    Plan made per [facility] anticoagulation protocol    Josette Sheffield, RN  4/3/2025  Anticoagulation Clinic  [employer] for routing messages: silvana ULLOA  [facility] patient phone line: [phone]        _______________________________________________________________________     Anticoagulation Episode Summary       Current INR goal:  2.0-3.0   TTR:  84.1% (1 y)   Target end date:  Indefinite   Send INR reminders to:  LATIA ULLOA    Indications    Paroxysmal atrial fibrillation (H) [I48.0]  Persistent atrial fibrillation (H) [I48.19]             Comments:  Back to [place] when dc'd from TCU             Anticoagulation Care Providers       Provider Role Specialty Phone number    Junie Golden MD Referring Family Medicine [phone]

## 2025-04-07 ENCOUNTER — MEDICAL CORRESPONDENCE (OUTPATIENT)
Dept: HEALTH INFORMATION MANAGEMENT | Facility: CLINIC | Age: 80
End: 2025-04-07

## 2025-04-14 ENCOUNTER — MEDICAL CORRESPONDENCE (OUTPATIENT)
Dept: HEALTH INFORMATION MANAGEMENT | Facility: CLINIC | Age: 80
End: 2025-04-14

## 2025-04-17 ENCOUNTER — ANTICOAGULATION THERAPY VISIT (OUTPATIENT)
Dept: ANTICOAGULATION | Facility: CLINIC | Age: 80
End: 2025-04-17
Payer: MEDICARE

## 2025-04-17 DIAGNOSIS — I48.19 PERSISTENT ATRIAL FIBRILLATION (H): ICD-10-CM

## 2025-04-17 DIAGNOSIS — I48.0 PAROXYSMAL ATRIAL FIBRILLATION (H): Primary | ICD-10-CM

## 2025-04-17 LAB — INR (EXTERNAL): 2.2

## 2025-04-17 NOTE — PROGRESS NOTES
ANTICOAGULATION MANAGEMENT     Rian Ness 79 year old male is on warfarin with therapeutic INR result. (Goal INR 2.0-3.0)    Recent labs: (last 7 days)     04/17/25  0000   INR 2.2       ASSESSMENT     Source(s): Chart Review, Patient/Caregiver Call, and Home Care/Facility Nurse- [facility]    Warfarin doses taken: Warfarin taken as instructed  Diet: No new diet changes identified  Medication/supplement changes: None noted  New illness, injury, or hospitalization: No  Signs or symptoms of bleeding or clotting: No  Previous result: Therapeutic last 2(+) visits  Additional findings:  Discharge from home care today - next INR check at [place] [facility]       PLAN     Recommended plan for no diet, medication or health factor changes affecting INR     Dosing Instructions: Continue your current warfarin dose with next INR in 3 weeks       Summary  As of 4/17/2025      Full warfarin instructions:  7.5 mg every Mon, Wed, Fri; 5 mg all other days   Next INR check:  5/8/2025               Telephone call with [place], home care nurse who agrees to plan and repeated back plan correctly    Lab visit scheduled    Education provided: Please call back if any changes to your diet, medications or how you've been taking warfarin    Plan made per [facility] anticoagulation protocol    Marifer Naranjo RN  4/17/2025  Anticoagulation Clinic  [employer] for routing messages: silvana MEJIAS  [facility] patient phone line: [phone]        _______________________________________________________________________     Anticoagulation Episode Summary       Current INR goal:  2.0-3.0   TTR:  84.0% (1 y)   Target end date:  Indefinite   Send INR reminders to:  LATIA MEJIAS    Indications    Paroxysmal atrial fibrillation (H) [I48.0]  Persistent atrial fibrillation (H) [I48.19]             Comments:  --             Anticoagulation Care Providers       Provider Role Specialty Phone number    Junie Golden MD Referring  Family Medicine [phone]

## 2025-04-21 ENCOUNTER — OFFICE VISIT (OUTPATIENT)
Dept: FAMILY MEDICINE | Facility: CLINIC | Age: 80
End: 2025-04-21
Payer: MEDICARE

## 2025-04-21 ENCOUNTER — ANCILLARY PROCEDURE (OUTPATIENT)
Dept: GENERAL RADIOLOGY | Facility: CLINIC | Age: 80
End: 2025-04-21
Attending: FAMILY MEDICINE
Payer: MEDICARE

## 2025-04-21 VITALS
TEMPERATURE: 97.4 F | DIASTOLIC BLOOD PRESSURE: 78 MMHG | OXYGEN SATURATION: 96 % | RESPIRATION RATE: 20 BRPM | SYSTOLIC BLOOD PRESSURE: 119 MMHG | BODY MASS INDEX: 31.17 KG/M2 | WEIGHT: 198.6 LBS | HEART RATE: 75 BPM | HEIGHT: 67 IN

## 2025-04-21 DIAGNOSIS — M54.50 CHRONIC MIDLINE LOW BACK PAIN WITHOUT SCIATICA: ICD-10-CM

## 2025-04-21 DIAGNOSIS — Z00.00 ENCOUNTER FOR MEDICARE ANNUAL WELLNESS EXAM: Primary | ICD-10-CM

## 2025-04-21 DIAGNOSIS — G89.29 CHRONIC MIDLINE LOW BACK PAIN WITHOUT SCIATICA: ICD-10-CM

## 2025-04-21 DIAGNOSIS — C61 MALIGNANT NEOPLASM PROSTATE (H): ICD-10-CM

## 2025-04-21 DIAGNOSIS — R60.0 PERIPHERAL EDEMA: ICD-10-CM

## 2025-04-21 DIAGNOSIS — H91.93 BILATERAL HEARING LOSS, UNSPECIFIED HEARING LOSS TYPE: ICD-10-CM

## 2025-04-21 DIAGNOSIS — I48.0 PAF (PAROXYSMAL ATRIAL FIBRILLATION) (H): ICD-10-CM

## 2025-04-21 PROCEDURE — 72100 X-RAY EXAM L-S SPINE 2/3 VWS: CPT | Mod: TC | Performed by: RADIOLOGY

## 2025-04-21 PROCEDURE — 3074F SYST BP LT 130 MM HG: CPT | Performed by: FAMILY MEDICINE

## 2025-04-21 PROCEDURE — 3078F DIAST BP <80 MM HG: CPT | Performed by: FAMILY MEDICINE

## 2025-04-21 PROCEDURE — 99214 OFFICE O/P EST MOD 30 MIN: CPT | Mod: 25 | Performed by: FAMILY MEDICINE

## 2025-04-21 PROCEDURE — G2211 COMPLEX E/M VISIT ADD ON: HCPCS | Performed by: FAMILY MEDICINE

## 2025-04-21 PROCEDURE — G0439 PPPS, SUBSEQ VISIT: HCPCS | Performed by: FAMILY MEDICINE

## 2025-04-21 PROCEDURE — 72070 X-RAY EXAM THORAC SPINE 2VWS: CPT | Mod: TC | Performed by: RADIOLOGY

## 2025-04-21 PROCEDURE — 1125F AMNT PAIN NOTED PAIN PRSNT: CPT | Performed by: FAMILY MEDICINE

## 2025-04-21 RX ORDER — FUROSEMIDE 20 MG/1
TABLET ORAL
Qty: 90 TABLET | Refills: 1 | Status: SHIPPED | OUTPATIENT
Start: 2025-04-21

## 2025-04-21 RX ORDER — METOPROLOL SUCCINATE 100 MG/1
100 TABLET, EXTENDED RELEASE ORAL DAILY
Qty: 90 TABLET | Refills: 1 | Status: SHIPPED | OUTPATIENT
Start: 2025-04-21

## 2025-04-21 SDOH — HEALTH STABILITY: PHYSICAL HEALTH: ON AVERAGE, HOW MANY DAYS PER WEEK DO YOU ENGAGE IN MODERATE TO STRENUOUS EXERCISE (LIKE A BRISK WALK)?: 3 DAYS

## 2025-04-21 ASSESSMENT — SOCIAL DETERMINANTS OF HEALTH (SDOH): HOW OFTEN DO YOU GET TOGETHER WITH FRIENDS OR RELATIVES?: ONCE A WEEK

## 2025-04-21 ASSESSMENT — PAIN SCALES - GENERAL: PAINLEVEL_OUTOF10: MODERATE PAIN (4)

## 2025-04-21 NOTE — PATIENT INSTRUCTIONS
Patient Education   Preventive Care Advice   This is general advice given by our system to help you stay healthy. However, your care team may have specific advice just for you. Please talk to your care team about your preventive care needs.  Nutrition  Eat 5 or more servings of fruits and vegetables each day.  Try wheat bread, brown rice and whole grain pasta (instead of white bread, rice, and pasta).  Get enough calcium and vitamin D. Check the label on foods and aim for 100% of the RDA (recommended daily allowance).  Lifestyle  Exercise at least 150 minutes each week  (30 minutes a day, 5 days a week).  Do muscle strengthening activities 2 days a week. These help control your weight and prevent disease.  No smoking.  Wear sunscreen to prevent skin cancer.  Have a dental exam and cleaning every 6 months.  Yearly exams  See your health care team every year to talk about:  Any changes in your health.  Any medicines your care team has prescribed.  Preventive care, family planning, and ways to prevent chronic diseases.  Shots (vaccines)   HPV shots (up to age 26), if you've never had them before.  Hepatitis B shots (up to age 59), if you've never had them before.  COVID-19 shot: Get this shot when it's due.  Flu shot: Get a flu shot every year.  Tetanus shot: Get a tetanus shot every 10 years.  Pneumococcal, hepatitis A, and RSV shots: Ask your care team if you need these based on your risk.  Shingles shot (for age 50 and up)  General health tests  Diabetes screening:  Starting at age 35, Get screened for diabetes at least every 3 years.  If you are younger than age 35, ask your care team if you should be screened for diabetes.  Cholesterol test: At age 39, start having a cholesterol test every 5 years, or more often if advised.  Bone density scan (DEXA): At age 50, ask your care team if you should have this scan for osteoporosis (brittle bones).  Hepatitis C: Get tested at least once in your life.  STIs (sexually  transmitted infections)  Before age 24: Ask your care team if you should be screened for STIs.  After age 24: Get screened for STIs if you're at risk. You are at risk for STIs (including HIV) if:  You are sexually active with more than one person.  You don't use condoms every time.  You or a partner was diagnosed with a sexually transmitted infection.  If you are at risk for HIV, ask about PrEP medicine to prevent HIV.  Get tested for HIV at least once in your life, whether you are at risk for HIV or not.  Cancer screening tests  Cervical cancer screening: If you have a cervix, begin getting regular cervical cancer screening tests starting at age 21.  Breast cancer scan (mammogram): If you've ever had breasts, begin having regular mammograms starting at age 40. This is a scan to check for breast cancer.  Colon cancer screening: It is important to start screening for colon cancer at age 45.  Have a colonoscopy test every 10 years (or more often if you're at risk) Or, ask your provider about stool tests like a FIT test every year or Cologuard test every 3 years.  To learn more about your testing options, visit:   .  For help making a decision, visit:   https://bit.ly/vy29374.  Prostate cancer screening test: If you have a prostate, ask your care team if a prostate cancer screening test (PSA) at age 55 is right for you.  Lung cancer screening: If you are a current or former smoker ages 50 to 80, ask your care team if ongoing lung cancer screenings are right for you.  For informational purposes only. Not to replace the advice of your health care provider. Copyright   2023 [employer]. All rights reserved. Clinically reviewed by the [facility] Transitions Program. EpicTopic [id number] - REV 01/24.

## 2025-04-21 NOTE — PROGRESS NOTES
Preventive Care Visit  [facility]  Junie Golden MD, Family Medicine  Apr 21, 2025      Assessment & Plan     (Z00.00) Encounter for Medicare annual wellness exam  (primary encounter diagnosis)  Comment: preventive needs reviewed   Plan: see orders in Epic.     (R60.0) Peripheral edema  Comment: episodic  Plan: furosemide (LASIX) 20 MG tablet        Taking daily, continue to monitor for chf   Refill x 6 months     (I48.0) PAF (paroxysmal atrial fibrillation) (H)  Comment: stable,   Plan: metoprolol succinate ER (TOPROL XL) 100 MG 24         hr tablet, Anticoagulation Clinic Referral         Refill x 6 months     (M54.50,  G89.29) Chronic midline low back pain without sciatica  Comment: midline pain, new   Plan: XR Lumbar Spine 2/3 Views, XR Thoracic Spine 2         Views        Check xrays, pt with h/o prostate cancer, r/o lytic lesions    (H91.93) Bilateral hearing loss, unspecified hearing loss type  Comment: needs eval  Plan: PRIMARY CARE FOLLOW-UP SCHEDULING, Adult         Audiology  Referral            (C61) Malignant neoplasm prostate (H)  Comment: no symptoms of recurrence, no urinary symptoms  Plan: XR Lumbar Spine 2/3 Views, XR Thoracic Spine 2         Views        Need to r/o bone mets as source of back pain            Counseling  Appropriate preventive services were addressed with this patient via screening, questionnaire, or discussion as appropriate for fall prevention, nutrition, physical activity, Tobacco-use cessation, social engagement, weight loss and cognition.  Checklist reviewing preventive services available has been given to the patient.  Reviewed patient's diet, addressing concerns and/or questions.   He is at risk for lack of exercise and has been provided with information to increase physical activity for the benefit of his well-being.   Discussed possible causes of fatigue. Updated plan of care.  Patient reported difficulty with activities of daily living  were addressed today.The patient was provided with written information regarding signs of hearing loss.     The longitudinal plan of care for the diagnosis(es)/condition(s) as documented were addressed during this visit. Due to the added complexity in care, I will continue to support Rian in the subsequent management and with ongoing continuity of care.      Rose Modi is a 79 year old, presenting for the following:  Wellness Visit        4/21/2025     1:49 PM   Additional Questions   Roomed by Mat SALAS  Rian is here for his wellness exam.  He feels he is doing well home care is resolving so he needs a new referral to INR clinic.    His main complaint is that he has some back pain that is in the middle of his back near where the thoracic and lumbar transition.  He has no numbness or tingling in his legs no abdominal pain.  He does have a history of prostate cancer.  He has no history of trauma or fall recently.     In January of this year he had influenza and became very ill he fell at home and was found down.  This resulted in hospitalization for over a week then he went to rehab for a period of time before being discharged home with home care.  As mentioned Home care will be discontinuing soon.  He feels confident he no longer needs the assistance.        Advance Care Planning    Discussed advance care planning with patient; informed AVS has link to [employer].        4/21/2025   General Health   How would you rate your overall physical health? Good   Feel stress (tense, anxious, or unable to sleep) Only a little   (!) STRESS CONCERN      4/21/2025   Nutrition   Diet: I don't know         4/21/2025   Exercise   Days per week of moderate/strenous exercise 3 days         4/21/2025   Social Factors   Frequency of gathering with friends or relatives Once a week   Worry food won't last until get money to buy more No   Food not last or not have enough money for food? No   Do you have  housing? (Housing is defined as stable permanent housing and does not include staying ouside in a car, in a tent, in an abandoned building, in an overnight shelter, or couch-surfing.) Yes   Are you worried about losing your housing? No   Lack of transportation? No   Unable to get utilities (heat,electricity)? No         4/21/2025   Fall Risk   Fallen 2 or more times in the past year? No   Trouble with walking or balance? No          4/21/2025   Activities of Daily Living- Home Safety   Needs help with the following daily activites Housework    None of the above   Safety concerns in the home None of the above       Multiple values from one day are sorted in reverse-chronological order         4/21/2025   Dental   Dentist two times every year? Yes         4/21/2025   Hearing Screening   Hearing concerns? (!) I NEED TO ASK PEOPLE TO SPEAK UP OR REPEAT THEMSELVES.         4/21/2025   Driving Risk Screening   Patient/family members have concerns about driving No         4/21/2025   General Alertness/Fatigue Screening   Have you been more tired than usual lately? (!) YES         4/21/2025   Urinary Incontinence Screening   Bothered by leaking urine in past 6 months No         Today's PHQ-2 Score:       4/21/2025     1:55 PM   PHQ-2 ( 1999 [employer])   Q1: Little interest or pleasure in doing things 1   Q2: Feeling down, depressed or hopeless 1   PHQ-2 Score 2    Q1: Little interest or pleasure in doing things Several days   Q2: Feeling down, depressed or hopeless Several days   PHQ-2 Score 2       Patient-reported           4/21/2025   Substance Use   Alcohol more than 3/day or more than 7/wk No   Do you have a current opioid prescription? No   How severe/bad is pain from 1 to 10? 4/10   Do you use any other substances recreationally? No     Social History     Tobacco Use    Smoking status: Former     Current packs/day: 0.00     Average packs/day: 1.5 packs/day for 34.0 years (51.0 ttl pk-yrs)     Types: Cigarettes     Start  date: 1950     Quit date: 1984     Years since quittin.2    Smokeless tobacco: Never    Tobacco comments:     Nonsmoking household   Vaping Use    Vaping status: Never Used   Substance Use Topics    Alcohol use: Yes     Comment: very lightly    Drug use: No           Family history of prostate cancer: Yes pt  Last PSA:   PSA   Date Value Ref Range Status   2021 0.09 0 - 4 ug/L Final     Comment:     Assay Method:  Chemiluminescence using Siemens Vista analyzer     Prostate Specific Antigen Screen   Date Value Ref Range Status   2025 0.14 ng/mL Final     Comment:     No reference ranges have been established for patients over 80 years.     PSA Tumor Marker   Date Value Ref Range Status   2022 0.10 0.00 - 4.00 ug/L Final     Doing self testicular exam: NO     Family history of colon cancer:No  Last colonscopy: , no further screening     Family history of CAD:No  Last Cholesterol:   Lab Results   Component Value Date    CHOL 150 2025    CHOL 165 11/15/2017     Lab Results   Component Value Date    HDL 45 2025    HDL 46 11/15/2017     Lab Results   Component Value Date    LDL 85 2025    LDL 93 11/15/2017     Lab Results   Component Value Date    TRIG 99 2025    TRIG 130 11/15/2017     Lab Results   Component Value Date    CHOLHDLRATIO 4.9 2012          Immunizations:    Td: tdap 2023  Covid: 2022  Flu: 2025  Shingrix: declines  Pneumovax: done  RSV: recommended    Seat Belt:YES   Sunscreen use: YES  Calcium Intake: adeq  Health Care Directive:NO   Sexually Active:NO      Current contraception: none       Patient Ed:  Reviewed health maintenance including diet, regular exercise, and periodic exams.     The risks, benefits, and treatment options of prescribed medications or other treatments have been discussed with the patient.  The patient should call or schedule a follow up appt if no improvement or other problems.           Reviewed and updated as  needed this visit by Provider   Tobacco  Allergies  Meds  Problems  Med Hx  Surg Hx  Fam Hx            Labs reviewed in EPIC  BP Readings from Last 3 Encounters:   25 119/78   25 136/85   24 121/75    Wt Readings from Last 3 Encounters:   25 90.1 kg (198 lb 9.6 oz)   25 92.1 kg (203 lb)   24 93 kg (205 lb)                  Patient Active Problem List   Diagnosis    Testicular hypofunction    Osteoarthritis, knee    Malignant neoplasm prostate (H)    CARDIOVASCULAR SCREENING; LDL GOAL LESS THAN 130    Status post total knee replacement    AC (acromioclavicular) joint arthritis    Biceps tendonitis    Pseudophakia,ou; Yag Caps, os    Hypertension goal BP (blood pressure) < 140/90    Gastroesophageal reflux disease without esophagitis    Posterior vitreous detachment, bilateral    S/P complete repair of rotator cuff    Paroxysmal atrial fibrillation (H)    Persistent atrial fibrillation (H)    PAF (paroxysmal atrial fibrillation) (H)     Past Surgical History:   Procedure Laterality Date    ABDOMEN SURGERY      ARTHROSCOPY KNEE RT/LT      right    ARTHROSCOPY KNEE RT/LT      left    CATARACT IOL, RT/LT      COLONOSCOPY      LASER YAG CAPSULOTOMY Left 2023    PHACOEMULSIFICATION WITH STANDARD INTRAOCULAR LENS IMPLANT  2010; 2010    left eye; right eye    SUPRAPUBIC PROSTATECTOMY  2011    VASCULAR SURGERY      ZZC TOTAL KNEE ARTHROPLASTY  10/2011    right knee       Social History     Tobacco Use    Smoking status: Former     Current packs/day: 0.00     Average packs/day: 1.5 packs/day for 34.0 years (51.0 ttl pk-yrs)     Types: Cigarettes     Start date: 1950     Quit date: 1984     Years since quittin.2    Smokeless tobacco: Never    Tobacco comments:     Nonsmoking household   Substance Use Topics    Alcohol use: Yes     Comment: very lightly     Family History   Problem Relation Age of Onset    Cancer Mother         leukemia    Depression  Other     Depression Other     Prostate Cancer Brother     Prostate Cancer Brother     Prostate Cancer Brother          Current Outpatient Medications   Medication Sig Dispense Refill    DAILY MULTIVITAMIN PO 1 tab daily      furosemide (LASIX) 20 MG tablet 1 tab in AM as needed for leg swelling. Eat potatoes or extra fruits/veggies for potassium 90 tablet 1    loratadine (CLARITIN) 10 MG tablet TAKE 1 TABLET(10 MG) BY MOUTH DAILY 90 tablet 0    MAGNESIUM OR 1 tablet daily      metoprolol succinate ER (TOPROL XL) 100 MG 24 hr tablet Take 1 tablet (100 mg) by mouth daily. 90 tablet 1    omeprazole (PRILOSEC) 20 MG DR capsule TAKE 1 CAPSULE TWICE DAILY 180 capsule 2    warfarin ANTICOAGULANT (COUMADIN) 5 MG tablet Take 7.5 mg (5 mg x 1.5 tabs) by mouth every Fri; and take 5 mg (5 mg x 1 tab) all other days of the week or as directed by your ACC Team based on INR results 100 tablet 1     Current providers sharing in care for this patient include:  Patient Care Team:  Junie Golden MD as PCP - General (Family Practice)  Junie Golden MD as Assigned PCP  Yash Stevens MD as MD (Cardiology)  Jared Abebe MD as Assigned Surgical Provider    The following health maintenance items are reviewed in Epic and correct as of today:  Health Maintenance   Topic Date Due    ANNUAL REVIEW OF  ORDERS  Never done    ZOSTER IMMUNIZATION (1 of 2) Never done    RSV VACCINE (1 - 1-dose 75+ series) Never done    COVID-19 Vaccine (5 - 2024-25 season) 09/01/2024    EYE EXAM  02/28/2025    PSA  01/23/2026    BMP  03/04/2026    MEDICARE ANNUAL WELLNESS VISIT  04/21/2026    FALL RISK ASSESSMENT  04/21/2026    ADVANCE CARE PLANNING  06/17/2029    DTAP/TDAP/TD IMMUNIZATION (3 - Td or Tdap) 12/13/2033    PHQ-2 (once per calendar year)  Completed    INFLUENZA VACCINE  Completed    Pneumococcal Vaccine: 50+ Years  Completed    HPV IMMUNIZATION  Aged Out    MENINGITIS IMMUNIZATION  Aged Out    COLORECTAL CANCER SCREENING   "Discontinued         Review of Systems  Constitutional, neuro, ENT, endocrine, pulmonary, cardiac, gastrointestinal, genitourinary, musculoskeletal, integument and psychiatric systems are negative, except as otherwise noted.     Objective    Exam  /78   Pulse 75   Temp 97.4  F (36.3  C) (Oral)   Resp 20   Ht 1.689 m (5' 6.5\")   Wt 90.1 kg (198 lb 9.6 oz)   SpO2 96%   BMI 31.57 kg/m     Estimated body mass index is 31.57 kg/m  as calculated from the following:    Height as of this encounter: 1.689 m (5' 6.5\").    Weight as of this encounter: 90.1 kg (198 lb 9.6 oz).    Physical Exam  GENERAL: alert and no distress  EYES: Eyes grossly normal to inspection, PERRL and conjunctivae and sclerae normal  HENT: ear canals and TM's normal, nose and mouth without ulcers or lesions  NECK: no adenopathy, no asymmetry, masses, or scars  RESP: lungs clear to auscultation - no rales, rhonchi or wheezes  CV: regular rate and rhythm, normal S1 S2, no S3 or S4, no murmur, click or rub, no peripheral edema  ABDOMEN: soft, nontender, no hepatosplenomegaly, no masses and bowel sounds normal  MS: no gross musculoskeletal defects noted, no edema  SKIN: no suspicious lesions or rashes  NEURO: Normal strength and tone, mentation intact and speech normal  PSYCH: mentation appears normal, affect normal/bright        4/21/2025   Mini Cog   Mini-Cog Not Completed (choose reason) Patient declines   Normal cognition based on my direct observation during interview and exam.     Patient declines, there are NO concerns for cognitive deficits.           Signed Electronically by: Junie Golden MD    "

## 2025-04-24 ENCOUNTER — OFFICE VISIT (OUTPATIENT)
Dept: OPHTHALMOLOGY | Facility: CLINIC | Age: 80
End: 2025-04-24
Payer: MEDICARE

## 2025-04-24 DIAGNOSIS — Z96.1 PSEUDOPHAKIA: Primary | ICD-10-CM

## 2025-04-24 DIAGNOSIS — H52.4 PRESBYOPIA: ICD-10-CM

## 2025-04-24 DIAGNOSIS — H43.813 POSTERIOR VITREOUS DETACHMENT, BILATERAL: ICD-10-CM

## 2025-04-24 DIAGNOSIS — Z01.01 ENCOUNTER FOR EXAMINATION OF EYES AND VISION WITH ABNORMAL FINDINGS: ICD-10-CM

## 2025-04-24 RX ORDER — CARBOXYMETHYLCELLULOSE SODIUM 5 MG/ML
1 SOLUTION/ DROPS OPHTHALMIC DAILY PRN
COMMUNITY

## 2025-04-24 ASSESSMENT — CONF VISUAL FIELD
OS_INFERIOR_TEMPORAL_RESTRICTION: 0
OS_INFERIOR_NASAL_RESTRICTION: 0
OD_INFERIOR_TEMPORAL_RESTRICTION: 3
OS_NORMAL: 1
OS_SUPERIOR_TEMPORAL_RESTRICTION: 0
OS_SUPERIOR_NASAL_RESTRICTION: 0

## 2025-04-24 ASSESSMENT — REFRACTION_MANIFEST
OD_SPHERE: -1.25
OD_CYLINDER: +2.25
OD_ADD: +3.00
OS_SPHERE: -1.50
OD_AXIS: 178
OS_ADD: +3.00
OS_CYLINDER: +2.00
OS_AXIS: 175

## 2025-04-24 ASSESSMENT — REFRACTION_WEARINGRX
OD_SPHERE: -1.00
OD_ADD: +2.75
OS_ADD: +2.75
OS_CYLINDER: +2.00
OS_SPHERE: -1.25
OS_AXIS: 179
OD_AXIS: 180
SPECS_TYPE: BIFOCAL-LINED
OD_CYLINDER: +0.75

## 2025-04-24 ASSESSMENT — VISUAL ACUITY
OD_CC: 20/30
OS_CC: 20/20
METHOD: SNELLEN - LINEAR
OD_CC+: -1
OS_CC+: -2
CORRECTION_TYPE: GLASSES

## 2025-04-24 ASSESSMENT — CUP TO DISC RATIO
OS_RATIO: 0.4
OD_RATIO: 0.2

## 2025-04-24 ASSESSMENT — EXTERNAL EXAM - LEFT EYE: OS_EXAM: 2+ BROW PTOSIS, PROLAPSED FAT PADS: UPPER, LOWER

## 2025-04-24 ASSESSMENT — TONOMETRY
IOP_METHOD: APPLANATION
OD_IOP_MMHG: 15
OS_IOP_MMHG: 15

## 2025-04-24 ASSESSMENT — SLIT LAMP EXAM - LIDS
COMMENTS: 2+ DERMATOCHALASIS - UPPER LID
COMMENTS: 2+ DERMATOCHALASIS - UPPER LID

## 2025-04-24 ASSESSMENT — EXTERNAL EXAM - RIGHT EYE: OD_EXAM: 2+ BROW PTOSIS, PROLAPSED FAT PADS: UPPER, LOWER

## 2025-04-24 NOTE — PROGRESS NOTES
" Current Eye Medications:  Refresh prn both eyes     Subjective:  here for complete eye exam. Just got out of therapy after having severe flu in the hospital.  Has noticed some blurred vision in the right eye recently. Has had a pain in the right back area for three days, went to the doctor and they didn't find anything. Has a hard time getting up to the slitlamp today with the pain.      Objective:  See Ophthalmology Exam.       Assessment:  Stable eye exam.      ICD-10-CM    1. Pseudophakia,ou; Yag Caps, os  Z96.1       2. Posterior vitreous detachment, bilateral  H43.813       3. Encounter for examination of eyes and vision with abnormal findings  Z01.01       4. Presbyopia  H52.4            Plan:  Glasses prescription given - optional    May use artificial tears up to four times a day (like Refresh Optive, Systane Balance, TheraTears, or generic artificial tears are ok. Avoid \"get the red out\" drops).     Call in December 2025 for an appointment in April 2026 for Complete Exam    Dr. Abebe [phone]        "

## 2025-04-24 NOTE — LETTER
"4/24/2025      Rian Ness  [address] [address] [facility] [address]      Dear Colleague,    Thank you for referring your patient, Rian Ness, to the [facility]. Please see a copy of my visit note below.     Current Eye Medications:  Refresh prn both eyes     Subjective:  here for complete eye exam. Just got out of therapy after having severe flu in the hospital.  Has noticed some blurred vision in the right eye recently. Has had a pain in the right back area for three days, went to the doctor and they didn't find anything. Has a hard time getting up to the slitlamp today with the pain.      Objective:  See Ophthalmology Exam.       Assessment:  Stable eye exam.      ICD-10-CM    1. Pseudophakia,ou; Yag Caps, os  Z96.1       2. Posterior vitreous detachment, bilateral  H43.813       3. Encounter for examination of eyes and vision with abnormal findings  Z01.01       4. Presbyopia  H52.4            Plan:  Glasses prescription given - optional    May use artificial tears up to four times a day (like Refresh Optive, Systane Balance, TheraTears, or generic artificial tears are ok. Avoid \"get the red out\" drops).     Call in December 2025 for an appointment in April 2026 for Complete Exam    Dr. Abebe [phone]            Again, thank you for allowing me to participate in the care of your patient.        Sincerely,        Jared Abebe MD    Electronically signed"

## 2025-04-24 NOTE — PATIENT INSTRUCTIONS
"Glasses prescription given - optional    May use artificial tears up to four times a day (like Refresh Optive, Systane Balance, TheraTears, or generic artificial tears are ok. Avoid \"get the red out\" drops).     Call in December 2025 for an appointment in April 2026  for Complete Exam    Dr. Abebe [phone]    "

## 2025-05-01 ENCOUNTER — MEDICAL CORRESPONDENCE (OUTPATIENT)
Dept: HEALTH INFORMATION MANAGEMENT | Facility: CLINIC | Age: 80
End: 2025-05-01
Payer: MEDICARE

## 2025-05-07 ENCOUNTER — ANTICOAGULATION THERAPY VISIT (OUTPATIENT)
Dept: ANTICOAGULATION | Facility: CLINIC | Age: 80
End: 2025-05-07

## 2025-05-07 ENCOUNTER — RESULTS FOLLOW-UP (OUTPATIENT)
Dept: ANTICOAGULATION | Facility: CLINIC | Age: 80
End: 2025-05-07

## 2025-05-07 ENCOUNTER — LAB (OUTPATIENT)
Dept: LAB | Facility: CLINIC | Age: 80
End: 2025-05-07
Payer: MEDICARE

## 2025-05-07 DIAGNOSIS — I48.0 PAF (PAROXYSMAL ATRIAL FIBRILLATION) (H): ICD-10-CM

## 2025-05-07 DIAGNOSIS — I48.19 PERSISTENT ATRIAL FIBRILLATION (H): ICD-10-CM

## 2025-05-07 DIAGNOSIS — I48.0 PAROXYSMAL ATRIAL FIBRILLATION (H): Primary | ICD-10-CM

## 2025-05-07 LAB — INR BLD: 2.5 (ref 0.9–1.1)

## 2025-05-07 PROCEDURE — 85610 PROTHROMBIN TIME: CPT

## 2025-05-07 PROCEDURE — 36416 COLLJ CAPILLARY BLOOD SPEC: CPT

## 2025-05-07 NOTE — PROGRESS NOTES
ANTICOAGULATION MANAGEMENT     Rian Ness 79 year old male is on warfarin with therapeutic INR result. (Goal INR 2.0-3.0)    Recent labs: (last 7 days)     05/07/25  1105   INR 2.5*       ASSESSMENT     Source(s): Chart Review and Patient/Caregiver Call     Warfarin doses taken: Warfarin taken as instructed  Diet: No new diet changes identified  Medication/supplement changes: None noted  New illness, injury, or hospitalization: No  Signs or symptoms of bleeding or clotting: No  Previous result: Therapeutic last 2(+) visits  Additional findings: None       PLAN     Recommended plan for no diet, medication or health factor changes and previous 2 INR results within goal affecting INR     Dosing Instructions: Continue your current warfarin dose with next INR in 4 weeks       Summary  As of 5/7/2025      Full warfarin instructions:  7.5 mg every Mon, Wed, Fri; 5 mg all other days   Next INR check:  6/4/2025               Telephone call with Rian who verbalizes understanding and agrees to plan    Lab visit scheduled    Education provided: Contact [phone] with any changes, questions or concerns.     Plan made per [facility] anticoagulation protocol    Luda Grissom RN  5/7/2025  Anticoagulation Clinic  "[employer] for routing messages: silvana MEJIAS  [facility] patient phone line: [phone]        _______________________________________________________________________     Anticoagulation Episode Summary       Current INR goal:  2.0-3.0   TTR:  84.1% (1 y)   Target end date:  Indefinite   Send INR reminders to:  LAITA MEJIAS    Indications    Paroxysmal atrial fibrillation (H) [I48.0]  Persistent atrial fibrillation (H) [I48.19]             Comments:  --             Anticoagulation Care Providers       Provider Role Specialty Phone number    Junie Golden MD Referring Family Medicine [phone]

## 2025-05-21 DIAGNOSIS — J30.2 SEASONAL ALLERGIC RHINITIS, UNSPECIFIED TRIGGER: ICD-10-CM

## 2025-05-22 RX ORDER — LORATADINE 10 MG/1
1 TABLET ORAL DAILY
Qty: 90 TABLET | Refills: 3 | Status: SHIPPED | OUTPATIENT
Start: 2025-05-22

## 2025-06-04 ENCOUNTER — RESULTS FOLLOW-UP (OUTPATIENT)
Dept: ANTICOAGULATION | Facility: CLINIC | Age: 80
End: 2025-06-04

## 2025-06-04 ENCOUNTER — ANTICOAGULATION THERAPY VISIT (OUTPATIENT)
Dept: ANTICOAGULATION | Facility: CLINIC | Age: 80
End: 2025-06-04

## 2025-06-04 ENCOUNTER — LAB (OUTPATIENT)
Dept: LAB | Facility: CLINIC | Age: 80
End: 2025-06-04
Payer: MEDICARE

## 2025-06-04 DIAGNOSIS — I48.19 PERSISTENT ATRIAL FIBRILLATION (H): ICD-10-CM

## 2025-06-04 DIAGNOSIS — I48.0 PAROXYSMAL ATRIAL FIBRILLATION (H): Primary | ICD-10-CM

## 2025-06-04 LAB — INR BLD: 1.6 (ref 0.9–1.1)

## 2025-06-04 PROCEDURE — 36416 COLLJ CAPILLARY BLOOD SPEC: CPT

## 2025-06-04 PROCEDURE — 85610 PROTHROMBIN TIME: CPT

## 2025-06-04 NOTE — PROGRESS NOTES
ANTICOAGULATION MANAGEMENT     Rian Ness 79 year old male is on warfarin with subtherapeutic INR result. (Goal INR 2.0-3.0)    Recent labs: (last 7 days)     06/04/25  0943   INR 1.6*       ASSESSMENT     Source(s): Chart Review and Patient/Caregiver Call     Warfarin doses taken: Warfarin taken as instructed  Diet: No new diet changes identified  Medication/supplement changes: None noted  New illness, injury, or hospitalization: Yes: cold symptoms states that he is starting to improve   Signs or symptoms of bleeding or clotting: No  Previous result: Therapeutic last 2(+) visits  Additional findings: None       PLAN     Recommended plan for temporary change(s) affecting INR     Dosing Instructions: booster dose then continue your current warfarin dose with next INR in 2 weeks       Summary  As of 6/4/2025      Full warfarin instructions:  6/4: 10 mg; Otherwise 7.5 mg every Mon, Wed, Fri; 5 mg all other days   Next INR check:  6/18/2025               Telephone call with Rian who agrees to plan and repeated back plan correctly    Lab visit scheduled    Education provided: Goal range and lab monitoring: goal range and significance of current result  Contact [phone] with any changes, questions or concerns.     Plan made per [facility] anticoagulation protocol    Luda Grissom RN  6/4/2025  Anticoagulation Clinic  [email or web address] for routing messages: silvana MEJIAS  [facility] patient phone line: [phone]        _______________________________________________________________________     Anticoagulation Episode Summary       Current INR goal:  2.0-3.0   TTR:  80.7% (1 y)   Target end date:  Indefinite   Send INR reminders to:  LATIA MEJIAS    Indications    Paroxysmal atrial fibrillation (H) [I48.0]  Persistent atrial fibrillation (H) [I48.19]             Comments:  --             Anticoagulation Care Providers       Provider Role Specialty Phone number    Junie Golden MD Referring Family Medicine  [phone]

## 2025-06-18 ENCOUNTER — ANTICOAGULATION THERAPY VISIT (OUTPATIENT)
Dept: ANTICOAGULATION | Facility: CLINIC | Age: 80
End: 2025-06-18

## 2025-06-18 ENCOUNTER — LAB (OUTPATIENT)
Dept: LAB | Facility: CLINIC | Age: 80
End: 2025-06-18
Payer: MEDICARE

## 2025-06-18 ENCOUNTER — RESULTS FOLLOW-UP (OUTPATIENT)
Dept: ANTICOAGULATION | Facility: CLINIC | Age: 80
End: 2025-06-18

## 2025-06-18 DIAGNOSIS — I48.19 PERSISTENT ATRIAL FIBRILLATION (H): ICD-10-CM

## 2025-06-18 DIAGNOSIS — I48.0 PAROXYSMAL ATRIAL FIBRILLATION (H): Primary | ICD-10-CM

## 2025-06-18 LAB — INR BLD: 3.7 (ref 0.9–1.1)

## 2025-06-18 PROCEDURE — 85610 PROTHROMBIN TIME: CPT

## 2025-06-18 PROCEDURE — 36415 COLL VENOUS BLD VENIPUNCTURE: CPT

## 2025-06-18 NOTE — PROGRESS NOTES
ANTICOAGULATION MANAGEMENT     Rian Ness 79 year old male is on warfarin with supratherapeutic INR result. (Goal INR 2.0-3.0)    Recent labs: (last 7 days)     06/18/25  1124   INR 3.7*       ASSESSMENT     Source(s): Chart Review and Patient/Caregiver Call     Warfarin doses taken: Warfarin taken as instructed  Diet: No new diet changes identified  Medication/supplement changes: None noted  New illness, injury, or hospitalization: Yes: continues with a cough and congestion.  He is planning to follow up with his provider   Signs or symptoms of bleeding or clotting: No  Previous result: Subtherapeutic  Additional findings: advised to let the [facility] know if he has any medication changes after he is seen for his symptoms       PLAN     Recommended plan for temporary change(s) affecting INR     Dosing Instructions: partial hold then continue your current warfarin dose with next INR in 2 weeks       Summary  As of 6/18/2025      Full warfarin instructions:  6/18: 5 mg; Otherwise 7.5 mg every Mon, Wed, Fri; 5 mg all other days   Next INR check:  7/2/2025               Telephone call with Rian who verbalizes understanding and agrees to plan    Lab visit scheduled    Education provided: Goal range and lab monitoring: goal range and significance of current result  Symptom monitoring: when to seek medical attention/emergency care  Contact [phone] with any changes, questions or concerns.     Plan made per [facility] anticoagulation protocol    Luda Grissom RN  6/18/2025  Anticoagulation Clinic  [employer] for routing messages: silvana MEJIAS  [facility] patient phone line: [phone]        _______________________________________________________________________     Anticoagulation Episode Summary       Current INR goal:  2.0-3.0   TTR:  78.7% (1 y)   Target end date:  Indefinite   Send INR reminders to:  LATIA MEJIAS    Indications    Paroxysmal atrial fibrillation (H) [I48.0]  Persistent atrial fibrillation (H)  [I48.19]             Comments:  --             Anticoagulation Care Providers       Provider Role Specialty Phone number    Juine Golden MD Referring Family Medicine [phone]

## 2025-07-01 DIAGNOSIS — I48.0 PAF (PAROXYSMAL ATRIAL FIBRILLATION) (H): ICD-10-CM

## 2025-07-01 RX ORDER — WARFARIN SODIUM 5 MG/1
TABLET ORAL
Qty: 102 TABLET | Refills: 1 | Status: SHIPPED | OUTPATIENT
Start: 2025-07-01

## 2025-07-01 NOTE — TELEPHONE ENCOUNTER
ANTICOAGULATION MANAGEMENT:  Medication Refill    Anticoagulation Summary  As of 6/18/2025      Warfarin maintenance plan:  7.5 mg (5 mg x 1.5) every Mon, Wed, Fri; 5 mg (5 mg x 1) all other days   Next INR check:  7/2/2025   Target end date:  Indefinite    Indications    Paroxysmal atrial fibrillation (H) [I48.0]  Persistent atrial fibrillation (H) [I48.19]                 Anticoagulation Care Providers       Provider Role Specialty Phone number    Junie Golden MD Referring Family Medicine [phone]            Refill Criteria    Visit with referring provider/group: Meets criteria: visit within referring provider group in the last 15 months on 4/21/25    [facility] referral last signed: 04/21/2025; within last year:  Yes    Lab monitoring is up to date (not exceeding 2 weeks overdue): Yes    Rian meets all criteria for refill. Rx instructions and quantity supplied updated to match patient's current dosing plan. Warfarin 90 day supply with 1 refill granted per ACC protocol     Derrick ZELAYA RN  Anticoagulation Clinic

## 2025-07-02 ENCOUNTER — ANTICOAGULATION THERAPY VISIT (OUTPATIENT)
Dept: ANTICOAGULATION | Facility: CLINIC | Age: 80
End: 2025-07-02

## 2025-07-02 ENCOUNTER — LAB (OUTPATIENT)
Dept: LAB | Facility: CLINIC | Age: 80
End: 2025-07-02
Payer: MEDICARE

## 2025-07-02 ENCOUNTER — RESULTS FOLLOW-UP (OUTPATIENT)
Dept: ANTICOAGULATION | Facility: CLINIC | Age: 80
End: 2025-07-02

## 2025-07-02 DIAGNOSIS — I48.19 PERSISTENT ATRIAL FIBRILLATION (H): ICD-10-CM

## 2025-07-02 DIAGNOSIS — I48.0 PAROXYSMAL ATRIAL FIBRILLATION (H): Primary | ICD-10-CM

## 2025-07-02 LAB — INR BLD: 2.9 (ref 0.9–1.1)

## 2025-07-02 PROCEDURE — 36416 COLLJ CAPILLARY BLOOD SPEC: CPT

## 2025-07-02 PROCEDURE — 85610 PROTHROMBIN TIME: CPT

## 2025-07-02 NOTE — PROGRESS NOTES
ANTICOAGULATION MANAGEMENT     Rian Ness 79 year old male is on warfarin with therapeutic INR result. (Goal INR 2.0-3.0)    Recent labs: (last 7 days)     07/02/25  1124   INR 2.9*       ASSESSMENT     Source(s): Chart Review and Patient/Caregiver Call     Warfarin doses taken: Warfarin taken as instructed  Diet: No new diet changes identified  Medication/supplement changes: None noted  New illness, injury, or hospitalization: No  Signs or symptoms of bleeding or clotting: No  Previous result: Supratherapeutic  Additional findings: None       PLAN     Recommended plan for no diet, medication or health factor changes affecting INR     Dosing Instructions: Continue your current warfarin dose with next INR in 3 weeks       Summary  As of 7/2/2025      Full warfarin instructions:  7.5 mg every Mon, Wed, Fri; 5 mg all other days   Next INR check:  7/23/2025               Telephone call with Rian who verbalizes understanding and agrees to plan    Lab visit scheduled    Education provided: Contact [phone] with any changes, questions or concerns.     Plan made per [facility] anticoagulation protocol    Luda Grissom RN  7/2/2025  Anticoagulation Clinic  [employer] for routing messages: silvana MEJIAS  [facility] patient phone line: [phone]        _______________________________________________________________________     Anticoagulation Episode Summary       Current INR goal:  2.0-3.0   TTR:  75.3% (1 y)   Target end date:  Indefinite   Send INR reminders to:  LATIA MEJIAS    Indications    Paroxysmal atrial fibrillation (H) [I48.0]  Persistent atrial fibrillation (H) [I48.19]             Comments:  --             Anticoagulation Care Providers       Provider Role Specialty Phone number    Junie Golden MD Referring Family Medicine [phone]

## 2025-07-23 ENCOUNTER — LAB (OUTPATIENT)
Dept: LAB | Facility: CLINIC | Age: 80
End: 2025-07-23
Payer: MEDICARE

## 2025-07-23 ENCOUNTER — ANTICOAGULATION THERAPY VISIT (OUTPATIENT)
Dept: ANTICOAGULATION | Facility: CLINIC | Age: 80
End: 2025-07-23

## 2025-07-23 DIAGNOSIS — I48.0 PAROXYSMAL ATRIAL FIBRILLATION (H): Primary | ICD-10-CM

## 2025-07-23 DIAGNOSIS — I48.19 PERSISTENT ATRIAL FIBRILLATION (H): ICD-10-CM

## 2025-07-23 LAB — INR BLD: 2.8 (ref 0.9–1.1)

## 2025-07-23 PROCEDURE — 36416 COLLJ CAPILLARY BLOOD SPEC: CPT

## 2025-07-23 PROCEDURE — 85610 PROTHROMBIN TIME: CPT

## 2025-07-23 NOTE — PROGRESS NOTES
ANTICOAGULATION MANAGEMENT     Rian Ness 79 year old male is on warfarin with therapeutic INR result. (Goal INR 2.0-3.0)    Recent labs: (last 7 days)     07/23/25  1107   INR 2.8*       ASSESSMENT     Source(s): Chart Review and Patient/Caregiver Call     Warfarin doses taken: Warfarin taken as instructed  Diet: No new diet changes identified  Medication/supplement changes: None noted  New illness, injury, or hospitalization: No  Signs or symptoms of bleeding or clotting: No  Previous result: Therapeutic last 2(+) visits  Additional findings: None       PLAN     Recommended plan for no diet, medication or health factor changes affecting INR     Dosing Instructions: Continue your current warfarin dose with next INR in 4 weeks       Summary  As of 7/23/2025      Full warfarin instructions:  7.5 mg every Mon, Wed, Fri; 5 mg all other days   Next INR check:  8/20/2025               Telephone call with Rian who verbalizes understanding and agrees to plan    Lab visit scheduled    Education provided: Please call back if any changes to your diet, medications or how you've been taking warfarin    Plan made per [facility] anticoagulation protocol    Renee Blue RN  7/23/2025  Anticoagulation Clinic  [employer] for routing messages: silvana MEJIAS  [facility] patient phone line: [phone]        _______________________________________________________________________     Anticoagulation Episode Summary       Current INR goal:  2.0-3.0   TTR:  75.3% (1 y)   Target end date:  Indefinite   Send INR reminders to:  LATIA MEJIAS    Indications    Paroxysmal atrial fibrillation (H) [I48.0]  Persistent atrial fibrillation (H) [I48.19]             Comments:  --             Anticoagulation Care Providers       Provider Role Specialty Phone number    Junie Golden MD Referring Family Medicine [phone]

## 2025-08-20 ENCOUNTER — ANTICOAGULATION THERAPY VISIT (OUTPATIENT)
Dept: ANTICOAGULATION | Facility: CLINIC | Age: 80
End: 2025-08-20

## 2025-08-20 ENCOUNTER — LAB (OUTPATIENT)
Dept: LAB | Facility: CLINIC | Age: 80
End: 2025-08-20
Payer: MEDICARE

## 2025-08-20 ENCOUNTER — RESULTS FOLLOW-UP (OUTPATIENT)
Dept: ANTICOAGULATION | Facility: CLINIC | Age: 80
End: 2025-08-20

## 2025-08-20 DIAGNOSIS — I48.0 PAROXYSMAL ATRIAL FIBRILLATION (H): Primary | ICD-10-CM

## 2025-08-20 DIAGNOSIS — I48.19 PERSISTENT ATRIAL FIBRILLATION (H): ICD-10-CM

## 2025-08-20 LAB — INR BLD: 2.6 (ref 0.9–1.1)

## 2025-08-20 PROCEDURE — 85610 PROTHROMBIN TIME: CPT

## 2025-08-20 PROCEDURE — 36416 COLLJ CAPILLARY BLOOD SPEC: CPT
